# Patient Record
Sex: FEMALE | Race: WHITE | NOT HISPANIC OR LATINO
[De-identification: names, ages, dates, MRNs, and addresses within clinical notes are randomized per-mention and may not be internally consistent; named-entity substitution may affect disease eponyms.]

---

## 2017-11-13 ENCOUNTER — LABORATORY RESULT (OUTPATIENT)
Age: 67
End: 2017-11-13

## 2017-11-13 ENCOUNTER — APPOINTMENT (OUTPATIENT)
Dept: NEPHROLOGY | Facility: CLINIC | Age: 67
End: 2017-11-13
Payer: MEDICARE

## 2017-11-13 VITALS — SYSTOLIC BLOOD PRESSURE: 140 MMHG | DIASTOLIC BLOOD PRESSURE: 88 MMHG

## 2017-11-13 VITALS — WEIGHT: 179 LBS | OXYGEN SATURATION: 98 % | BODY MASS INDEX: 32.94 KG/M2 | HEART RATE: 81 BPM

## 2017-11-13 VITALS — DIASTOLIC BLOOD PRESSURE: 84 MMHG | SYSTOLIC BLOOD PRESSURE: 146 MMHG

## 2017-11-13 VITALS — HEART RATE: 72 BPM | DIASTOLIC BLOOD PRESSURE: 80 MMHG | SYSTOLIC BLOOD PRESSURE: 140 MMHG

## 2017-11-13 DIAGNOSIS — Z23 ENCOUNTER FOR IMMUNIZATION: ICD-10-CM

## 2017-11-13 PROCEDURE — 90662 IIV NO PRSV INCREASED AG IM: CPT

## 2017-11-13 PROCEDURE — 90732 PPSV23 VACC 2 YRS+ SUBQ/IM: CPT

## 2017-11-13 PROCEDURE — G0008: CPT

## 2017-11-13 PROCEDURE — 93000 ELECTROCARDIOGRAM COMPLETE: CPT

## 2017-11-13 PROCEDURE — 99214 OFFICE O/P EST MOD 30 MIN: CPT | Mod: 25

## 2017-11-13 PROCEDURE — 36415 COLL VENOUS BLD VENIPUNCTURE: CPT

## 2017-11-13 PROCEDURE — G0009: CPT

## 2017-11-15 ENCOUNTER — RECORD ABSTRACTING (OUTPATIENT)
Age: 67
End: 2017-11-15

## 2017-11-16 LAB
24R-OH-CALCIDIOL SERPL-MCNC: 35.1 PG/ML
25(OH)D3 SERPL-MCNC: 28.2 NG/ML
ALBUMIN MFR SERPL ELPH: 63.1 %
ALBUMIN SERPL ELPH-MCNC: 4.5 G/DL
ALBUMIN SERPL-MCNC: 4.5 G/DL
ALBUMIN/GLOB SERPL: 1.7 RATIO
ALDOSTERONE SERUM: 10 NG/DL
ALP BLD-CCNC: 81 U/L
ALP BONE SERPL-MCNC: 12 MCG/L
ALPHA1 GLOB MFR SERPL ELPH: 3.9 %
ALPHA1 GLOB SERPL ELPH-MCNC: 0.3 G/DL
ALPHA2 GLOB MFR SERPL ELPH: 9.3 %
ALPHA2 GLOB SERPL ELPH-MCNC: 0.7 G/DL
ALT SERPL-CCNC: 23 U/L
ANA SER IF-ACNC: NEGATIVE
ANION GAP SERPL CALC-SCNC: 16 MMOL/L
APPEARANCE: CLEAR
AST SERPL-CCNC: 22 U/L
B-GLOBULIN MFR SERPL ELPH: 11.3 %
B-GLOBULIN SERPL ELPH-MCNC: 0.8 G/DL
BACTERIA: ABNORMAL
BASOPHILS # BLD AUTO: 0.02 K/UL
BASOPHILS NFR BLD AUTO: 0.2 %
BILIRUB SERPL-MCNC: 0.3 MG/DL
BILIRUBIN URINE: NEGATIVE
BLOOD URINE: NEGATIVE
BUN SERPL-MCNC: 19 MG/DL
C3 SERPL-MCNC: 127 MG/DL
C4 SERPL-MCNC: 40 MG/DL
CALCIUM SERPL-MCNC: 9.9 MG/DL
CALCIUM SERPL-MCNC: 9.9 MG/DL
CHLORIDE SERPL-SCNC: 103 MMOL/L
CHOLEST SERPL-MCNC: 266 MG/DL
CHOLEST/HDLC SERPL: 5.8 RATIO
CO2 SERPL-SCNC: 23 MMOL/L
COLLAGEN CTX SERPL-MCNC: 165 PG/ML
COLOR: YELLOW
CREAT SERPL-MCNC: 0.82 MG/DL
CREAT SPEC-SCNC: 27 MG/DL
CREAT SPEC-SCNC: 27 MG/DL
CREAT/PROT UR: NORMAL
CRP SERPL-MCNC: 0.5 MG/DL
DEPRECATED KAPPA LC FREE/LAMBDA SER: 1.34 RATIO
EOSINOPHIL # BLD AUTO: 0.07 K/UL
EOSINOPHIL NFR BLD AUTO: 0.8 %
ERYTHROCYTE [SEDIMENTATION RATE] IN BLOOD BY WESTERGREN METHOD: 19 MM/HR
FERRITIN SERPL-MCNC: 180 NG/ML
FOLATE SERPL-MCNC: 9 NG/ML
GAMMA GLOB FLD ELPH-MCNC: 0.9 G/DL
GAMMA GLOB MFR SERPL ELPH: 12.4 %
GLUCOSE QUALITATIVE U: NEGATIVE MG/DL
GLUCOSE SERPL-MCNC: 89 MG/DL
HBA1C MFR BLD HPLC: 5.4 %
HBV SURFACE AB SER QL: NONREACTIVE
HBV SURFACE AG SER QL: NONREACTIVE
HCT VFR BLD CALC: 42.4 %
HCV AB SER QL: NONREACTIVE
HCV S/CO RATIO: 0.09 S/CO
HCYS SERPL-MCNC: 8.5 UMOL/L
HDLC SERPL-MCNC: 46 MG/DL
HGB BLD-MCNC: 14.4 G/DL
HYALINE CASTS: 3 /LPF
IGA SER QL IEP: 257 MG/DL
IGG SER QL IEP: 899 MG/DL
IGM SER QL IEP: 159 MG/DL
IMM GRANULOCYTES NFR BLD AUTO: 0.2 %
INTERPRETATION SERPL IEP-IMP: NORMAL
IRON SATN MFR SERPL: 30 %
IRON SERPL-MCNC: 87 UG/DL
KAPPA LC CSF-MCNC: 1.9 MG/DL
KAPPA LC SERPL-MCNC: 2.54 MG/DL
KETONES URINE: NEGATIVE
LDLC SERPL CALC-MCNC: 155 MG/DL
LEUKOCYTE ESTERASE URINE: ABNORMAL
LYMPHOCYTES # BLD AUTO: 2.16 K/UL
LYMPHOCYTES NFR BLD AUTO: 25.4 %
M PROTEIN SPEC IFE-MCNC: NORMAL
MAGNESIUM SERPL-MCNC: 2.1 MG/DL
MAN DIFF?: NORMAL
MCHC RBC-ENTMCNC: 31.1 PG
MCHC RBC-ENTMCNC: 34 GM/DL
MCV RBC AUTO: 91.6 FL
MICROALBUMIN 24H UR DL<=1MG/L-MCNC: 0.4 MG/DL
MICROALBUMIN/CREAT 24H UR-RTO: 15 MG/G
MICROSCOPIC-UA: NORMAL
MONOCYTES # BLD AUTO: 0.54 K/UL
MONOCYTES NFR BLD AUTO: 6.3 %
MPO AB + PR3 PNL SER: NORMAL
NEUTROPHILS # BLD AUTO: 5.71 K/UL
NEUTROPHILS NFR BLD AUTO: 67.1 %
NITRITE URINE: NEGATIVE
PARATHYROID HORMONE INTACT: 45 PG/ML
PH URINE: 7.5
PHOSPHATE SERPL-MCNC: 3.3 MG/DL
PLATELET # BLD AUTO: 245 K/UL
POTASSIUM SERPL-SCNC: 4.6 MMOL/L
PROT SERPL-MCNC: 7.1 G/DL
PROT UR-MCNC: <4 MG/DL
PROTEIN URINE: NEGATIVE MG/DL
RBC # BLD: 4.63 M/UL
RBC # FLD: 13 %
RED BLOOD CELLS URINE: 1 /HPF
RENIN PLASMA: 3 PG/ML
RHEUMATOID FACT SER QL: <7 IU/ML
SODIUM SERPL-SCNC: 142 MMOL/L
SPECIFIC GRAVITY URINE: 1.01
SQUAMOUS EPITHELIAL CELLS: 10 /HPF
T3FREE SERPL-MCNC: 3.18 PG/ML
T3RU NFR SERPL: 1.06 INDEX
T4 FREE SERPL-MCNC: 1.4 NG/DL
T4 SERPL-MCNC: 9.5 UG/DL
THYROGLOB AB SERPL-ACNC: <20 IU/ML
THYROPEROXIDASE AB SERPL IA-ACNC: <10 IU/ML
TIBC SERPL-MCNC: 291 UG/DL
TRIGL SERPL-MCNC: 327 MG/DL
TSH SERPL-ACNC: 1.83 UIU/ML
UIBC SERPL-MCNC: 204 UG/DL
URATE SERPL-MCNC: 4.6 MG/DL
UROBILINOGEN URINE: NEGATIVE MG/DL
VIT B12 SERPL-MCNC: 396 PG/ML
WBC # FLD AUTO: 8.52 K/UL
WHITE BLOOD CELLS URINE: 35 /HPF

## 2017-11-22 LAB — METHYLMALONATE SERPL-SCNC: 177 NMOL/L

## 2018-01-03 ENCOUNTER — APPOINTMENT (OUTPATIENT)
Dept: NEPHROLOGY | Facility: CLINIC | Age: 68
End: 2018-01-03

## 2018-01-18 ENCOUNTER — LABORATORY RESULT (OUTPATIENT)
Age: 68
End: 2018-01-18

## 2018-01-18 ENCOUNTER — APPOINTMENT (OUTPATIENT)
Dept: NEPHROLOGY | Facility: CLINIC | Age: 68
End: 2018-01-18
Payer: MEDICARE

## 2018-01-18 VITALS — OXYGEN SATURATION: 96 % | BODY MASS INDEX: 32.57 KG/M2 | HEART RATE: 94 BPM | WEIGHT: 177 LBS

## 2018-01-18 VITALS — SYSTOLIC BLOOD PRESSURE: 120 MMHG | DIASTOLIC BLOOD PRESSURE: 82 MMHG

## 2018-01-18 VITALS — SYSTOLIC BLOOD PRESSURE: 130 MMHG | DIASTOLIC BLOOD PRESSURE: 86 MMHG | HEART RATE: 72 BPM

## 2018-01-18 PROCEDURE — 36415 COLL VENOUS BLD VENIPUNCTURE: CPT

## 2018-01-18 PROCEDURE — 99215 OFFICE O/P EST HI 40 MIN: CPT | Mod: 25

## 2018-01-22 LAB
24R-OH-CALCIDIOL SERPL-MCNC: 60.7 PG/ML
25(OH)D3 SERPL-MCNC: 29.7 NG/ML
ALBUMIN MFR SERPL ELPH: 62.9 %
ALBUMIN SERPL ELPH-MCNC: 4.5 G/DL
ALBUMIN SERPL-MCNC: 4.8 G/DL
ALBUMIN/GLOB SERPL: 1.7 RATIO
ALDOSTERONE SERUM: 7.7 NG/DL
ALP BLD-CCNC: 66 U/L
ALP BONE SERPL-MCNC: 14 MCG/L
ALPHA1 GLOB MFR SERPL ELPH: 3.6 %
ALPHA1 GLOB SERPL ELPH-MCNC: 0.3 G/DL
ALPHA2 GLOB MFR SERPL ELPH: 9.6 %
ALPHA2 GLOB SERPL ELPH-MCNC: 0.7 G/DL
ALT SERPL-CCNC: 20 U/L
ANA SER IF-ACNC: NEGATIVE
ANION GAP SERPL CALC-SCNC: 13 MMOL/L
AST SERPL-CCNC: 24 U/L
B-GLOBULIN MFR SERPL ELPH: 11.3 %
B-GLOBULIN SERPL ELPH-MCNC: 0.9 G/DL
BASOPHILS # BLD AUTO: 0.02 K/UL
BASOPHILS NFR BLD AUTO: 0.2 %
BILIRUB SERPL-MCNC: 0.4 MG/DL
BUN SERPL-MCNC: 20 MG/DL
C3 SERPL-MCNC: 138 MG/DL
C4 SERPL-MCNC: 47 MG/DL
CALCIUM SERPL-MCNC: 9.8 MG/DL
CALCIUM SERPL-MCNC: 9.8 MG/DL
CHLORIDE SERPL-SCNC: 104 MMOL/L
CHOLEST SERPL-MCNC: 164 MG/DL
CHOLEST/HDLC SERPL: 3.2 RATIO
CO2 SERPL-SCNC: 27 MMOL/L
COLLAGEN CTX SERPL-MCNC: 126 PG/ML
CREAT SERPL-MCNC: 0.73 MG/DL
CRP SERPL-MCNC: 0.5 MG/DL
DEPRECATED KAPPA LC FREE/LAMBDA SER: 1.32 RATIO
EOSINOPHIL # BLD AUTO: 0.08 K/UL
EOSINOPHIL NFR BLD AUTO: 1 %
ERYTHROCYTE [SEDIMENTATION RATE] IN BLOOD BY WESTERGREN METHOD: 13 MM/HR
FERRITIN SERPL-MCNC: 166 NG/ML
FOLATE SERPL-MCNC: >20 NG/ML
GAMMA GLOB FLD ELPH-MCNC: 1 G/DL
GAMMA GLOB MFR SERPL ELPH: 12.6 %
GLUCOSE SERPL-MCNC: 101 MG/DL
HBA1C MFR BLD HPLC: 5.4 %
HBV SURFACE AB SER QL: NONREACTIVE
HBV SURFACE AG SER QL: NONREACTIVE
HCT VFR BLD CALC: 43.6 %
HCV AB SER QL: NONREACTIVE
HCV S/CO RATIO: 0.08 S/CO
HCYS SERPL-MCNC: 8.9 UMOL/L
HDLC SERPL-MCNC: 51 MG/DL
HGB BLD-MCNC: 14.2 G/DL
IGA SER QL IEP: 216 MG/DL
IGG SER QL IEP: 946 MG/DL
IGM SER QL IEP: 144 MG/DL
IMM GRANULOCYTES NFR BLD AUTO: 0.1 %
INTERPRETATION SERPL IEP-IMP: NORMAL
IRON SATN MFR SERPL: 21 %
IRON SERPL-MCNC: 64 UG/DL
KAPPA LC CSF-MCNC: 1.87 MG/DL
KAPPA LC SERPL-MCNC: 2.47 MG/DL
LDLC SERPL CALC-MCNC: 93 MG/DL
LYMPHOCYTES # BLD AUTO: 2.43 K/UL
LYMPHOCYTES NFR BLD AUTO: 29.5 %
M PROTEIN SPEC IFE-MCNC: NORMAL
MAGNESIUM SERPL-MCNC: 2.2 MG/DL
MAN DIFF?: NORMAL
MCHC RBC-ENTMCNC: 30.5 PG
MCHC RBC-ENTMCNC: 32.6 GM/DL
MCV RBC AUTO: 93.8 FL
MONOCYTES # BLD AUTO: 0.67 K/UL
MONOCYTES NFR BLD AUTO: 8.1 %
MPO AB + PR3 PNL SER: NORMAL
NEUTROPHILS # BLD AUTO: 5.04 K/UL
NEUTROPHILS NFR BLD AUTO: 61.1 %
PARATHYROID HORMONE INTACT: 40 PG/ML
PHOSPHATE SERPL-MCNC: 2.8 MG/DL
PLATELET # BLD AUTO: 214 K/UL
POTASSIUM SERPL-SCNC: 4.6 MMOL/L
PROT SERPL-MCNC: 7.7 G/DL
RBC # BLD: 4.65 M/UL
RBC # FLD: 13.3 %
RENIN PLASMA: 4.3 PG/ML
RHEUMATOID FACT SER QL: <7 IU/ML
SODIUM SERPL-SCNC: 144 MMOL/L
T3FREE SERPL-MCNC: 2.99 PG/ML
T3RU NFR SERPL: 1.05 INDEX
T4 FREE SERPL-MCNC: 1.3 NG/DL
T4 SERPL-MCNC: 9 UG/DL
THYROGLOB AB SERPL-ACNC: <20 IU/ML
THYROPEROXIDASE AB SERPL IA-ACNC: <10 IU/ML
TIBC SERPL-MCNC: 298 UG/DL
TRIGL SERPL-MCNC: 102 MG/DL
TSH SERPL-ACNC: 1.13 UIU/ML
UIBC SERPL-MCNC: 234 UG/DL
URATE SERPL-MCNC: 4.4 MG/DL
VIT B12 SERPL-MCNC: 474 PG/ML
WBC # FLD AUTO: 8.25 K/UL

## 2018-01-23 LAB — METHYLMALONATE SERPL-SCNC: 145 NMOL/L

## 2018-04-03 ENCOUNTER — RX RENEWAL (OUTPATIENT)
Age: 68
End: 2018-04-03

## 2018-04-04 ENCOUNTER — APPOINTMENT (OUTPATIENT)
Dept: NEPHROLOGY | Facility: CLINIC | Age: 68
End: 2018-04-04
Payer: MEDICARE

## 2018-04-04 ENCOUNTER — LABORATORY RESULT (OUTPATIENT)
Age: 68
End: 2018-04-04

## 2018-04-04 VITALS — DIASTOLIC BLOOD PRESSURE: 84 MMHG | SYSTOLIC BLOOD PRESSURE: 124 MMHG | HEART RATE: 84 BPM

## 2018-04-04 VITALS — WEIGHT: 179 LBS | HEART RATE: 86 BPM | OXYGEN SATURATION: 96 % | BODY MASS INDEX: 32.94 KG/M2

## 2018-04-04 VITALS — DIASTOLIC BLOOD PRESSURE: 80 MMHG | SYSTOLIC BLOOD PRESSURE: 130 MMHG

## 2018-04-04 PROCEDURE — 36415 COLL VENOUS BLD VENIPUNCTURE: CPT

## 2018-04-04 PROCEDURE — 99214 OFFICE O/P EST MOD 30 MIN: CPT | Mod: 25

## 2018-04-08 LAB
24R-OH-CALCIDIOL SERPL-MCNC: 56.3 PG/ML
25(OH)D3 SERPL-MCNC: 24.3 NG/ML
ALBUMIN MFR SERPL ELPH: 61.4 %
ALBUMIN SERPL ELPH-MCNC: 4.4 G/DL
ALBUMIN SERPL-MCNC: 4.7 G/DL
ALBUMIN/GLOB SERPL: 1.6 RATIO
ALDOSTERONE SERUM: 7.3 NG/DL
ALP BLD-CCNC: 73 U/L
ALP BONE SERPL-MCNC: 11 MCG/L
ALPHA1 GLOB MFR SERPL ELPH: 4 %
ALPHA1 GLOB SERPL ELPH-MCNC: 0.3 G/DL
ALPHA2 GLOB MFR SERPL ELPH: 9.2 %
ALPHA2 GLOB SERPL ELPH-MCNC: 0.7 G/DL
ALT SERPL-CCNC: 19 U/L
ANION GAP SERPL CALC-SCNC: 11 MMOL/L
APPEARANCE: CLEAR
AST SERPL-CCNC: 23 U/L
B-GLOBULIN MFR SERPL ELPH: 11.9 %
B-GLOBULIN SERPL ELPH-MCNC: 0.9 G/DL
BACTERIA: ABNORMAL
BASOPHILS # BLD AUTO: 0.02 K/UL
BASOPHILS NFR BLD AUTO: 0.3 %
BILIRUB SERPL-MCNC: 0.4 MG/DL
BILIRUBIN URINE: NEGATIVE
BLOOD URINE: NEGATIVE
BUN SERPL-MCNC: 20 MG/DL
CALCIUM SERPL-MCNC: 10.3 MG/DL
CALCIUM SERPL-MCNC: 10.3 MG/DL
CHLORIDE SERPL-SCNC: 104 MMOL/L
CHOLEST SERPL-MCNC: 180 MG/DL
CHOLEST/HDLC SERPL: 3.5 RATIO
CO2 SERPL-SCNC: 27 MMOL/L
COLLAGEN CTX SERPL-MCNC: 220 PG/ML
COLOR: YELLOW
CREAT SERPL-MCNC: 0.77 MG/DL
CRP SERPL-MCNC: 0.3 MG/DL
DEPRECATED KAPPA LC FREE/LAMBDA SER: 1 RATIO
EOSINOPHIL # BLD AUTO: 0.05 K/UL
EOSINOPHIL NFR BLD AUTO: 0.6 %
ERYTHROCYTE [SEDIMENTATION RATE] IN BLOOD BY WESTERGREN METHOD: 12 MM/HR
FERRITIN SERPL-MCNC: 143 NG/ML
FOLATE SERPL-MCNC: 13.5 NG/ML
GAMMA GLOB FLD ELPH-MCNC: 1 G/DL
GAMMA GLOB MFR SERPL ELPH: 13.5 %
GLUCOSE QUALITATIVE U: NEGATIVE MG/DL
GLUCOSE SERPL-MCNC: 91 MG/DL
HBA1C MFR BLD HPLC: 5.6 %
HCT VFR BLD CALC: 44.9 %
HCYS SERPL-MCNC: 9.6 UMOL/L
HDLC SERPL-MCNC: 52 MG/DL
HGB BLD-MCNC: 14.5 G/DL
HYALINE CASTS: 1 /LPF
IGA SER QL IEP: 230 MG/DL
IGG SER QL IEP: 899 MG/DL
IGM SER QL IEP: 136 MG/DL
IMM GRANULOCYTES NFR BLD AUTO: 0.1 %
INTERPRETATION SERPL IEP-IMP: NORMAL
IRON SATN MFR SERPL: 18 %
IRON SERPL-MCNC: 51 UG/DL
KAPPA LC CSF-MCNC: 2.23 MG/DL
KAPPA LC SERPL-MCNC: 2.23 MG/DL
KETONES URINE: NEGATIVE
LDLC SERPL CALC-MCNC: 102 MG/DL
LEUKOCYTE ESTERASE URINE: ABNORMAL
LYMPHOCYTES # BLD AUTO: 2.19 K/UL
LYMPHOCYTES NFR BLD AUTO: 27.6 %
M PROTEIN SPEC IFE-MCNC: NORMAL
MAGNESIUM SERPL-MCNC: 2.1 MG/DL
MAN DIFF?: NORMAL
MCHC RBC-ENTMCNC: 30.7 PG
MCHC RBC-ENTMCNC: 32.3 GM/DL
MCV RBC AUTO: 95.1 FL
MICROSCOPIC-UA: NORMAL
MONOCYTES # BLD AUTO: 0.54 K/UL
MONOCYTES NFR BLD AUTO: 6.8 %
NEUTROPHILS # BLD AUTO: 5.12 K/UL
NEUTROPHILS NFR BLD AUTO: 64.6 %
NITRITE URINE: NEGATIVE
PARATHYROID HORMONE INTACT: 43 PG/ML
PH URINE: 7
PHOSPHATE SERPL-MCNC: 3.9 MG/DL
PLATELET # BLD AUTO: 220 K/UL
POTASSIUM SERPL-SCNC: 4.5 MMOL/L
PROT SERPL-MCNC: 7.6 G/DL
PROTEIN URINE: NEGATIVE MG/DL
RBC # BLD: 4.72 M/UL
RBC # FLD: 13.3 %
RED BLOOD CELLS URINE: 3 /HPF
RENIN PLASMA: 3.7 PG/ML
SODIUM SERPL-SCNC: 142 MMOL/L
SPECIFIC GRAVITY URINE: 1.01
SQUAMOUS EPITHELIAL CELLS: 7 /HPF
T3FREE SERPL-MCNC: 3.46 PG/ML
T3RU NFR SERPL: 1.1 INDEX
T4 FREE SERPL-MCNC: 1.4 NG/DL
T4 SERPL-MCNC: 9 UG/DL
THYROGLOB AB SERPL-ACNC: <20 IU/ML
THYROPEROXIDASE AB SERPL IA-ACNC: <10 IU/ML
TIBC SERPL-MCNC: 291 UG/DL
TRIGL SERPL-MCNC: 130 MG/DL
TSH SERPL-ACNC: 1.58 UIU/ML
UIBC SERPL-MCNC: 240 UG/DL
URATE SERPL-MCNC: 4.3 MG/DL
UROBILINOGEN URINE: NEGATIVE MG/DL
VIT B12 SERPL-MCNC: 485 PG/ML
WBC # FLD AUTO: 7.93 K/UL
WHITE BLOOD CELLS URINE: 34 /HPF

## 2018-04-12 LAB — METHYLMALONATE SERPL-SCNC: 169 NMOL/L

## 2018-07-18 ENCOUNTER — APPOINTMENT (OUTPATIENT)
Dept: NEPHROLOGY | Facility: CLINIC | Age: 68
End: 2018-07-18
Payer: MEDICARE

## 2018-07-18 ENCOUNTER — LABORATORY RESULT (OUTPATIENT)
Age: 68
End: 2018-07-18

## 2018-07-18 VITALS — WEIGHT: 179 LBS | BODY MASS INDEX: 32.94 KG/M2

## 2018-07-18 VITALS — DIASTOLIC BLOOD PRESSURE: 70 MMHG | SYSTOLIC BLOOD PRESSURE: 110 MMHG

## 2018-07-18 VITALS — HEART RATE: 72 BPM | DIASTOLIC BLOOD PRESSURE: 70 MMHG | SYSTOLIC BLOOD PRESSURE: 110 MMHG

## 2018-07-18 PROCEDURE — 36415 COLL VENOUS BLD VENIPUNCTURE: CPT

## 2018-07-18 PROCEDURE — 99214 OFFICE O/P EST MOD 30 MIN: CPT | Mod: 25

## 2018-07-19 LAB
APPEARANCE: ABNORMAL
BACTERIA: ABNORMAL
BILIRUBIN URINE: NEGATIVE
BLOOD URINE: ABNORMAL
COLOR: YELLOW
GLUCOSE QUALITATIVE U: NEGATIVE MG/DL
HYALINE CASTS: 8 /LPF
KETONES URINE: NEGATIVE
LEUKOCYTE ESTERASE URINE: ABNORMAL
MICROSCOPIC-UA: NORMAL
NITRITE URINE: NEGATIVE
PH URINE: 8.5
PROTEIN URINE: ABNORMAL MG/DL
RED BLOOD CELLS URINE: 24 /HPF
SPECIFIC GRAVITY URINE: 1.02
SQUAMOUS EPITHELIAL CELLS: 8 /HPF
UROBILINOGEN URINE: NEGATIVE MG/DL
WHITE BLOOD CELLS URINE: 149 /HPF

## 2018-07-20 LAB
24R-OH-CALCIDIOL SERPL-MCNC: 41.7 PG/ML
25(OH)D3 SERPL-MCNC: 28.2 NG/ML
ALBUMIN MFR SERPL ELPH: 61 %
ALBUMIN SERPL ELPH-MCNC: 4.6 G/DL
ALBUMIN SERPL-MCNC: 4.3 G/DL
ALBUMIN/GLOB SERPL: 1.5 RATIO
ALDOSTERONE SERUM: 7.2 NG/DL
ALP BLD-CCNC: 65 U/L
ALP BONE SERPL-MCNC: 11 MCG/L
ALPHA1 GLOB MFR SERPL ELPH: 4.1 %
ALPHA1 GLOB SERPL ELPH-MCNC: 0.3 G/DL
ALPHA2 GLOB MFR SERPL ELPH: 9.9 %
ALPHA2 GLOB SERPL ELPH-MCNC: 0.7 G/DL
ALT SERPL-CCNC: 19 U/L
ANION GAP SERPL CALC-SCNC: 14 MMOL/L
AST SERPL-CCNC: 21 U/L
B-GLOBULIN MFR SERPL ELPH: 12.1 %
B-GLOBULIN SERPL ELPH-MCNC: 0.9 G/DL
BASOPHILS # BLD AUTO: 0.01 K/UL
BASOPHILS NFR BLD AUTO: 0.1 %
BILIRUB SERPL-MCNC: 0.5 MG/DL
BUN SERPL-MCNC: 16 MG/DL
C3 SERPL-MCNC: 141 MG/DL
C4 SERPL-MCNC: 47 MG/DL
CALCIUM SERPL-MCNC: 9.8 MG/DL
CALCIUM SERPL-MCNC: 9.8 MG/DL
CHLORIDE SERPL-SCNC: 102 MMOL/L
CHOLEST SERPL-MCNC: 130 MG/DL
CHOLEST/HDLC SERPL: 3.1 RATIO
CO2 SERPL-SCNC: 26 MMOL/L
COLLAGEN CTX SERPL-MCNC: 132 PG/ML
CREAT SERPL-MCNC: 0.69 MG/DL
CREAT SPEC-SCNC: 50 MG/DL
CREAT/PROT UR: 0.3 RATIO
CRP SERPL-MCNC: 0.57 MG/DL
DEPRECATED KAPPA LC FREE/LAMBDA SER: 0.98 RATIO
EOSINOPHIL # BLD AUTO: 0.08 K/UL
EOSINOPHIL NFR BLD AUTO: 1 %
ERYTHROCYTE [SEDIMENTATION RATE] IN BLOOD BY WESTERGREN METHOD: 18 MM/HR
FERRITIN SERPL-MCNC: 170 NG/ML
FOLATE SERPL-MCNC: 18.8 NG/ML
GAMMA GLOB FLD ELPH-MCNC: 0.9 G/DL
GAMMA GLOB MFR SERPL ELPH: 12.9 %
GLUCOSE SERPL-MCNC: 116 MG/DL
HBA1C MFR BLD HPLC: 5.6 %
HBV SURFACE AB SER QL: NONREACTIVE
HBV SURFACE AG SER QL: NONREACTIVE
HCT VFR BLD CALC: 41.7 %
HCV AB SER QL: NONREACTIVE
HCV S/CO RATIO: 0.08 S/CO
HCYS SERPL-MCNC: 7.8 UMOL/L
HDLC SERPL-MCNC: 42 MG/DL
HGB BLD-MCNC: 13.4 G/DL
IGA SER QL IEP: 191 MG/DL
IGG SER QL IEP: 763 MG/DL
IGM SER QL IEP: 117 MG/DL
IMM GRANULOCYTES NFR BLD AUTO: 0.1 %
INTERPRETATION SERPL IEP-IMP: NORMAL
IRON SATN MFR SERPL: 21 %
IRON SERPL-MCNC: 66 UG/DL
KAPPA LC CSF-MCNC: 2.11 MG/DL
KAPPA LC SERPL-MCNC: 2.07 MG/DL
LDLC SERPL CALC-MCNC: 66 MG/DL
LYMPHOCYTES # BLD AUTO: 2.5 K/UL
LYMPHOCYTES NFR BLD AUTO: 31.4 %
M PROTEIN SPEC IFE-MCNC: NORMAL
MAGNESIUM SERPL-MCNC: 2 MG/DL
MAN DIFF?: NORMAL
MCHC RBC-ENTMCNC: 29.5 PG
MCHC RBC-ENTMCNC: 32.1 GM/DL
MCV RBC AUTO: 91.9 FL
MONOCYTES # BLD AUTO: 0.54 K/UL
MONOCYTES NFR BLD AUTO: 6.8 %
MPO AB + PR3 PNL SER: NORMAL
NEUTROPHILS # BLD AUTO: 4.83 K/UL
NEUTROPHILS NFR BLD AUTO: 60.6 %
PARATHYROID HORMONE INTACT: 36 PG/ML
PHOSPHATE SERPL-MCNC: 3.8 MG/DL
PLATELET # BLD AUTO: 232 K/UL
POTASSIUM SERPL-SCNC: 4.5 MMOL/L
PROT SERPL-MCNC: 7.1 G/DL
PROT UR-MCNC: 13 MG/DL
RBC # BLD: 4.54 M/UL
RBC # FLD: 13.4 %
RENIN PLASMA: 2.7 PG/ML
RHEUMATOID FACT SER QL: <10 IU/ML
SODIUM SERPL-SCNC: 142 MMOL/L
T3FREE SERPL-MCNC: 3.06 PG/ML
T3RU NFR SERPL: 1.04 INDEX
T4 FREE SERPL-MCNC: 1.3 NG/DL
T4 SERPL-MCNC: 8.1 UG/DL
THYROGLOB AB SERPL-ACNC: <20 IU/ML
THYROPEROXIDASE AB SERPL IA-ACNC: <10 IU/ML
TIBC SERPL-MCNC: 311 UG/DL
TRIGL SERPL-MCNC: 108 MG/DL
TSH SERPL-ACNC: 1.64 UIU/ML
UIBC SERPL-MCNC: 245 UG/DL
URATE SERPL-MCNC: 4.4 MG/DL
VIT B12 SERPL-MCNC: 598 PG/ML
WBC # FLD AUTO: 7.97 K/UL

## 2018-07-24 LAB
ANA SER IF-ACNC: NEGATIVE
CREAT 24H UR-MCNC: NORMAL
CREAT ?TM UR-MCNC: 54 MG/DL
PROT 24H UR-MRATE: 12 MG/DL
PROT ?TM UR-MCNC: NORMAL
PROT UR-MCNC: NORMAL
SPECIMEN VOL 24H UR: NORMAL

## 2018-07-27 LAB — METHYLMALONATE SERPL-SCNC: 170 NMOL/L

## 2018-10-03 ENCOUNTER — APPOINTMENT (OUTPATIENT)
Dept: NEPHROLOGY | Facility: CLINIC | Age: 68
End: 2018-10-03
Payer: MEDICARE

## 2018-10-03 ENCOUNTER — LABORATORY RESULT (OUTPATIENT)
Age: 68
End: 2018-10-03

## 2018-10-03 VITALS — SYSTOLIC BLOOD PRESSURE: 124 MMHG | DIASTOLIC BLOOD PRESSURE: 80 MMHG

## 2018-10-03 VITALS — HEART RATE: 76 BPM | BODY MASS INDEX: 33.31 KG/M2 | OXYGEN SATURATION: 98 % | WEIGHT: 181 LBS

## 2018-10-03 VITALS — DIASTOLIC BLOOD PRESSURE: 82 MMHG | SYSTOLIC BLOOD PRESSURE: 134 MMHG

## 2018-10-03 VITALS — SYSTOLIC BLOOD PRESSURE: 136 MMHG | HEART RATE: 60 BPM | DIASTOLIC BLOOD PRESSURE: 82 MMHG

## 2018-10-03 PROCEDURE — 36415 COLL VENOUS BLD VENIPUNCTURE: CPT

## 2018-10-03 PROCEDURE — 99214 OFFICE O/P EST MOD 30 MIN: CPT | Mod: 25

## 2018-10-03 PROCEDURE — 90662 IIV NO PRSV INCREASED AG IM: CPT

## 2018-10-03 PROCEDURE — G0008: CPT

## 2018-10-05 LAB
24R-OH-CALCIDIOL SERPL-MCNC: 63.5 PG/ML
25(OH)D3 SERPL-MCNC: 26.5 NG/ML
ALBUMIN MFR SERPL ELPH: 62.7 %
ALBUMIN SERPL ELPH-MCNC: 4.7 G/DL
ALBUMIN SERPL-MCNC: 4.6 G/DL
ALBUMIN/GLOB SERPL: 1.7 RATIO
ALDOSTERONE SERUM: 6.6 NG/DL
ALP BLD-CCNC: 72 U/L
ALP BONE SERPL-MCNC: 12 MCG/L
ALPHA1 GLOB MFR SERPL ELPH: 3.8 %
ALPHA1 GLOB SERPL ELPH-MCNC: 0.3 G/DL
ALPHA2 GLOB MFR SERPL ELPH: 9.1 %
ALPHA2 GLOB SERPL ELPH-MCNC: 0.7 G/DL
ALT SERPL-CCNC: 18 U/L
ANION GAP SERPL CALC-SCNC: 13 MMOL/L
APPEARANCE: CLEAR
AST SERPL-CCNC: 24 U/L
B-GLOBULIN MFR SERPL ELPH: 11.6 %
B-GLOBULIN SERPL ELPH-MCNC: 0.8 G/DL
BACTERIA: NEGATIVE
BASOPHILS # BLD AUTO: 0.02 K/UL
BASOPHILS NFR BLD AUTO: 0.3 %
BILIRUB SERPL-MCNC: 0.4 MG/DL
BILIRUBIN URINE: NEGATIVE
BLOOD URINE: NEGATIVE
BUN SERPL-MCNC: 14 MG/DL
C PEPTIDE SERPL-MCNC: 3.3 NG/ML
C3 SERPL-MCNC: 128 MG/DL
C4 SERPL-MCNC: 44 MG/DL
CALCIUM SERPL-MCNC: 10 MG/DL
CALCIUM SERPL-MCNC: 10 MG/DL
CHLORIDE SERPL-SCNC: 104 MMOL/L
CHOLEST SERPL-MCNC: 183 MG/DL
CHOLEST/HDLC SERPL: 4.2 RATIO
CO2 SERPL-SCNC: 25 MMOL/L
COLOR: YELLOW
CORTIS SERPL-MCNC: 7.5 UG/DL
CREAT SERPL-MCNC: 0.6 MG/DL
CREAT SPEC-SCNC: 26 MG/DL
CREAT SPEC-SCNC: 26 MG/DL
CREAT/PROT UR: 0.2 RATIO
CRP SERPL-MCNC: 0.35 MG/DL
DEPRECATED KAPPA LC FREE/LAMBDA SER: 1.04 RATIO
EOSINOPHIL # BLD AUTO: 0.08 K/UL
EOSINOPHIL NFR BLD AUTO: 1 %
ERYTHROCYTE [SEDIMENTATION RATE] IN BLOOD BY WESTERGREN METHOD: 13 MM/HR
FERRITIN SERPL-MCNC: 144 NG/ML
FOLATE SERPL-MCNC: 14.7 NG/ML
GAMMA GLOB FLD ELPH-MCNC: 0.9 G/DL
GAMMA GLOB MFR SERPL ELPH: 12.8 %
GLUCOSE QUALITATIVE U: NEGATIVE MG/DL
GLUCOSE SERPL-MCNC: 87 MG/DL
HBA1C MFR BLD HPLC: 5.8 %
HBV SURFACE AB SER QL: NONREACTIVE
HBV SURFACE AG SER QL: NONREACTIVE
HCT VFR BLD CALC: 43.4 %
HCV AB SER QL: NONREACTIVE
HCV S/CO RATIO: 0.08 S/CO
HCYS SERPL-MCNC: 7.4 UMOL/L
HDLC SERPL-MCNC: 44 MG/DL
HGB BLD-MCNC: 13.9 G/DL
HYALINE CASTS: 0 /LPF
IGA SER QL IEP: 194 MG/DL
IGG SER QL IEP: 827 MG/DL
IGM SER QL IEP: 120 MG/DL
IMM GRANULOCYTES NFR BLD AUTO: 0.3 %
INTERPRETATION SERPL IEP-IMP: NORMAL
IRON SATN MFR SERPL: 26 %
IRON SERPL-MCNC: 81 UG/DL
KAPPA LC CSF-MCNC: 1.88 MG/DL
KAPPA LC SERPL-MCNC: 1.96 MG/DL
KETONES URINE: NEGATIVE
LDLC SERPL CALC-MCNC: 89 MG/DL
LEUKOCYTE ESTERASE URINE: ABNORMAL
LYMPHOCYTES # BLD AUTO: 2.39 K/UL
LYMPHOCYTES NFR BLD AUTO: 30.5 %
M PROTEIN SPEC IFE-MCNC: NORMAL
MAGNESIUM SERPL-MCNC: 2 MG/DL
MAN DIFF?: NORMAL
MCHC RBC-ENTMCNC: 30.5 PG
MCHC RBC-ENTMCNC: 32 GM/DL
MCV RBC AUTO: 95.2 FL
MICROALBUMIN 24H UR DL<=1MG/L-MCNC: <1.2 MG/DL
MICROALBUMIN/CREAT 24H UR-RTO: NORMAL
MICROSCOPIC-UA: NORMAL
MONOCYTES # BLD AUTO: 0.43 K/UL
MONOCYTES NFR BLD AUTO: 5.5 %
MPO AB + PR3 PNL SER: NORMAL
NEUTROPHILS # BLD AUTO: 4.9 K/UL
NEUTROPHILS NFR BLD AUTO: 62.4 %
NITRITE URINE: NEGATIVE
PARATHYROID HORMONE INTACT: 40 PG/ML
PH URINE: 7.5
PHOSPHATE SERPL-MCNC: 3.6 MG/DL
PLATELET # BLD AUTO: 265 K/UL
POTASSIUM SERPL-SCNC: 4.4 MMOL/L
PROT SERPL-MCNC: 7.3 G/DL
PROT UR-MCNC: 4 MG/DL
PROTEIN URINE: NEGATIVE MG/DL
RBC # BLD: 4.56 M/UL
RBC # FLD: 13.7 %
RED BLOOD CELLS URINE: 0 /HPF
RENIN PLASMA: 2.7 PG/ML
RHEUMATOID FACT SER QL: <10 IU/ML
SODIUM SERPL-SCNC: 142 MMOL/L
SPECIFIC GRAVITY URINE: 1.01
SQUAMOUS EPITHELIAL CELLS: 4 /HPF
T3FREE SERPL-MCNC: 3.11 PG/ML
T3RU NFR SERPL: 1.08 INDEX
T4 FREE SERPL-MCNC: 1.3 NG/DL
T4 SERPL-MCNC: 8.6 UG/DL
TIBC SERPL-MCNC: 310 UG/DL
TRIGL SERPL-MCNC: 250 MG/DL
TSH SERPL-ACNC: 1.73 UIU/ML
UIBC SERPL-MCNC: 229 UG/DL
URATE SERPL-MCNC: 3.6 MG/DL
UROBILINOGEN URINE: NEGATIVE MG/DL
VIT B12 SERPL-MCNC: 509 PG/ML
WBC # FLD AUTO: 7.84 K/UL
WHITE BLOOD CELLS URINE: 2 /HPF

## 2018-10-06 LAB
COLLAGEN CTX SERPL-MCNC: 117 PG/ML
THYROGLOB AB SERPL-ACNC: <20 IU/ML
THYROPEROXIDASE AB SERPL IA-ACNC: <10 IU/ML

## 2018-10-13 LAB
ANA SER IF-ACNC: NEGATIVE
METHYLMALONATE SERPL-SCNC: 128 NMOL/L

## 2018-11-14 ENCOUNTER — APPOINTMENT (OUTPATIENT)
Dept: NEPHROLOGY | Facility: CLINIC | Age: 68
End: 2018-11-14
Payer: MEDICARE

## 2018-11-14 VITALS — WEIGHT: 180 LBS | BODY MASS INDEX: 33.12 KG/M2

## 2018-11-14 VITALS — SYSTOLIC BLOOD PRESSURE: 140 MMHG | DIASTOLIC BLOOD PRESSURE: 80 MMHG

## 2018-11-14 VITALS — SYSTOLIC BLOOD PRESSURE: 128 MMHG | DIASTOLIC BLOOD PRESSURE: 84 MMHG | HEART RATE: 72 BPM

## 2018-11-14 PROCEDURE — 99215 OFFICE O/P EST HI 40 MIN: CPT

## 2018-12-09 ENCOUNTER — FORM ENCOUNTER (OUTPATIENT)
Age: 68
End: 2018-12-09

## 2018-12-10 ENCOUNTER — OUTPATIENT (OUTPATIENT)
Dept: OUTPATIENT SERVICES | Facility: HOSPITAL | Age: 68
LOS: 1 days | End: 2018-12-10
Payer: MEDICARE

## 2018-12-10 DIAGNOSIS — R00.1 BRADYCARDIA, UNSPECIFIED: ICD-10-CM

## 2018-12-10 DIAGNOSIS — R42 DIZZINESS AND GIDDINESS: ICD-10-CM

## 2018-12-10 DIAGNOSIS — R00.2 PALPITATIONS: ICD-10-CM

## 2018-12-10 PROCEDURE — 93227 XTRNL ECG REC<48 HR R&I: CPT

## 2018-12-10 PROCEDURE — 93306 TTE W/DOPPLER COMPLETE: CPT | Mod: 26

## 2018-12-11 PROCEDURE — 93306 TTE W/DOPPLER COMPLETE: CPT

## 2018-12-11 PROCEDURE — 93225 XTRNL ECG REC<48 HRS REC: CPT

## 2019-04-10 ENCOUNTER — APPOINTMENT (OUTPATIENT)
Dept: NEPHROLOGY | Facility: CLINIC | Age: 69
End: 2019-04-10
Payer: MEDICARE

## 2019-04-10 ENCOUNTER — TRANSCRIPTION ENCOUNTER (OUTPATIENT)
Age: 69
End: 2019-04-10

## 2019-04-10 ENCOUNTER — LABORATORY RESULT (OUTPATIENT)
Age: 69
End: 2019-04-10

## 2019-04-10 VITALS — DIASTOLIC BLOOD PRESSURE: 80 MMHG | SYSTOLIC BLOOD PRESSURE: 140 MMHG

## 2019-04-10 VITALS — DIASTOLIC BLOOD PRESSURE: 80 MMHG | HEART RATE: 72 BPM | SYSTOLIC BLOOD PRESSURE: 130 MMHG

## 2019-04-10 VITALS — WEIGHT: 174 LBS | BODY MASS INDEX: 32.02 KG/M2 | HEART RATE: 68 BPM | OXYGEN SATURATION: 97 %

## 2019-04-10 PROCEDURE — 36415 COLL VENOUS BLD VENIPUNCTURE: CPT

## 2019-04-10 PROCEDURE — 99214 OFFICE O/P EST MOD 30 MIN: CPT | Mod: 25

## 2019-04-10 NOTE — END OF VISIT
[FreeTextEntry3] : All medical record entries made by the Scribe were at my, Dr. Jose Ramon Ramires, direction and personally dictated by me on 04/10/2019. I have reviewed the chart and agree that the record accurately reflects my personal performance of the history, physical exam, assessment and plan. I have also personally directed, reviewed, and agreed with the chart.

## 2019-04-10 NOTE — PHYSICAL EXAM
[General Appearance - In No Acute Distress] : in no acute distress [General Appearance - Alert] : alert [Extraocular Movements] : extraocular movements were intact [PERRL With Normal Accommodation] : pupils were equal in size, round, and reactive to light [Sclera] : the sclera and conjunctiva were normal [Outer Ear] : the ears and nose were normal in appearance [Oropharynx] : the oropharynx was normal [Neck Cervical Mass (___cm)] : no neck mass was observed [Neck Appearance] : the appearance of the neck was normal [Thyroid Nodule] : there were no palpable thyroid nodules [Jugular Venous Distention Increased] : there was no jugular-venous distention [Thyroid Diffuse Enlargement] : the thyroid was not enlarged [Auscultation Breath Sounds / Voice Sounds] : lungs were clear to auscultation bilaterally [Heart Rate And Rhythm] : heart rate was normal and rhythm regular [Heart Sounds] : normal S1 and S2 [Heart Sounds Pericardial Friction Rub] : no pericardial rub [Murmurs] : no murmurs [Heart Sounds Gallop] : no gallops [Edema] : there was no peripheral edema [Bowel Sounds] : normal bowel sounds [Abdomen Tenderness] : non-tender [Abdomen Soft] : soft [Abdomen Mass (___ Cm)] : no abdominal mass palpated [No Spinal Tenderness] : no spinal tenderness [No CVA Tenderness] : no ~M costovertebral angle tenderness [Abnormal Walk] : normal gait [Nail Clubbing] : no clubbing  or cyanosis of the fingernails [Motor Tone] : muscle strength and tone were normal [Musculoskeletal - Swelling] : no joint swelling seen [Skin Turgor] : normal skin turgor [Skin Color & Pigmentation] : normal skin color and pigmentation [Oriented To Time, Place, And Person] : oriented to person, place, and time [] : no rash [No Focal Deficits] : no focal deficits [Impaired Insight] : insight and judgment were intact [Affect] : the affect was normal

## 2019-04-10 NOTE — ASSESSMENT
[FreeTextEntry1] : Plan:\par 1) HTN: BP acceptable today, no changes to antihypertensive regimen, will reassess pressure and medications at next visit due to risk for progression. \par 2) Hyperglycemia: HbA1c 5.8 in 10/2018 prior to initiation of metformin, will reassess glycemic control on labs today. \par 3) HLD: lipids acceptable on past labs, will continue management with Crestor 10mg and plan to recheck lipid profile on labs drawn today. \par 4) Previous vitamin D deficiency: will recheck levels on labs today and consider adjustments to Vit D supplementation therapy.\par 5) Persistent sinusitis: refer patient for otorhinolaryngology evaluation Dr. Deangelo Gomez, no changes to antibiotics for now, but in any case, given severity of diffuse and extent sinus opacification, patient will be seen as soon as possible by Dr. Gomez for consideration operative intervention.\par \par Changes to Medications: none.\par \par Labs were drawn and patient will return in next 2-4 weeks for a follow-up appointment.

## 2019-04-10 NOTE — ADDENDUM
[FreeTextEntry1] : I, Rod Gray, acted solely as a scribe for Dr. Jose Ramon Ramires on this date 04/10/2019.

## 2019-04-10 NOTE — HISTORY OF PRESENT ILLNESS
[FreeTextEntry1] : The patient is a 68 year old female presenting for follow-up evaluation and active reassessment of labile HTN, hyperglycemia, obesity, thyromegaly, hypercholesterolemia, and vitamin D deficiency. \par \par Interval Data:\par - Holter monitor on 12/12/18 with Dr. Jadiel De Jesus: sinus rhythm with rare atrial premature beats, heart rate 51-115bpm, rare atrial ectopy, atrial triplet, and longest R-R interval of 1.3 seconds.\par - Chest X-Ray on 1/14/19: right upper lobe pneumonia.\par - Chest X-ray on 2/8/19: no acute pathology.\par - Sinus CT on 4/4/19: severe chronic sinusitis.\par - Labs on 10/3/18: Vit D 25-hydroxy 26.5, Vit D 8-66-ouxrmztuo 63.5, HbA1c 5.8, triglyceride 250, total cholesterol 183, and LDL 89. \par \par Current Complaints:\par - treated for pneumonia with 10 day course of Levaquin 500mg QD by Dr. Hester, reportedly led to complete resolution.\par - then treated for sinusitis with two consecutive 10 day courses Cefdinir 300mg BID by Dr. Hester which failed to relieve symptoms of congestion. Then saw local otorhinolaryngologist, Dr. Melvin Wiederkehr, who initiated 6 day Medrol Dosepak which she just completed but congestion and associated headaches have persisted, Dr. Wiederkehr also prescribed doxycycline 100mg for a month, patient states symptoms were improved during steroid course.\par \par Current Medications: Crestor 10mg QD, Vitamin D 1000 units QD, metformin 500mg BID (added at last visit), doxycycline 100mg QD (added since last visit, per Dr. Hester), Flonase sprays QD.

## 2019-04-10 NOTE — REASON FOR VISIT
[Follow-Up] : a follow-up visit [Spouse] : spouse [FreeTextEntry1] : for reassessment of blood pressure with complaints of persistent sinusitis.

## 2019-04-11 ENCOUNTER — APPOINTMENT (OUTPATIENT)
Dept: OTOLARYNGOLOGY | Facility: CLINIC | Age: 69
End: 2019-04-11
Payer: MEDICARE

## 2019-04-11 VITALS
SYSTOLIC BLOOD PRESSURE: 150 MMHG | WEIGHT: 174 LBS | HEART RATE: 74 BPM | OXYGEN SATURATION: 99 % | BODY MASS INDEX: 32.02 KG/M2 | HEIGHT: 62 IN | DIASTOLIC BLOOD PRESSURE: 89 MMHG

## 2019-04-11 PROCEDURE — 99204 OFFICE O/P NEW MOD 45 MIN: CPT | Mod: 25

## 2019-04-11 PROCEDURE — 31231 NASAL ENDOSCOPY DX: CPT

## 2019-04-11 NOTE — HISTORY OF PRESENT ILLNESS
[de-identified] : 68 year old female Referred for nasal airway obstruction posterior clear to white rhinorrhea for the past several months. Patient was diagnosed in January is having pneumonia as we understand more recently her primary care doctor felt she has sinusitis. He referred her to a specialist who obtained CT scan of the sinuses on April 4, 2019 which showed opacification of the maxillary, ethmoid and frontal sinuses with partial opacification of the left sphenoid. However this study was performed prior to antibiotic treatment. Imaging reviewed today with patient and her . Patient now has been on doxycycline for approximately 3 days and unclear as to any improvement.

## 2019-04-11 NOTE — PROCEDURE
[Rigid Endoscope] : examined with a rigid endoscope [FreeTextEntry6] : Nasal endoscopy performed under local anesthesia Xylocaine and vasoconstricted agent using 4 mm 0° endoscope. This shows septal deviation, particularly to the left without obvious mucopurulent rhinorrhea polyps or other abnormalities. In contrast CT imaging shows extensive sinus disease with the exception of the right sphenoid sinus.

## 2019-04-11 NOTE — ASSESSMENT
[FreeTextEntry1] : T8-year-old female referred by her physician for a acute sinusitis. Apparently patient has had she describes as primarily discomfort over the frontal ethmoid region for at least one month and she's also recovering from pneumonia. She has been started recently on doxycycline twice daily and oral steroids. Review of CT imaging shows opacification as noted above the sinuses.\par \par On physical examination today we do no septal deviation but no obvious mucopurulent rhinorrhea. Patient may have slightly improved since beginning doxycycline in the last few days.\par \par As discussed with the patient we would not normally slept doxycycline for a primary treatment since she has perhaps some improvement we would have her continue this for a for the next several weeks. We're also repeating her course of oral steroid. We have asked her to contact us by e-mail early next week to inform us as to how she feels and if it is necessary to change antibiotics. Anticipate obtaining a new CT scan and 4-5 weeks 2 reevaluate her sinuses and response to treatment.

## 2019-04-19 LAB
24R-OH-CALCIDIOL SERPL-MCNC: 30.9 PG/ML
25(OH)D3 SERPL-MCNC: 34.7 NG/ML
ALBUMIN MFR SERPL ELPH: 62.1 %
ALBUMIN SERPL ELPH-MCNC: 4.9 G/DL
ALBUMIN SERPL-MCNC: 4.4 G/DL
ALBUMIN/GLOB SERPL: 1.6 RATIO
ALDOSTERONE SERUM: 8.7 NG/DL
ALP BLD-CCNC: 71 U/L
ALP BONE SERPL-MCNC: 14 MCG/L
ALPHA1 GLOB MFR SERPL ELPH: 3.8 %
ALPHA1 GLOB SERPL ELPH-MCNC: 0.3 G/DL
ALPHA2 GLOB MFR SERPL ELPH: 9.2 %
ALPHA2 GLOB SERPL ELPH-MCNC: 0.7 G/DL
ALT SERPL-CCNC: 16 U/L
ANA SER IF-ACNC: NEGATIVE
ANION GAP SERPL CALC-SCNC: 13 MMOL/L
APPEARANCE: CLEAR
AST SERPL-CCNC: 16 U/L
B-GLOBULIN MFR SERPL ELPH: 11.6 %
B-GLOBULIN SERPL ELPH-MCNC: 0.8 G/DL
BACTERIA: ABNORMAL
BASOPHILS # BLD AUTO: 0.03 K/UL
BASOPHILS NFR BLD AUTO: 0.3 %
BILIRUB SERPL-MCNC: 0.4 MG/DL
BILIRUBIN URINE: NEGATIVE
BLOOD URINE: NEGATIVE
BUN SERPL-MCNC: 22 MG/DL
C3 SERPL-MCNC: 119 MG/DL
C4 SERPL-MCNC: 36 MG/DL
CALCIUM SERPL-MCNC: 10.3 MG/DL
CALCIUM SERPL-MCNC: 10.3 MG/DL
CHLORIDE SERPL-SCNC: 101 MMOL/L
CHOLEST SERPL-MCNC: 161 MG/DL
CHOLEST/HDLC SERPL: 3.1 RATIO
CO2 SERPL-SCNC: 27 MMOL/L
COLLAGEN CTX SERPL-MCNC: 152 PG/ML
COLOR: NORMAL
CREAT SERPL-MCNC: 0.62 MG/DL
CRP SERPL-MCNC: 0.19 MG/DL
DEPRECATED KAPPA LC FREE/LAMBDA SER: 1.15 RATIO
EOSINOPHIL # BLD AUTO: 0.14 K/UL
EOSINOPHIL NFR BLD AUTO: 1.3 %
ERYTHROCYTE [SEDIMENTATION RATE] IN BLOOD BY WESTERGREN METHOD: 15 MM/HR
ESTIMATED AVERAGE GLUCOSE: 114 MG/DL
FERRITIN SERPL-MCNC: 165 NG/ML
FOLATE SERPL-MCNC: 17.4 NG/ML
GAMMA GLOB FLD ELPH-MCNC: 0.9 G/DL
GAMMA GLOB MFR SERPL ELPH: 13.3 %
GLUCOSE QUALITATIVE U: NEGATIVE
GLUCOSE SERPL-MCNC: 94 MG/DL
HBA1C MFR BLD HPLC: 5.6 %
HBV SURFACE AB SER QL: NONREACTIVE
HBV SURFACE AG SER QL: NONREACTIVE
HCT VFR BLD CALC: 46.5 %
HCV AB SER QL: NONREACTIVE
HCV S/CO RATIO: 0.07 S/CO
HCYS SERPL-MCNC: 10.2 UMOL/L
HDLC SERPL-MCNC: 52 MG/DL
HGB BLD-MCNC: 14.9 G/DL
HYALINE CASTS: 0 /LPF
IGA SER QL IEP: 190 MG/DL
IGG SER QL IEP: 822 MG/DL
IGM SER QL IEP: 135 MG/DL
IMM GRANULOCYTES NFR BLD AUTO: 0.4 %
INTERPRETATION SERPL IEP-IMP: NORMAL
IRON SATN MFR SERPL: 13 %
IRON SERPL-MCNC: 41 UG/DL
KAPPA LC CSF-MCNC: 1.76 MG/DL
KAPPA LC SERPL-MCNC: 2.02 MG/DL
KETONES URINE: NEGATIVE
LDLC SERPL CALC-MCNC: 68 MG/DL
LEUKOCYTE ESTERASE URINE: ABNORMAL
LYMPHOCYTES # BLD AUTO: 2.96 K/UL
LYMPHOCYTES NFR BLD AUTO: 27.1 %
M PROTEIN SPEC IFE-MCNC: NORMAL
MAGNESIUM SERPL-MCNC: 2 MG/DL
MAN DIFF?: NORMAL
MCHC RBC-ENTMCNC: 30.1 PG
MCHC RBC-ENTMCNC: 32 GM/DL
MCV RBC AUTO: 93.9 FL
METHYLMALONATE SERPL-SCNC: 171 NMOL/L
MICROSCOPIC-UA: NORMAL
MONOCYTES # BLD AUTO: 0.69 K/UL
MONOCYTES NFR BLD AUTO: 6.3 %
MPO AB + PR3 PNL SER: NORMAL
NEUTROPHILS # BLD AUTO: 7.05 K/UL
NEUTROPHILS NFR BLD AUTO: 64.6 %
NITRITE URINE: NEGATIVE
PARATHYROID HORMONE INTACT: 33 PG/ML
PH URINE: 7
PHOSPHATE SERPL-MCNC: 4.6 MG/DL
PLATELET # BLD AUTO: 247 K/UL
POTASSIUM SERPL-SCNC: 4.5 MMOL/L
PROT SERPL-MCNC: 7.1 G/DL
PROTEIN URINE: NEGATIVE
RBC # BLD: 4.95 M/UL
RBC # FLD: 13.2 %
RED BLOOD CELLS URINE: 0 /HPF
RENIN PLASMA: 5 PG/ML
RHEUMATOID FACT SER QL: <10 IU/ML
SODIUM SERPL-SCNC: 141 MMOL/L
SPECIFIC GRAVITY URINE: 1.01
SQUAMOUS EPITHELIAL CELLS: 8 /HPF
TIBC SERPL-MCNC: 320 UG/DL
TRIGL SERPL-MCNC: 205 MG/DL
UIBC SERPL-MCNC: 279 UG/DL
URATE SERPL-MCNC: 4.5 MG/DL
UROBILINOGEN URINE: NORMAL
VIT B12 SERPL-MCNC: 687 PG/ML
WBC # FLD AUTO: 10.91 K/UL
WHITE BLOOD CELLS URINE: 4 /HPF

## 2019-05-08 ENCOUNTER — FORM ENCOUNTER (OUTPATIENT)
Age: 69
End: 2019-05-08

## 2019-05-09 ENCOUNTER — APPOINTMENT (OUTPATIENT)
Dept: CT IMAGING | Facility: HOSPITAL | Age: 69
End: 2019-05-09
Payer: MEDICARE

## 2019-05-09 ENCOUNTER — OUTPATIENT (OUTPATIENT)
Dept: OUTPATIENT SERVICES | Facility: HOSPITAL | Age: 69
LOS: 1 days | End: 2019-05-09
Payer: MEDICARE

## 2019-05-09 ENCOUNTER — APPOINTMENT (OUTPATIENT)
Dept: OTOLARYNGOLOGY | Facility: CLINIC | Age: 69
End: 2019-05-09
Payer: MEDICARE

## 2019-05-09 VITALS
BODY MASS INDEX: 32.02 KG/M2 | DIASTOLIC BLOOD PRESSURE: 85 MMHG | HEIGHT: 62 IN | OXYGEN SATURATION: 98 % | SYSTOLIC BLOOD PRESSURE: 130 MMHG | HEART RATE: 75 BPM | WEIGHT: 174 LBS

## 2019-05-09 DIAGNOSIS — Z87.09 PERSONAL HISTORY OF OTHER DISEASES OF THE RESPIRATORY SYSTEM: ICD-10-CM

## 2019-05-09 PROCEDURE — 99214 OFFICE O/P EST MOD 30 MIN: CPT | Mod: 25

## 2019-05-09 PROCEDURE — 70486 CT MAXILLOFACIAL W/O DYE: CPT

## 2019-05-09 PROCEDURE — 70486 CT MAXILLOFACIAL W/O DYE: CPT | Mod: 26

## 2019-05-09 PROCEDURE — 31231 NASAL ENDOSCOPY DX: CPT

## 2019-05-09 NOTE — PROCEDURE
[Osteomeatal Pathology] : osteomeatal pathology [Anterior rhinoscopy insufficient to account for symptoms] : anterior rhinoscopy insufficient to account for symptoms [Topical Lidocaine] : topical lidocaine [Oxymetazoline HCl] : oxymetazoline HCl [Rigid Endoscope] : examined with a rigid endoscope [Deviated to the Lt] : deviated to the left [Normal] : the nasopharynx was normal [Paranasal Sinuses Maxillary Sinus] : no maxillary polyps

## 2019-05-09 NOTE — HISTORY OF PRESENT ILLNESS
[de-identified] : 68 year old female Referred for nasal airway obstruction posterior clear to white rhinorrhea for the past several months. Patient was diagnosed in January is having pneumonia as we understand more recently her primary care doctor felt she has sinusitis. He referred her to a specialist who obtained CT scan of the sinuses on April 4, 2019 which showed opacification of the maxillary, ethmoid and frontal sinuses with partial opacification of the left sphenoid. However this study was performed prior to antibiotic treatment. Imaging reviewed today with patient and her . Patient now has been on doxycycline for approximately 3 days and unclear as to any improvement. [FreeTextEntry1] : 5-9-2019- Patient returns after 21 days of antibiotics for sinusitis. Now feeling "terrific".CT performed today.

## 2019-05-09 NOTE — ASSESSMENT
[FreeTextEntry1] : Patient returns feeling well. CT now shows small amount floor of right maxillary sinus.  As patient feeling well and nd examination WNL. As patient is well we have recommended saline nasal lavage and Flonase. Recheck  as needed.

## 2019-07-17 ENCOUNTER — APPOINTMENT (OUTPATIENT)
Dept: NEPHROLOGY | Facility: CLINIC | Age: 69
End: 2019-07-17
Payer: MEDICARE

## 2019-07-17 ENCOUNTER — LABORATORY RESULT (OUTPATIENT)
Age: 69
End: 2019-07-17

## 2019-07-17 VITALS — HEART RATE: 60 BPM | DIASTOLIC BLOOD PRESSURE: 78 MMHG | SYSTOLIC BLOOD PRESSURE: 120 MMHG

## 2019-07-17 VITALS — TEMPERATURE: 97 F

## 2019-07-17 VITALS — WEIGHT: 175.25 LBS | HEART RATE: 64 BPM | BODY MASS INDEX: 32.25 KG/M2 | HEIGHT: 62 IN | OXYGEN SATURATION: 98 %

## 2019-07-17 DIAGNOSIS — E55.9 VITAMIN D DEFICIENCY, UNSPECIFIED: ICD-10-CM

## 2019-07-17 PROCEDURE — 99214 OFFICE O/P EST MOD 30 MIN: CPT | Mod: 25

## 2019-07-17 PROCEDURE — 36415 COLL VENOUS BLD VENIPUNCTURE: CPT

## 2019-07-17 NOTE — PHYSICAL EXAM
[General Appearance - Alert] : alert [General Appearance - In No Acute Distress] : in no acute distress [Sclera] : the sclera and conjunctiva were normal [PERRL With Normal Accommodation] : pupils were equal in size, round, and reactive to light [Extraocular Movements] : extraocular movements were intact [Outer Ear] : the ears and nose were normal in appearance [Oropharynx] : the oropharynx was normal [Neck Appearance] : the appearance of the neck was normal [Neck Cervical Mass (___cm)] : no neck mass was observed [Jugular Venous Distention Increased] : there was no jugular-venous distention [Thyroid Diffuse Enlargement] : the thyroid was not enlarged [Thyroid Nodule] : there were no palpable thyroid nodules [Auscultation Breath Sounds / Voice Sounds] : lungs were clear to auscultation bilaterally [Heart Rate And Rhythm] : heart rate was normal and rhythm regular [Heart Sounds] : normal S1 and S2 [Heart Sounds Gallop] : no gallops [Murmurs] : no murmurs [Heart Sounds Pericardial Friction Rub] : no pericardial rub [Edema] : there was no peripheral edema [Bowel Sounds] : normal bowel sounds [Abdomen Soft] : soft [Abdomen Tenderness] : non-tender [Abdomen Mass (___ Cm)] : no abdominal mass palpated [No CVA Tenderness] : no ~M costovertebral angle tenderness [No Spinal Tenderness] : no spinal tenderness [Abnormal Walk] : normal gait [Nail Clubbing] : no clubbing  or cyanosis of the fingernails [Musculoskeletal - Swelling] : no joint swelling seen [Motor Tone] : muscle strength and tone were normal [Skin Color & Pigmentation] : normal skin color and pigmentation [Skin Turgor] : normal skin turgor [] : no rash [Cranial Nerves] : cranial nerves 2-12 were intact [Motor Exam] : the motor exam was normal [No Focal Deficits] : no focal deficits [Oriented To Time, Place, And Person] : oriented to person, place, and time [Impaired Insight] : insight and judgment were intact [Affect] : the affect was normal

## 2019-07-17 NOTE — ADDENDUM
[FreeTextEntry1] :  Documented by Julián Angel acting as a scribe for Dr. Jose Ramon Ramires on 07/17/2019.

## 2019-07-17 NOTE — REASON FOR VISIT
[Follow-Up] : a follow-up visit [Spouse] : spouse [FreeTextEntry1] : for frequent urination, and active evaluation of blood pressure management. HLD, and hyperglycemia.

## 2019-07-17 NOTE — ASSESSMENT
[FreeTextEntry1] : Plan:\par 1) HTN: BP acceptable today on current regimen and therefore will not adjust patient's antihypertensive medications, will reassess pressure and regimen at next evaluation.\par 2) Hyperglycemia: Most recent HbA1C of 5.6 is acceptable. Will continue patient on current regimen of metformin 500mg BID and will reassess with blood draw today.\par 3) HLD: Labs were reviewed with patient in depth and told her that her lipids are found to be well-controlled and within normal limits on current therapy of Crestor 10mg QD. Due to patient's tolerance of current treatment and acceptable lab results she will continue to take current regimen. We will continue to monitor with lipid profile on blood work today.  \par 4) Prior Vitamin D deficiency: Vitamin d levels are acceptable on recent labs. Will continue current regimen and reassess with blood draw today.\par 5) Frequent urination: Collected urinal culture today, and depending on the results will consider intravaginal estrogen. Patient has a gynecological evaluation scheduled next week with Dr. Davis (tel: [phone]). Will refer patient for sonogram of kidneys and bladder, and depending on results will refer patient for urology evaluation.\par 6) Sinus congestion: Will continue to follow Dr. Pena. \par \par Changes to Medications: none\par \par Labs were drawn and patient will return in 2 months for a follow-up appointment.

## 2019-07-17 NOTE — END OF VISIT
[FreeTextEntry3] : All medical record entries made by the Scribe were at my, Dr. Jose Ramon Ramires, direction and personally dictated by me on 07/17/2019. I have reviewed the chart and agree that the record accurately reflects my personal performance of the history, physical exam, assessment and plan. I have also personally directed, reviewed, and agreed with the chart.

## 2019-07-17 NOTE — HISTORY OF PRESENT ILLNESS
[FreeTextEntry1] : The patient is a 68 year old female presenting for follow-up evaluation and active reassessment of labile HTN, hyperglycemia, obesity, thyromegaly, hypercholesterolemia, and vitamin D deficiency. \par \par Interval Data:\par - On 4/11/19 patient had an ENT evaluation with Dr. Deangelo Gomez for consultation for sinusitis. Patient received a nasal endoscopy performed under local anesthesia Xylocaine and vasoconstricted agent using 4 mm 0° endoscope. This shows septal deviation, particularly to the left without obvious mucopurulent rhinorrhea polyps or other abnormalities. In contrast CT imaging shows extensive sinus disease with the exception of the right sphenoid sinus. Patient was changed from doxycycline to Augmentin. On 5/9/19 patient was again seen by Dr. Gomez and was examined with a rigid endoscope. Nasal mucosa findings: the nasal mucosa was normal. Septum findings: deviated to the left. Nasopharynx findings: the nasopharynx was normal. Middle Meatus findings: the middle meatus had no abnormalities. Sinuses findings: no maxillary purulence and no maxillary polyps. CT then showed small amount floor of right maxillary sinus. Note is on record.\par - Labs from 4/10/19 reveal: WBC 10.91, HGB 14.9, HCT 46.5, ferritin 165, Phosphorous 4.6, triglyceride 205, total cholesterol 161, LDL 68, iron saturation 13%, HbA1C 5.6, Vit D 25 Hydroxy 34.7, Vit D 1, 25 Dihydroxy 30.9. \par - On 6/20/19 patient received a bilateral mammography screening that showed no evidence of malignancy, note is on record.\par \par Current Complaints:\par - Patient had a dental appointment today with Dr. Robertson where she had a tooth pulled due to a cracked tooth with oral infection\par - She reports frequent urgent urination. Denies pain and burning. \par - Patient reports nasal congestion and generally not feeling well. She denies having a fever and says she began nasal irrigation this morning.\par - Patient inquires if she needs to continue taking Crestor. Reviewed labs with patient in depth. She reports fungal infection on her foot that she was reportedly told she cannot treat while also being on statins.\par - She reports a scheduled appointment next week with Dr. Zuleima Davis (tel: [phone]) for gynecologic evaluation \par \par Current Medications: Crestor 10mg QD, Vitamin D 1000 units QD, metformin 500mg BID, Augmentin (per Dr. Pena), Flonase sprays QD. \par \par Previous medications: doxycycline 100mg QD (discontinued by Dr. Gomez since last visit).

## 2019-07-19 LAB
24R-OH-CALCIDIOL SERPL-MCNC: 41.1 PG/ML
25(OH)D3 SERPL-MCNC: 33.7 NG/ML
ALBUMIN MFR SERPL ELPH: 63.1 %
ALBUMIN SERPL ELPH-MCNC: 4.5 G/DL
ALBUMIN SERPL-MCNC: 4.3 G/DL
ALBUMIN/GLOB SERPL: 1.7 RATIO
ALDOSTERONE SERUM: 7.6 NG/DL
ALP BLD-CCNC: 65 U/L
ALP BONE SERPL-MCNC: 12 MCG/L
ALPHA1 GLOB MFR SERPL ELPH: 4.3 %
ALPHA1 GLOB SERPL ELPH-MCNC: 0.3 G/DL
ALPHA2 GLOB MFR SERPL ELPH: 8.9 %
ALPHA2 GLOB SERPL ELPH-MCNC: 0.6 G/DL
ALT SERPL-CCNC: 19 U/L
ANA SER IF-ACNC: NEGATIVE
ANION GAP SERPL CALC-SCNC: 14 MMOL/L
APPEARANCE: ABNORMAL
AST SERPL-CCNC: 20 U/L
B-GLOBULIN MFR SERPL ELPH: 11.8 %
B-GLOBULIN SERPL ELPH-MCNC: 0.8 G/DL
BACTERIA UR CULT: NORMAL
BACTERIA: ABNORMAL
BASOPHILS # BLD AUTO: 0.03 K/UL
BASOPHILS NFR BLD AUTO: 0.4 %
BILIRUB SERPL-MCNC: 0.5 MG/DL
BILIRUBIN URINE: NEGATIVE
BLOOD URINE: NEGATIVE
BUN SERPL-MCNC: 18 MG/DL
C3 SERPL-MCNC: 136 MG/DL
C4 SERPL-MCNC: 44 MG/DL
CALCIUM SERPL-MCNC: 9.9 MG/DL
CALCIUM SERPL-MCNC: 9.9 MG/DL
CHLORIDE SERPL-SCNC: 103 MMOL/L
CHOLEST SERPL-MCNC: 130 MG/DL
CHOLEST/HDLC SERPL: 2.8 RATIO
CO2 SERPL-SCNC: 25 MMOL/L
COLOR: NORMAL
CREAT SERPL-MCNC: 0.7 MG/DL
CRP SERPL-MCNC: 0.98 MG/DL
DEPRECATED KAPPA LC FREE/LAMBDA SER: 1.54 RATIO
EOSINOPHIL # BLD AUTO: 0.08 K/UL
EOSINOPHIL NFR BLD AUTO: 1 %
ERYTHROCYTE [SEDIMENTATION RATE] IN BLOOD BY WESTERGREN METHOD: 24 MM/HR
ESTIMATED AVERAGE GLUCOSE: 108 MG/DL
FERRITIN SERPL-MCNC: 135 NG/ML
FOLATE SERPL-MCNC: 16.9 NG/ML
GAMMA GLOB FLD ELPH-MCNC: 0.8 G/DL
GAMMA GLOB MFR SERPL ELPH: 11.9 %
GLUCOSE QUALITATIVE U: NEGATIVE
GLUCOSE SERPL-MCNC: 89 MG/DL
HBA1C MFR BLD HPLC: 5.4 %
HBV SURFACE AB SER QL: NONREACTIVE
HBV SURFACE AG SER QL: NONREACTIVE
HCT VFR BLD CALC: 42.4 %
HCV AB SER QL: NONREACTIVE
HCV S/CO RATIO: 0.09 S/CO
HCYS SERPL-MCNC: 10.4 UMOL/L
HDLC SERPL-MCNC: 47 MG/DL
HGB BLD-MCNC: 13.9 G/DL
HYALINE CASTS: 1 /LPF
IGA SER QL IEP: 172 MG/DL
IGG SER QL IEP: 747 MG/DL
IGM SER QL IEP: 116 MG/DL
IMM GRANULOCYTES NFR BLD AUTO: 0.4 %
INTERPRETATION SERPL IEP-IMP: NORMAL
IRON SATN MFR SERPL: 16 %
IRON SERPL-MCNC: 49 UG/DL
KAPPA LC CSF-MCNC: 1.73 MG/DL
KAPPA LC SERPL-MCNC: 2.67 MG/DL
KETONES URINE: NEGATIVE
LDLC SERPL CALC-MCNC: 65 MG/DL
LEUKOCYTE ESTERASE URINE: ABNORMAL
LYMPHOCYTES # BLD AUTO: 1.93 K/UL
LYMPHOCYTES NFR BLD AUTO: 24.6 %
M PROTEIN SPEC IFE-MCNC: NORMAL
MAGNESIUM SERPL-MCNC: 2 MG/DL
MAN DIFF?: NORMAL
MCHC RBC-ENTMCNC: 30.4 PG
MCHC RBC-ENTMCNC: 32.8 GM/DL
MCV RBC AUTO: 92.8 FL
MICROSCOPIC-UA: NORMAL
MONOCYTES # BLD AUTO: 0.78 K/UL
MONOCYTES NFR BLD AUTO: 10 %
MPO AB + PR3 PNL SER: NORMAL
NEUTROPHILS # BLD AUTO: 4.98 K/UL
NEUTROPHILS NFR BLD AUTO: 63.6 %
NITRITE URINE: NEGATIVE
PARATHYROID HORMONE INTACT: 40 PG/ML
PH URINE: 6.5
PHOSPHATE SERPL-MCNC: 4.3 MG/DL
PLATELET # BLD AUTO: 192 K/UL
POTASSIUM SERPL-SCNC: 4.3 MMOL/L
PROT SERPL-MCNC: 6.8 G/DL
PROTEIN URINE: NEGATIVE
RBC # BLD: 4.57 M/UL
RBC # FLD: 12.5 %
RED BLOOD CELLS URINE: 1 /HPF
RENIN PLASMA: 4.2 PG/ML
RHEUMATOID FACT SER QL: <10 IU/ML
SODIUM SERPL-SCNC: 142 MMOL/L
SPECIFIC GRAVITY URINE: 1.01
SQUAMOUS EPITHELIAL CELLS: 16 /HPF
T3FREE SERPL-MCNC: 3.05 PG/ML
T3RU NFR SERPL: 1.1 TBI
T4 FREE SERPL-MCNC: 1.4 NG/DL
T4 SERPL-MCNC: 8.9 UG/DL
THYROGLOB AB SERPL-ACNC: <20 IU/ML
THYROPEROXIDASE AB SERPL IA-ACNC: <10 IU/ML
TIBC SERPL-MCNC: 310 UG/DL
TRIGL SERPL-MCNC: 88 MG/DL
TSH SERPL-ACNC: 1.83 UIU/ML
UIBC SERPL-MCNC: 261 UG/DL
URATE SERPL-MCNC: 4.8 MG/DL
URINE COMMENTS: NORMAL
UROBILINOGEN URINE: NORMAL
VIT B12 SERPL-MCNC: 456 PG/ML
WBC # FLD AUTO: 7.83 K/UL
WHITE BLOOD CELLS URINE: 16 /HPF

## 2019-07-21 LAB
COLLAGEN CTX SERPL-MCNC: 205 PG/ML
METHYLMALONATE SERPL-SCNC: 171 NMOL/L

## 2019-07-23 ENCOUNTER — APPOINTMENT (OUTPATIENT)
Dept: OTOLARYNGOLOGY | Facility: CLINIC | Age: 69
End: 2019-07-23

## 2019-12-23 ENCOUNTER — APPOINTMENT (OUTPATIENT)
Dept: NEPHROLOGY | Facility: CLINIC | Age: 69
End: 2019-12-23
Payer: MEDICARE

## 2019-12-23 ENCOUNTER — LABORATORY RESULT (OUTPATIENT)
Age: 69
End: 2019-12-23

## 2019-12-23 VITALS — HEART RATE: 60 BPM | SYSTOLIC BLOOD PRESSURE: 146 MMHG | DIASTOLIC BLOOD PRESSURE: 80 MMHG

## 2019-12-23 VITALS — SYSTOLIC BLOOD PRESSURE: 138 MMHG | DIASTOLIC BLOOD PRESSURE: 80 MMHG | HEART RATE: 84 BPM

## 2019-12-23 VITALS — SYSTOLIC BLOOD PRESSURE: 138 MMHG | DIASTOLIC BLOOD PRESSURE: 80 MMHG

## 2019-12-23 VITALS — HEIGHT: 62 IN | BODY MASS INDEX: 32.25 KG/M2 | HEART RATE: 68 BPM | OXYGEN SATURATION: 97 % | WEIGHT: 175.25 LBS

## 2019-12-23 PROCEDURE — 90662 IIV NO PRSV INCREASED AG IM: CPT

## 2019-12-23 PROCEDURE — 36415 COLL VENOUS BLD VENIPUNCTURE: CPT

## 2019-12-23 PROCEDURE — G0008: CPT

## 2019-12-23 PROCEDURE — 99214 OFFICE O/P EST MOD 30 MIN: CPT | Mod: 25

## 2019-12-23 NOTE — ADDENDUM
[FreeTextEntry1] : Documented by Jose A Mercer acting as a scribe for Dr. Jose Ramon Ramires on 12/23/2019.

## 2019-12-23 NOTE — PHYSICAL EXAM
[General Appearance - In No Acute Distress] : in no acute distress [General Appearance - Alert] : alert [Extraocular Movements] : extraocular movements were intact [PERRL With Normal Accommodation] : pupils were equal in size, round, and reactive to light [Sclera] : the sclera and conjunctiva were normal [Outer Ear] : the ears and nose were normal in appearance [Oropharynx] : the oropharynx was normal [Neck Appearance] : the appearance of the neck was normal [Neck Cervical Mass (___cm)] : no neck mass was observed [Jugular Venous Distention Increased] : there was no jugular-venous distention [Thyroid Diffuse Enlargement] : the thyroid was not enlarged [Thyroid Nodule] : there were no palpable thyroid nodules [Auscultation Breath Sounds / Voice Sounds] : lungs were clear to auscultation bilaterally [Heart Sounds] : normal S1 and S2 [Murmurs] : no murmurs [Heart Sounds Gallop] : no gallops [Heart Rate And Rhythm] : heart rate was normal and rhythm regular [Heart Sounds Pericardial Friction Rub] : no pericardial rub [Edema] : there was no peripheral edema [Bowel Sounds] : normal bowel sounds [Abdomen Soft] : soft [Abdomen Tenderness] : non-tender [Abdomen Mass (___ Cm)] : no abdominal mass palpated [No CVA Tenderness] : no ~M costovertebral angle tenderness [No Spinal Tenderness] : no spinal tenderness [Nail Clubbing] : no clubbing  or cyanosis of the fingernails [Musculoskeletal - Swelling] : no joint swelling seen [Abnormal Walk] : normal gait [] : no rash [Skin Color & Pigmentation] : normal skin color and pigmentation [Skin Turgor] : normal skin turgor [Motor Tone] : muscle strength and tone were normal [Impaired Insight] : insight and judgment were intact [Oriented To Time, Place, And Person] : oriented to person, place, and time [No Focal Deficits] : no focal deficits [Affect] : the affect was normal

## 2019-12-23 NOTE — END OF VISIT
[FreeTextEntry3] : All medical record entries made by the Scribe were at my, Dr. Jose Ramon Ramires, direction and personally dictated by me on 12/23/2019. I have reviewed the chart and agree that the record accurately reflects my personal performance of the history, physical exam, assessment and plan. I have also personally directed, reviewed, and agreed with the chart.

## 2019-12-23 NOTE — ASSESSMENT
[FreeTextEntry1] : Plan:\par 1) HTN: Patient's BP is hypertensive in the office today. Advised patient to decrease sodium and calorie intake and exercise regularly. Will reassess pressure and regimen at next evaluation. She understands that if her BP is not improved in the next visit, she will be started on antihypertensive medication. \par 2) HLD: Patient had excellent control while on medication but has discontinued Crestor for unclear reason. If her lipids level are elevated as expected, will advise patient to resume Crestor. \par 3) Hyperglycemia: Most recent HbA1C of 5.4% is acceptable at this time. Advised patient to continue lifestyle management with healthy dieting and routine exercise and to continue on current antihyperglycemic regimen and reassess with labs today. \par 4) Have administered high dose flu vaccine into R deltoid without incident. \par \par Changes to Medications: none\par Labs were drawn and patient will return in 4-6 weeks for a follow-up appointment.

## 2019-12-23 NOTE — HISTORY OF PRESENT ILLNESS
[FreeTextEntry1] : The patient is a 69year old female presenting for follow-up evaluation and annual influenza vaccine and active reassessment of labile HTN, hyperglycemia, obesity, thyromegaly, hypercholesterolemia, and vitamin D deficiency. \par \par Interval Data:\par - Labs from 7/17/19 reveal: C-Reactive Protein 0.98, sed rate 24, C4 44\par - Labs from 4/10/19 reveal: WBC 10.91, HGB 14.9, HCT 46.5, ferritin 165, Phosphorous 4.6, triglyceride 205, total cholesterol 161, LDL 68, iron saturation 13%, HbA1C 5.6, Vit D 25 Hydroxy 34.7, Vit D 1, 25 Dihydroxy 30.9. \par \par Current Complaints:\par - Patient feels generally well. Patient presents for the annual influenza vaccine. \par - Patient has not been taking Crestor for past two weeks. \par \par Current Medications: Crestor 10mg QD, Vitamin D 1000 units QD, metformin 500mg BID, Augmentin (per Dr. Pena), Flonase sprays QD.

## 2019-12-25 LAB
24R-OH-CALCIDIOL SERPL-MCNC: 53.2 PG/ML
25(OH)D3 SERPL-MCNC: 40.1 NG/ML
ALBUMIN MFR SERPL ELPH: 64.2 %
ALBUMIN SERPL ELPH-MCNC: 4.4 G/DL
ALBUMIN SERPL-MCNC: 4.5 G/DL
ALBUMIN/GLOB SERPL: 1.8 RATIO
ALP BLD-CCNC: 77 U/L
ALPHA1 GLOB MFR SERPL ELPH: 3.7 %
ALPHA1 GLOB SERPL ELPH-MCNC: 0.3 G/DL
ALPHA2 GLOB MFR SERPL ELPH: 8.8 %
ALPHA2 GLOB SERPL ELPH-MCNC: 0.6 G/DL
ALT SERPL-CCNC: 23 U/L
ANION GAP SERPL CALC-SCNC: 12 MMOL/L
APPEARANCE: ABNORMAL
AST SERPL-CCNC: 23 U/L
B-GLOBULIN MFR SERPL ELPH: 11.5 %
B-GLOBULIN SERPL ELPH-MCNC: 0.8 G/DL
BACTERIA: ABNORMAL
BASOPHILS # BLD AUTO: 0.04 K/UL
BASOPHILS NFR BLD AUTO: 0.5 %
BILIRUB SERPL-MCNC: 0.4 MG/DL
BILIRUBIN URINE: NEGATIVE
BLOOD URINE: NEGATIVE
BUN SERPL-MCNC: 21 MG/DL
CALCIUM SERPL-MCNC: 9.6 MG/DL
CALCIUM SERPL-MCNC: 9.6 MG/DL
CHLORIDE SERPL-SCNC: 104 MMOL/L
CHOLEST SERPL-MCNC: 189 MG/DL
CHOLEST/HDLC SERPL: 4.2 RATIO
CO2 SERPL-SCNC: 27 MMOL/L
COLOR: NORMAL
CREAT SERPL-MCNC: 0.66 MG/DL
CRP SERPL-MCNC: 0.38 MG/DL
DEPRECATED KAPPA LC FREE/LAMBDA SER: 1.31 RATIO
EOSINOPHIL # BLD AUTO: 0.09 K/UL
EOSINOPHIL NFR BLD AUTO: 1.1 %
ERYTHROCYTE [SEDIMENTATION RATE] IN BLOOD BY WESTERGREN METHOD: 23 MM/HR
ESTIMATED AVERAGE GLUCOSE: 108 MG/DL
FERRITIN SERPL-MCNC: 175 NG/ML
FOLATE SERPL-MCNC: 15.3 NG/ML
GAMMA GLOB FLD ELPH-MCNC: 0.8 G/DL
GAMMA GLOB MFR SERPL ELPH: 11.8 %
GLUCOSE QUALITATIVE U: NEGATIVE
GLUCOSE SERPL-MCNC: 95 MG/DL
HBA1C MFR BLD HPLC: 5.4 %
HCT VFR BLD CALC: 42.8 %
HCYS SERPL-MCNC: 9.8 UMOL/L
HDLC SERPL-MCNC: 45 MG/DL
HGB BLD-MCNC: 14.1 G/DL
HYALINE CASTS: 0 /LPF
IGA SER QL IEP: 189 MG/DL
IGG SER QL IEP: 847 MG/DL
IGM SER QL IEP: 124 MG/DL
IMM GRANULOCYTES NFR BLD AUTO: 0.3 %
INTERPRETATION SERPL IEP-IMP: NORMAL
IRON SATN MFR SERPL: 28 %
IRON SERPL-MCNC: 84 UG/DL
KAPPA LC CSF-MCNC: 1.66 MG/DL
KAPPA LC SERPL-MCNC: 2.17 MG/DL
KETONES URINE: NEGATIVE
LDLC SERPL CALC-MCNC: 90 MG/DL
LEUKOCYTE ESTERASE URINE: ABNORMAL
LYMPHOCYTES # BLD AUTO: 2.14 K/UL
LYMPHOCYTES NFR BLD AUTO: 26.8 %
M PROTEIN SPEC IFE-MCNC: NORMAL
MAGNESIUM SERPL-MCNC: 2 MG/DL
MAN DIFF?: NORMAL
MCHC RBC-ENTMCNC: 31 PG
MCHC RBC-ENTMCNC: 32.9 GM/DL
MCV RBC AUTO: 94.1 FL
MICROSCOPIC-UA: NORMAL
MONOCYTES # BLD AUTO: 0.63 K/UL
MONOCYTES NFR BLD AUTO: 7.9 %
NEUTROPHILS # BLD AUTO: 5.06 K/UL
NEUTROPHILS NFR BLD AUTO: 63.4 %
NITRITE URINE: NEGATIVE
PARATHYROID HORMONE INTACT: 54 PG/ML
PH URINE: 8
PHOSPHATE SERPL-MCNC: 3.5 MG/DL
PLATELET # BLD AUTO: 198 K/UL
POTASSIUM SERPL-SCNC: 4.1 MMOL/L
PROT SERPL-MCNC: 7 G/DL
PROTEIN URINE: NEGATIVE
RBC # BLD: 4.55 M/UL
RBC # FLD: 12.8 %
RED BLOOD CELLS URINE: 2 /HPF
SODIUM SERPL-SCNC: 143 MMOL/L
SPECIFIC GRAVITY URINE: 1.01
SQUAMOUS EPITHELIAL CELLS: 13 /HPF
T3FREE SERPL-MCNC: 2.92 PG/ML
T3RU NFR SERPL: 1.1 TBI
T4 FREE SERPL-MCNC: 1.3 NG/DL
T4 SERPL-MCNC: 8.4 UG/DL
THYROGLOB AB SERPL-ACNC: <20 IU/ML
THYROPEROXIDASE AB SERPL IA-ACNC: <10 IU/ML
TIBC SERPL-MCNC: 303 UG/DL
TRIGL SERPL-MCNC: 268 MG/DL
TSH SERPL-ACNC: 1.85 UIU/ML
UIBC SERPL-MCNC: 219 UG/DL
URATE SERPL-MCNC: 4.5 MG/DL
UROBILINOGEN URINE: NORMAL
VIT B12 SERPL-MCNC: 444 PG/ML
WBC # FLD AUTO: 7.98 K/UL
WHITE BLOOD CELLS URINE: 37 /HPF

## 2019-12-26 LAB
ALP BONE SERPL-MCNC: 13 MCG/L
COLLAGEN CTX SERPL-MCNC: 180 PG/ML

## 2019-12-29 LAB — METHYLMALONATE SERPL-SCNC: 173 NMOL/L

## 2020-01-27 ENCOUNTER — APPOINTMENT (OUTPATIENT)
Dept: NEPHROLOGY | Facility: CLINIC | Age: 70
End: 2020-01-27
Payer: MEDICARE

## 2020-01-27 VITALS — SYSTOLIC BLOOD PRESSURE: 120 MMHG | DIASTOLIC BLOOD PRESSURE: 86 MMHG | HEART RATE: 72 BPM

## 2020-01-27 VITALS
BODY MASS INDEX: 32.01 KG/M2 | SYSTOLIC BLOOD PRESSURE: 138 MMHG | WEIGHT: 175 LBS | DIASTOLIC BLOOD PRESSURE: 80 MMHG | HEART RATE: 72 BPM

## 2020-01-27 VITALS — OXYGEN SATURATION: 97 % | HEART RATE: 66 BPM | HEIGHT: 62 IN

## 2020-01-27 VITALS — SYSTOLIC BLOOD PRESSURE: 136 MMHG | DIASTOLIC BLOOD PRESSURE: 80 MMHG

## 2020-01-27 PROCEDURE — 93000 ELECTROCARDIOGRAM COMPLETE: CPT

## 2020-01-27 PROCEDURE — 99214 OFFICE O/P EST MOD 30 MIN: CPT | Mod: 25

## 2020-01-27 NOTE — ADDENDUM
[FreeTextEntry1] :  Documented by Julián nAgel acting as a scribe for Dr. Jose Ramon Ramires on 01/27/2020.

## 2020-01-27 NOTE — HISTORY OF PRESENT ILLNESS
[FreeTextEntry1] : The patient is a 69 year old female presenting for follow-up evaluation and active reassessment of labile HTN, hyperglycemia, HLD, and vitamin D deficiency. \par \par Interval Data:\par - Labs from 12/23/19 reveal: ferritin 175, sed rate 23, triglyceride 268, HDL 45, HbA1C 5.4.\par \par Current Complaints:\par - Patient feels generally well with no acute complaints. She reports that she is sleeping better and exercising by swimming three times per week. Pt denies any urinary symptoms.\par \par Current Medications: Crestor 10mg QD, Vitamin D 1000 units QD, metformin 500mg BID, Augmentin (per Dr. Pena), Flonase sprays QD.

## 2020-01-27 NOTE — END OF VISIT
[FreeTextEntry3] : All medical record entries made by the Scribe were at my, Dr. Jose Ramon Ramires, direction and personally dictated by me on 01/27/2020. I have reviewed the chart and agree that the record accurately reflects my personal performance of the history, physical exam, assessment and plan. I have also personally directed, reviewed, and agreed with the chart.

## 2020-01-27 NOTE — ASSESSMENT
[FreeTextEntry1] : Plan:\par 1) HTN: Pressure is slightly improved from last visit, though still higher than target pressure and borderline hypertensive. Start patient on lisinopril 5mg QD, and patient will continue a low Na diet with weight loss efforts. Will continue to assess BP at next visit in one month. \par 2) HLD: lipids found to be well-controlled and acceptable on current therapy of Crestor 10mg QD. Due to patient's tolerance of current treatment and acceptable lab results we will not adjust course at this time. Will continue to monitor with lipid profile on blood work today. \par 3) Hyperglycemia: Most recent HbA1C of 5.4 is acceptable at this time. Advised patient to continue lifestyle management with healthy dieting and routine exercise and to continue on current antihyperglycemic regimen. \par 4) Patient received annual influenza vaccine on 12/23/19.\par 5) EKG taken in office today shows NSR, left axis deviation, no acute changes.\par \par Changes to Medications: Start lisinopril 5mg QD.\par \par EKG taken, results above. Labs were not drawn as labs from one month ago were stable. Patient will return in one month for a follow-up appointment.

## 2020-02-27 ENCOUNTER — LABORATORY RESULT (OUTPATIENT)
Age: 70
End: 2020-02-27

## 2020-02-27 ENCOUNTER — APPOINTMENT (OUTPATIENT)
Dept: NEPHROLOGY | Facility: CLINIC | Age: 70
End: 2020-02-27
Payer: MEDICARE

## 2020-02-27 VITALS — SYSTOLIC BLOOD PRESSURE: 128 MMHG | DIASTOLIC BLOOD PRESSURE: 72 MMHG | HEART RATE: 64 BPM

## 2020-02-27 VITALS — WEIGHT: 174 LBS | HEART RATE: 66 BPM | OXYGEN SATURATION: 98 % | BODY MASS INDEX: 31.83 KG/M2

## 2020-02-27 VITALS — HEART RATE: 67 BPM | DIASTOLIC BLOOD PRESSURE: 74 MMHG | SYSTOLIC BLOOD PRESSURE: 120 MMHG

## 2020-02-27 PROCEDURE — 36415 COLL VENOUS BLD VENIPUNCTURE: CPT

## 2020-02-27 PROCEDURE — 99214 OFFICE O/P EST MOD 30 MIN: CPT | Mod: 25

## 2020-02-27 NOTE — ADDENDUM
[FreeTextEntry1] :  Documented by Julián Angel acting as a scribe for Dr. Jose Ramon Ramires on 02/27/2020.

## 2020-02-27 NOTE — PHYSICAL EXAM
[General Appearance - Alert] : alert [General Appearance - In No Acute Distress] : in no acute distress [Sclera] : the sclera and conjunctiva were normal [Extraocular Movements] : extraocular movements were intact [PERRL With Normal Accommodation] : pupils were equal in size, round, and reactive to light [Outer Ear] : the ears and nose were normal in appearance [Oropharynx] : the oropharynx was normal [Neck Appearance] : the appearance of the neck was normal [Thyroid Diffuse Enlargement] : the thyroid was not enlarged [Thyroid Nodule] : there were no palpable thyroid nodules [Jugular Venous Distention Increased] : there was no jugular-venous distention [Neck Cervical Mass (___cm)] : no neck mass was observed [Auscultation Breath Sounds / Voice Sounds] : lungs were clear to auscultation bilaterally [Heart Rate And Rhythm] : heart rate was normal and rhythm regular [Heart Sounds Gallop] : no gallops [Heart Sounds] : normal S1 and S2 [Heart Sounds Pericardial Friction Rub] : no pericardial rub [Murmurs] : no murmurs [Bowel Sounds] : normal bowel sounds [Edema] : there was no peripheral edema [Abdomen Soft] : soft [Abdomen Tenderness] : non-tender [Abdomen Mass (___ Cm)] : no abdominal mass palpated [] : no hepato-splenomegaly [No CVA Tenderness] : no ~M costovertebral angle tenderness [No Spinal Tenderness] : no spinal tenderness [Musculoskeletal - Swelling] : no joint swelling seen [Abnormal Walk] : normal gait [Nail Clubbing] : no clubbing  or cyanosis of the fingernails [Motor Tone] : muscle strength and tone were normal [Skin Turgor] : normal skin turgor [Skin Color & Pigmentation] : normal skin color and pigmentation [No Focal Deficits] : no focal deficits [Oriented To Time, Place, And Person] : oriented to person, place, and time [Impaired Insight] : insight and judgment were intact [Affect] : the affect was normal

## 2020-02-27 NOTE — HISTORY OF PRESENT ILLNESS
[FreeTextEntry1] : The patient is a 69 year old female presenting for follow-up evaluation and active reassessment of labile HTN, hyperglycemia, HLD, and vitamin D deficiency. \par \par Interval Data:\par - Labs from 12/23/19 reveal: ferritin 175, sed rate 23, triglyceride 268, HDL 45, HbA1C 5.4.\par \par Current Complaints:\par - Patient feels generally well today.\par - She went to dermatologist for rash on her left leg, reportedly received cream for "dry skin" and the rash worsened. She then went to Dr. Gillian Walton who reportedly told patient she had folliculitis and gave abx (unspecified) and skin is now improving.\par - Patient c/o listing to the left. She denies pain.\par \par Current Medications: Crestor 10mg QD, Vitamin D 1000 units QD, lisinopril 5mg QD (started at last visit).\par She is currently on a course of unspecified abx that will be finished in a few days.

## 2020-02-27 NOTE — END OF VISIT
[FreeTextEntry3] : All medical record entries made by the Scribe were at my, Dr. Jose Ramon Ramires, direction and personally dictated by me on 02/27/2020. I have reviewed the chart and agree that the record accurately reflects my personal performance of the history, physical exam, assessment and plan. I have also personally directed, reviewed, and agreed with the chart.

## 2020-02-27 NOTE — ASSESSMENT
[FreeTextEntry1] : Plan:\par 1) HTN: BP acceptable today on current regimen and therefore will not adjust patient's antihypertensive medications. Will reassess pressure and regimen at next evaluation. \par 2) HLD: lipids found to be well-controlled and acceptable on current therapy of Crestor 10mg QD. Due to patient's tolerance of current treatment and acceptable lab results we will not adjust course at this time. Will continue to monitor with lipid profile on blood work today.\par 3) Hyperglycemia: Most recent HbA1C of 5.4 is acceptable at this time. Advised patient to continue lifestyle management with healthy dieting and routine exercise. \par 4) Vitamin D acceptable on recent labs. Advised patient to continue vit D regimen, and will continue to monitor on labs today.\par 5) Imbalance: Due to patient's report of listing to the left. Discussed options of neurology workup with patient, but due to the mildness of the symptom patient declines at this time. We will continue to monitor at future visits.\par \par Changes to Medications: no change\par \par Labs were drawn and patient will return in one month for a follow-up appointment.

## 2020-03-02 LAB
24R-OH-CALCIDIOL SERPL-MCNC: 47.8 PG/ML
25(OH)D3 SERPL-MCNC: 40.3 NG/ML
ALBUMIN MFR SERPL ELPH: 64.4 %
ALBUMIN SERPL ELPH-MCNC: 4.8 G/DL
ALBUMIN SERPL-MCNC: 4.4 G/DL
ALBUMIN/GLOB SERPL: 1.8 RATIO
ALDOSTERONE SERUM: 7.4 NG/DL
ALP BLD-CCNC: 71 U/L
ALP BONE SERPL-MCNC: 13 MCG/L
ALPHA1 GLOB MFR SERPL ELPH: 3.9 %
ALPHA1 GLOB SERPL ELPH-MCNC: 0.3 G/DL
ALPHA2 GLOB MFR SERPL ELPH: 8.5 %
ALPHA2 GLOB SERPL ELPH-MCNC: 0.6 G/DL
ALT SERPL-CCNC: 20 U/L
ANION GAP SERPL CALC-SCNC: 13 MMOL/L
APPEARANCE: CLEAR
AST SERPL-CCNC: 22 U/L
B-GLOBULIN MFR SERPL ELPH: 11.1 %
B-GLOBULIN SERPL ELPH-MCNC: 0.8 G/DL
BACTERIA: NEGATIVE
BASOPHILS # BLD AUTO: 0.04 K/UL
BASOPHILS NFR BLD AUTO: 0.5 %
BILIRUB SERPL-MCNC: 0.6 MG/DL
BILIRUBIN URINE: NEGATIVE
BLOOD URINE: NEGATIVE
BUN SERPL-MCNC: 24 MG/DL
CALCIUM SERPL-MCNC: 10.3 MG/DL
CALCIUM SERPL-MCNC: 10.3 MG/DL
CHLORIDE SERPL-SCNC: 101 MMOL/L
CHOLEST SERPL-MCNC: 129 MG/DL
CHOLEST/HDLC SERPL: 2.5 RATIO
CO2 SERPL-SCNC: 27 MMOL/L
COLOR: NORMAL
CREAT SERPL-MCNC: 0.8 MG/DL
DEPRECATED KAPPA LC FREE/LAMBDA SER: 1.45 RATIO
EOSINOPHIL # BLD AUTO: 0.15 K/UL
EOSINOPHIL NFR BLD AUTO: 1.7 %
ERYTHROCYTE [SEDIMENTATION RATE] IN BLOOD BY WESTERGREN METHOD: 18 MM/HR
ESTIMATED AVERAGE GLUCOSE: 108 MG/DL
FERRITIN SERPL-MCNC: 184 NG/ML
FOLATE SERPL-MCNC: 13.7 NG/ML
GAMMA GLOB FLD ELPH-MCNC: 0.8 G/DL
GAMMA GLOB MFR SERPL ELPH: 12.1 %
GLUCOSE QUALITATIVE U: NEGATIVE
GLUCOSE SERPL-MCNC: 101 MG/DL
HBA1C MFR BLD HPLC: 5.4 %
HCT VFR BLD CALC: 42.9 %
HCYS SERPL-MCNC: 9.7 UMOL/L
HDLC SERPL-MCNC: 51 MG/DL
HGB BLD-MCNC: 14 G/DL
HYALINE CASTS: 0 /LPF
IGA SER QL IEP: 205 MG/DL
IGG SER QL IEP: 729 MG/DL
IGM SER QL IEP: 123 MG/DL
IMM GRANULOCYTES NFR BLD AUTO: 0.3 %
INTERPRETATION SERPL IEP-IMP: NORMAL
IRON SATN MFR SERPL: 29 %
IRON SERPL-MCNC: 87 UG/DL
KAPPA LC CSF-MCNC: 1.74 MG/DL
KAPPA LC SERPL-MCNC: 2.53 MG/DL
KETONES URINE: NEGATIVE
LDLC SERPL CALC-MCNC: 59 MG/DL
LEUKOCYTE ESTERASE URINE: ABNORMAL
LYMPHOCYTES # BLD AUTO: 2.45 K/UL
LYMPHOCYTES NFR BLD AUTO: 27.6 %
M PROTEIN SPEC IFE-MCNC: NORMAL
MAGNESIUM SERPL-MCNC: 2 MG/DL
MAN DIFF?: NORMAL
MCHC RBC-ENTMCNC: 30.5 PG
MCHC RBC-ENTMCNC: 32.6 GM/DL
MCV RBC AUTO: 93.5 FL
MICROSCOPIC-UA: NORMAL
MONOCYTES # BLD AUTO: 0.6 K/UL
MONOCYTES NFR BLD AUTO: 6.8 %
NEUTROPHILS # BLD AUTO: 5.6 K/UL
NEUTROPHILS NFR BLD AUTO: 63.1 %
NITRITE URINE: NEGATIVE
PARATHYROID HORMONE INTACT: 32 PG/ML
PH URINE: 6
PHOSPHATE SERPL-MCNC: 4.4 MG/DL
PLATELET # BLD AUTO: 229 K/UL
POTASSIUM SERPL-SCNC: 4.7 MMOL/L
PROT SERPL-MCNC: 6.9 G/DL
PROTEIN URINE: NEGATIVE
RBC # BLD: 4.59 M/UL
RBC # FLD: 12.6 %
RED BLOOD CELLS URINE: 1 /HPF
RENIN PLASMA: 6.8 PG/ML
SODIUM SERPL-SCNC: 141 MMOL/L
SPECIFIC GRAVITY URINE: 1.01
SQUAMOUS EPITHELIAL CELLS: 1 /HPF
T3FREE SERPL-MCNC: 2.95 PG/ML
T3RU NFR SERPL: 1 TBI
T4 FREE SERPL-MCNC: 1.5 NG/DL
T4 SERPL-MCNC: 8.9 UG/DL
THYROGLOB AB SERPL-ACNC: <20 IU/ML
THYROPEROXIDASE AB SERPL IA-ACNC: <10 IU/ML
TIBC SERPL-MCNC: 301 UG/DL
TRIGL SERPL-MCNC: 97 MG/DL
TSH SERPL-ACNC: 0.94 UIU/ML
UIBC SERPL-MCNC: 214 UG/DL
URATE SERPL-MCNC: 5.1 MG/DL
UROBILINOGEN URINE: NORMAL
VIT B12 SERPL-MCNC: 482 PG/ML
WBC # FLD AUTO: 8.87 K/UL
WHITE BLOOD CELLS URINE: 0 /HPF

## 2020-03-03 LAB — COLLAGEN CTX SERPL-MCNC: 125 PG/ML

## 2020-03-10 LAB — METHYLMALONATE SERPL-SCNC: 169 NMOL/L

## 2020-05-12 ENCOUNTER — APPOINTMENT (OUTPATIENT)
Dept: NEPHROLOGY | Facility: CLINIC | Age: 70
End: 2020-05-12
Payer: MEDICARE

## 2020-05-12 DIAGNOSIS — R09.89 OTHER SPECIFIED SYMPTOMS AND SIGNS INVOLVING THE CIRCULATORY AND RESPIRATORY SYSTEMS: ICD-10-CM

## 2020-05-12 PROCEDURE — 99442: CPT | Mod: CR

## 2020-05-12 NOTE — END OF VISIT
[>50% of the face to face encounter time was spent on counseling and/or coordination of care for ___] : Greater than 50% of the face to face encounter time was spent on counseling and/or coordination of care for [unfilled] [Time Spent: ___ minutes] : I have spent [unfilled] minutes of time on the encounter. [FreeTextEntry3] : All medical record entries made by the Scribe were at my, Dr. Jose Ramon Ramires, direction and personally dictated by me on 05/12/2020. I have reviewed the chart and agree that the record accurately reflects my personal performance of the history, physical exam, assessment and plan. I have also personally directed, reviewed, and agreed with the chart.

## 2020-05-12 NOTE — ADDENDUM
[FreeTextEntry1] :  Documented by Julián Angel acting as a scribe for Dr. Jose Ramon Ramires on 05/12/2020.

## 2020-05-12 NOTE — HISTORY OF PRESENT ILLNESS
[___ Month(s) Ago] : [unfilled] month(s) ago [Primary] : primary hypertension [Diabetes Mellitus] : diabetes mellitus [Hyperlipidemia] : hyperlipidemia [None] : ~He/She~ has no significant interval events [Other Location: e.g. Home (Enter Location, City,State)___] : at [unfilled] [Home] : at home, [unfilled] , at the time of the visit. [Patient] : the patient [Self] : self [Verbal consent obtained from patient] : the patient, [unfilled] [FreeTextEntry1] : Patient wanted to let me know that she had blood tests done and is positive for covid antibody which presumably came from 's illness one month ago. She saw local doctor, Dr. Hester ENT, for throat tickle and and he gave her a course of Augmentin which she is no longer taking. He also checked for covid antibodies which came back positive. Patient still has tickle in her throat and cough.\par \par Labs from 2/27/20 reveal: glucose 101, BUN 24, ferritin 184.\par \par Current Medications: Crestor 10mg QD, Vitamin D 1000 units QD, lisinopril 5mg QD

## 2020-05-12 NOTE — ASSESSMENT
[FreeTextEntry1] : Plan:\par 1) HTN: Due to throat tickle and cough will d/c lisinopril and will start losartan 25mg QD. Will reassess chemistries after this change has occurred.\par 2) HLD: most recent lipid profile stable, continue on current regimen.\par 3) Hyperglycemia: blood sugar stable on current regimen, will recheck with labs.\par 4) Obtain new blood tests at home for both her and her . Have explained to the patient that her reported positive covid antibodies are very likely the result of her 's infection one month ago.\par \par Plan to reconvene with patient in one week via telemedicine.

## 2020-05-13 ENCOUNTER — LABORATORY RESULT (OUTPATIENT)
Age: 70
End: 2020-05-13

## 2020-05-18 ENCOUNTER — APPOINTMENT (OUTPATIENT)
Dept: NEPHROLOGY | Facility: CLINIC | Age: 70
End: 2020-05-18
Payer: MEDICARE

## 2020-05-18 DIAGNOSIS — R05 COUGH: ICD-10-CM

## 2020-05-18 PROCEDURE — 99214 OFFICE O/P EST MOD 30 MIN: CPT | Mod: CS,95

## 2020-05-18 NOTE — PHYSICAL EXAM
[General Appearance - In No Acute Distress] : in no acute distress [General Appearance - Alert] : alert [Extraocular Movements] : extraocular movements were intact [Outer Ear] : the ears and nose were normal in appearance [Neck Appearance] : the appearance of the neck was normal [Respiration, Rhythm And Depth] : normal respiratory rhythm and effort [Exaggerated Use Of Accessory Muscles For Inspiration] : no accessory muscle use [] : no respiratory distress [Edema] : there was no peripheral edema [Involuntary Movements] : no involuntary movements were seen [Skin Color & Pigmentation] : normal skin color and pigmentation [No Focal Deficits] : no focal deficits [Oriented To Time, Place, And Person] : oriented to person, place, and time [Affect] : the affect was normal [Impaired Insight] : insight and judgment were intact [FreeTextEntry1] : no evidence of deformity of discomfort.

## 2020-05-18 NOTE — HISTORY OF PRESENT ILLNESS
[___ Day(s) Ago] : [unfilled] day(s) ago [Primary] : primary hypertension [Diabetes Mellitus] : diabetes mellitus [Hyperlipidemia] : hyperlipidemia [None] : ~He/She~ has no significant interval events [Home] : at home, [unfilled] , at the time of the visit. [Other Location: e.g. Home (Enter Location, City,State)___] : at [unfilled] [Spouse] : spouse [Patient] : the patient [Self] : self [de-identified] : discontinued lisinopril. [FreeTextEntry1] : Patient feels generally well. She used Xyzal and eruption on her face has improved. She has seen a local doctor, Dr. Hester ENT, for ear infection and and he gave her a course of Augmentin which she is no longer taking. He also checked for covid antibodies which reportedly came back positive.\par She reports her BP yesterday at 154/79, pulse 61, and today 158/97, 58. Her cough has resolved since discontinuing lisinopril and she has not started losartan.\par \par Labs from 5/11/20 reveal: WBC 7.2, HGB 13.7, HCT 41.2, platelet 220k, glucose 106, Na 140, K 4.1, Cl 98, CO2 of 27, ferritin 159.\par \par 5/11/20 cardiology evaluation with Dr. Olivo. EKG showed NSR paced at 62, ICRBBB, LAD. Echo taken is also on record. Pt advised to obtain venous Doppler b/o LE to r/o DVT due to patient's LE pain. His note is in the electronic record.\par \par Current Medications: Crestor 10mg QD, Vitamin D 1000 units QD.\par At last visit discontinued lisinopril 5mg QD due to cough.

## 2020-05-18 NOTE — ASSESSMENT
[FreeTextEntry1] : Plan:\par 1) HTN: Patient's cough resolved after discontinuing lisinopril. Will now start losartan 25mg QD and patient will continue to monitor her BP at home. If improvement is not adequate, we will likely increase the dose.\par 2) Patient reportedly has tested positive for covid antibodies. We will repeat this test to confirm antibody status. Also advised patient to obtain a nasal swab as she wishes to be sociable with some of her friends and this will give us more reassurance that she will be safe and keep others safe. Informed patient that if she does see her friends that she should still wear a mask and gloves and maintain social distancing.\par 3) Covid precautions: Informed patient that even if she is covid antibody positive and nasal swab negative, she should continue to keep her distance from her grandchildren, and to continue to avoid public areas. As per her friend who has reportedly tested antibody positive, advised patient to continue to social distance at least six feet away such as on either side of a long table or keeping distancing at an outside visit.\par 4) HLD: Last lipid profile acceptable. Continue current regimen.\par \par Plan to reconvene with patient in 3-4 weeks via telemedicine.

## 2020-05-18 NOTE — END OF VISIT
[Time Spent: ___ minutes] : I have spent [unfilled] minutes of time on the encounter. [>50% of the face to face encounter time was spent on counseling and/or coordination of care for ___] : Greater than 50% of the face to face encounter time was spent on counseling and/or coordination of care for [unfilled] [FreeTextEntry3] : All medical record entries made by the Scribe were at my, Dr. Jose Ramon Ramires, direction and personally dictated by me on 05/18/2020. I have reviewed the chart and agree that the record accurately reflects my personal performance of the history, physical exam, assessment and plan. I have also personally directed, reviewed, and agreed with the chart.

## 2020-05-18 NOTE — ADDENDUM
[FreeTextEntry1] :  Documented by Julián Angel acting as a scribe for Dr. Jose Ramon Ramires on 05/18/2020.

## 2020-05-20 LAB
25(OH)D3 SERPL-MCNC: 42.7 NG/ML
ALBUMIN SERPL ELPH-MCNC: 5 G/DL
ALP BLD-CCNC: 77 U/L
ALT SERPL-CCNC: 24 U/L
ANION GAP SERPL CALC-SCNC: 16 MMOL/L
AST SERPL-CCNC: 25 U/L
BASOPHILS # BLD AUTO: 0.02 K/UL
BASOPHILS NFR BLD AUTO: 0.3 %
BILIRUB SERPL-MCNC: 0.6 MG/DL
BUN SERPL-MCNC: 15 MG/DL
CALCIUM SERPL-MCNC: 10.4 MG/DL
CALCIUM SERPL-MCNC: 10.4 MG/DL
CHLORIDE SERPL-SCNC: 99 MMOL/L
CHOLEST SERPL-MCNC: 222 MG/DL
CHOLEST/HDLC SERPL: 4.2 RATIO
CO2 SERPL-SCNC: 26 MMOL/L
CREAT SERPL-MCNC: 0.6 MG/DL
CRP SERPL-MCNC: 0.75 MG/DL
CYSTATIN C SERPL-MCNC: 0.92 MG/L
EOSINOPHIL # BLD AUTO: 0.08 K/UL
EOSINOPHIL NFR BLD AUTO: 1.1 %
ERYTHROCYTE [SEDIMENTATION RATE] IN BLOOD BY WESTERGREN METHOD: 32 MM/HR
ESTIMATED AVERAGE GLUCOSE: 108 MG/DL
FERRITIN SERPL-MCNC: 170 NG/ML
FOLATE SERPL-MCNC: 15.3 NG/ML
GFR/BSA.PRED SERPLBLD CYS-BASED-ARV: 78 ML/MIN
GLUCOSE SERPL-MCNC: 118 MG/DL
HBA1C MFR BLD HPLC: 5.4 %
HCT VFR BLD CALC: 45.3 %
HCYS SERPL-MCNC: 10.1 UMOL/L
HDLC SERPL-MCNC: 52 MG/DL
HGB BLD-MCNC: 14.2 G/DL
IMM GRANULOCYTES NFR BLD AUTO: 0.1 %
IRON SATN MFR SERPL: 32 %
IRON SERPL-MCNC: 100 UG/DL
LDLC SERPL CALC-MCNC: 142 MG/DL
LYMPHOCYTES # BLD AUTO: 2.33 K/UL
LYMPHOCYTES NFR BLD AUTO: 31.2 %
MAGNESIUM SERPL-MCNC: 2 MG/DL
MAN DIFF?: NORMAL
MCHC RBC-ENTMCNC: 29.9 PG
MCHC RBC-ENTMCNC: 31.3 GM/DL
MCV RBC AUTO: 95.4 FL
MONOCYTES # BLD AUTO: 0.48 K/UL
MONOCYTES NFR BLD AUTO: 6.4 %
NEUTROPHILS # BLD AUTO: 4.54 K/UL
NEUTROPHILS NFR BLD AUTO: 60.9 %
NT-PROBNP SERPL-MCNC: 115 PG/ML
PARATHYROID HORMONE INTACT: 15 PG/ML
PHOSPHATE SERPL-MCNC: 3.9 MG/DL
PLATELET # BLD AUTO: 226 K/UL
POTASSIUM SERPL-SCNC: 4.5 MMOL/L
PROT SERPL-MCNC: 7.8 G/DL
RBC # BLD: 4.75 M/UL
RBC # FLD: 12.7 %
RHEUMATOID FACT SER QL: <10 IU/ML
SARS-COV-2 IGG SERPL IA-ACNC: 7.4 INDEX
SARS-COV-2 IGG SERPL QL IA: POSITIVE
SODIUM SERPL-SCNC: 141 MMOL/L
T3FREE SERPL-MCNC: 3.28 PG/ML
T3RU NFR SERPL: 0.9 TBI
T4 FREE SERPL-MCNC: 1.3 NG/DL
T4 SERPL-MCNC: 11.7 UG/DL
TIBC SERPL-MCNC: 314 UG/DL
TRIGL SERPL-MCNC: 138 MG/DL
TSH SERPL-ACNC: 1.32 UIU/ML
UIBC SERPL-MCNC: 214 UG/DL
URATE SERPL-MCNC: 4.2 MG/DL
VIT B12 SERPL-MCNC: 515 PG/ML
WBC # FLD AUTO: 7.46 K/UL

## 2020-05-22 LAB
24R-OH-CALCIDIOL SERPL-MCNC: 51.3 PG/ML
ALBUMIN MFR SERPL ELPH: 62.8 %
ALBUMIN SERPL-MCNC: 4.9 G/DL
ALBUMIN/GLOB SERPL: 1.7 RATIO
ALDOSTERONE SERUM: 11.2 NG/DL
ALP BONE SERPL-MCNC: 12 MCG/L
ALPHA1 GLOB MFR SERPL ELPH: 3.9 %
ALPHA1 GLOB SERPL ELPH-MCNC: 0.3 G/DL
ALPHA2 GLOB MFR SERPL ELPH: 9.7 %
ALPHA2 GLOB SERPL ELPH-MCNC: 0.8 G/DL
ANA SER IF-ACNC: NEGATIVE
B-GLOBULIN MFR SERPL ELPH: 11.5 %
B-GLOBULIN SERPL ELPH-MCNC: 0.9 G/DL
C3 SERPL-MCNC: 160 MG/DL
C4 SERPL-MCNC: 48 MG/DL
DEPRECATED KAPPA LC FREE/LAMBDA SER: 1.46 RATIO
GAMMA GLOB FLD ELPH-MCNC: 0.9 G/DL
GAMMA GLOB MFR SERPL ELPH: 12.1 %
HBV SURFACE AB SER QL: NONREACTIVE
HBV SURFACE AG SER QL: NONREACTIVE
HCV AB SER QL: NONREACTIVE
HCV S/CO RATIO: 0.2 S/CO
IGA SER QL IEP: 212 MG/DL
IGG SER QL IEP: 902 MG/DL
IGM SER QL IEP: 153 MG/DL
INTERPRETATION SERPL IEP-IMP: NORMAL
KAPPA LC CSF-MCNC: 1.8 MG/DL
KAPPA LC SERPL-MCNC: 2.62 MG/DL
M PROTEIN SPEC IFE-MCNC: NORMAL
MPO AB + PR3 PNL SER: NORMAL
PROT SERPL-MCNC: 7.8 G/DL
PROT SERPL-MCNC: 7.8 G/DL
THYROGLOB AB SERPL-ACNC: <20 IU/ML
THYROPEROXIDASE AB SERPL IA-ACNC: <10 IU/ML

## 2020-05-28 LAB
COLLAGEN CTX SERPL-MCNC: 85 PG/ML
METHYLMALONATE SERPL-SCNC: 243 NMOL/L

## 2020-06-04 ENCOUNTER — APPOINTMENT (OUTPATIENT)
Dept: NEPHROLOGY | Facility: CLINIC | Age: 70
End: 2020-06-04
Payer: MEDICARE

## 2020-06-04 PROCEDURE — 99214 OFFICE O/P EST MOD 30 MIN: CPT | Mod: CS,95

## 2020-06-04 NOTE — HISTORY OF PRESENT ILLNESS
[___ Week(s) Ago] : [unfilled] week(s) ago [Primary] : primary hypertension [Hyperlipidemia] : hyperlipidemia [Diabetes Mellitus] : diabetes mellitus [None] : ~He/She~ has no significant interval events [FreeTextEntry1] : Patient is feeling well. She is in [facility] where she reports it is near impossible to social distance on the streets in her area. She is positive for covid antibodies. She has still not had a nasal swab. \par She has began taking losartan 25mg QD as advised. Her BP on losartan has been: 138/80, 128/72, 120/74, 129/74. She is tolerating well and no longer has a cough.\par \par Labs from 5/13/20 reveal: WBC 7.2, HGB 13.7, HCT 41.2, platelet 220k, glucose 106, Na 140, K 4.1, Cl 98, CO2 of 27, ferritin 159. Patient is positive for covid-19 antibodies.\par \par 5/11/20 cardiology evaluation with Dr. Olivo. EKG showed NSR paced at 62, ICRBBB, LAD. Echo taken is also on record. Pt advised to obtain venous Doppler b/o LE to r/o DVT due to patient's LE pain. His note is in the electronic record.\par \par Current Medications: Crestor 10mg QD, Vitamin D 1000 units QD, losartan 25mg QD.

## 2020-06-04 NOTE — END OF VISIT
[Time Spent: ___ minutes] : I have spent [unfilled] minutes of time on the encounter. [>50% of the face to face encounter time was spent on counseling and/or coordination of care for ___] : Greater than 50% of the face to face encounter time was spent on counseling and/or coordination of care for [unfilled] [FreeTextEntry3] : All medical record entries made by the Scribe were at my, Dr. Jose Ramon Ramires, direction and personally dictated by me on 06/04/2020. I have reviewed the chart and agree that the record accurately reflects my personal performance of the history, physical exam, assessment and plan. I have also personally directed, reviewed, and agreed with the chart.

## 2020-06-04 NOTE — PHYSICAL EXAM
[General Appearance - Alert] : alert [General Appearance - In No Acute Distress] : in no acute distress [Extraocular Movements] : extraocular movements were intact [Outer Ear] : the ears and nose were normal in appearance [] : no respiratory distress [Neck Appearance] : the appearance of the neck was normal [Respiration, Rhythm And Depth] : normal respiratory rhythm and effort [Exaggerated Use Of Accessory Muscles For Inspiration] : no accessory muscle use [Edema] : there was no peripheral edema [Involuntary Movements] : no involuntary movements were seen [Skin Color & Pigmentation] : normal skin color and pigmentation [No Focal Deficits] : no focal deficits [Impaired Insight] : insight and judgment were intact [Oriented To Time, Place, And Person] : oriented to person, place, and time [Affect] : the affect was normal [FreeTextEntry1] : no evidence of deformity or discomfort.

## 2020-06-04 NOTE — ASSESSMENT
[FreeTextEntry1] : Plan:\par 1) HTN: Patient's BP is reportedly stable at home, she is tolerating losartan and no longer has cough since discontinuing lisinopril. Continue on current antihypertensive regimen. \par 2) HLD: Last lipid profile was stable for patient. Continue on current regimen. \par 3) Patient is positive for covid antibodies, as is her . Advised patient to now obtain a nasal swab to r/o a current covid infection.\par 4) Advised patient to continue covid precautions throughout the duration of the covid pandemic. Advised patient that since her and her  have antibodies that they can go outside and into stores, but that they should continue to wear masks and maintain social distancing.\par \par Plan to reconvene with patient in late June or early July in the office.

## 2020-06-04 NOTE — ADDENDUM
[FreeTextEntry1] :  Documented by Julián Angel acting as a scribe for Dr. Jose Ramon Ramires on 06/04/2020.

## 2020-07-27 ENCOUNTER — LABORATORY RESULT (OUTPATIENT)
Age: 70
End: 2020-07-27

## 2020-07-27 ENCOUNTER — APPOINTMENT (OUTPATIENT)
Dept: NEPHROLOGY | Facility: CLINIC | Age: 70
End: 2020-07-27
Payer: MEDICARE

## 2020-07-27 VITALS — SYSTOLIC BLOOD PRESSURE: 150 MMHG | DIASTOLIC BLOOD PRESSURE: 88 MMHG

## 2020-07-27 VITALS — SYSTOLIC BLOOD PRESSURE: 156 MMHG | DIASTOLIC BLOOD PRESSURE: 79 MMHG | HEART RATE: 67 BPM

## 2020-07-27 PROCEDURE — 36415 COLL VENOUS BLD VENIPUNCTURE: CPT

## 2020-07-27 PROCEDURE — 99214 OFFICE O/P EST MOD 30 MIN: CPT | Mod: 25

## 2020-07-27 NOTE — PHYSICAL EXAM
[General Appearance - Alert] : alert [General Appearance - In No Acute Distress] : in no acute distress [Sclera] : the sclera and conjunctiva were normal [PERRL With Normal Accommodation] : pupils were equal in size, round, and reactive to light [Extraocular Movements] : extraocular movements were intact [Oropharynx] : the oropharynx was normal [Outer Ear] : the ears and nose were normal in appearance [Neck Appearance] : the appearance of the neck was normal [Neck Cervical Mass (___cm)] : no neck mass was observed [Jugular Venous Distention Increased] : there was no jugular-venous distention [Thyroid Nodule] : there were no palpable thyroid nodules [Thyroid Diffuse Enlargement] : the thyroid was not enlarged [Auscultation Breath Sounds / Voice Sounds] : lungs were clear to auscultation bilaterally [Heart Rate And Rhythm] : heart rate was normal and rhythm regular [Heart Sounds] : normal S1 and S2 [Heart Sounds Pericardial Friction Rub] : no pericardial rub [Heart Sounds Gallop] : no gallops [Murmurs] : no murmurs [Bowel Sounds] : normal bowel sounds [Edema] : there was no peripheral edema [Abdomen Soft] : soft [Abdomen Tenderness] : non-tender [Abdomen Mass (___ Cm)] : no abdominal mass palpated [No CVA Tenderness] : no ~M costovertebral angle tenderness [No Spinal Tenderness] : no spinal tenderness [Nail Clubbing] : no clubbing  or cyanosis of the fingernails [Abnormal Walk] : normal gait [Motor Tone] : muscle strength and tone were normal [Musculoskeletal - Swelling] : no joint swelling seen [Skin Color & Pigmentation] : normal skin color and pigmentation [] : no rash [Skin Turgor] : normal skin turgor [No Focal Deficits] : no focal deficits [Sensation] : the sensory exam was normal to light touch and pinprick [Oriented To Time, Place, And Person] : oriented to person, place, and time [Impaired Insight] : insight and judgment were intact [Affect] : the affect was normal

## 2020-07-27 NOTE — ADDENDUM
[FreeTextEntry1] : All medical record entries made by the Scribe were at my, Dr. Jose Ramon Ramires, direction and personally dictated by me on 07/27/2020. I have reviewed the chart and agree that the record accurately reflects my personal performance of the history, physical exam, assessment and plan. I have also personally directed, reviewed, and agreed with the chart.

## 2020-07-27 NOTE — ASSESSMENT
[FreeTextEntry1] : Plan:\par 1) HTN: BP is elevated in office today. Will increase losartan to 50mg QD and will reassess pressure and regimen at next evaluation.\par 2) HLD: Last lipid profile was stable for patient. Continue on current regimen. \par 3) Patient is positive for COVID antibodies. Advised patient to continue COVID precautions throughout the duration of the COVID pandemic, including avoidance of crowded indoor spaces if possible. 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

## 2020-07-27 NOTE — END OF VISIT
[Time Spent: ___ minutes] : I have spent [unfilled] minutes of time on the encounter. [>50% of the face to face encounter time was spent on counseling and/or coordination of care for ___] : Greater than 50% of the face to face encounter time was spent on counseling and/or coordination of care for [unfilled] [FreeTextEntry3] : Documented by Preston Mosqueda acting as a scribe for Dr. Jose Ramon Raimres on 07/27/2020.

## 2020-07-27 NOTE — HISTORY OF PRESENT ILLNESS
[FreeTextEntry1] : The patient is a 69 year old female presenting today for follow-up evaluation of HTN, DM, and HLD.\par \par The patient is feeling generally well today and denies any new medical concerns. 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 Patient is positive for COVID antibodies.\par \par 5/11/20 cardiology evaluation with Dr. Piedra. EKG showed NSR paced at 62, ICRBBB, LAD. Echo taken is also on record. Pt advised to obtain venous Doppler b/o LE to r/o DVT due to patient's LE pain. His note is in the electronic record.\par \par Current Medications: Crestor 10mg QD, Vitamin D 1000 units QD, losartan 25mg QD

## 2020-07-30 LAB
24R-OH-CALCIDIOL SERPL-MCNC: 66.2 PG/ML
25(OH)D3 SERPL-MCNC: 36.2 NG/ML
ALBUMIN MFR SERPL ELPH: 61 %
ALBUMIN SERPL ELPH-MCNC: 4.9 G/DL
ALBUMIN SERPL-MCNC: 4.2 G/DL
ALBUMIN/GLOB SERPL: 1.6 RATIO
ALDOSTERONE SERUM: 8.8 NG/DL
ALP BLD-CCNC: 71 U/L
ALP BONE SERPL-MCNC: 12 MCG/L
ALPHA1 GLOB MFR SERPL ELPH: 3.8 %
ALPHA1 GLOB SERPL ELPH-MCNC: 0.3 G/DL
ALPHA2 GLOB MFR SERPL ELPH: 9.7 %
ALPHA2 GLOB SERPL ELPH-MCNC: 0.7 G/DL
ALT SERPL-CCNC: 16 U/L
ANA SER IF-ACNC: NEGATIVE
ANION GAP SERPL CALC-SCNC: 16 MMOL/L
APPEARANCE: ABNORMAL
AST SERPL-CCNC: 20 U/L
B-GLOBULIN MFR SERPL ELPH: 12.2 %
B-GLOBULIN SERPL ELPH-MCNC: 0.8 G/DL
BACTERIA: ABNORMAL
BASOPHILS # BLD AUTO: 0.03 K/UL
BASOPHILS NFR BLD AUTO: 0.5 %
BILIRUB SERPL-MCNC: 0.6 MG/DL
BILIRUBIN URINE: NEGATIVE
BLOOD URINE: NEGATIVE
BUN SERPL-MCNC: 15 MG/DL
C3 SERPL-MCNC: 113 MG/DL
C4 SERPL-MCNC: 37 MG/DL
CALCIUM SERPL-MCNC: 10 MG/DL
CALCIUM SERPL-MCNC: 10 MG/DL
CHLORIDE SERPL-SCNC: 103 MMOL/L
CHOLEST SERPL-MCNC: 154 MG/DL
CHOLEST/HDLC SERPL: 3.1 RATIO
CO2 SERPL-SCNC: 22 MMOL/L
COLOR: NORMAL
CREAT SERPL-MCNC: 0.64 MG/DL
CRP SERPL-MCNC: 0.26 MG/DL
DEPRECATED KAPPA LC FREE/LAMBDA SER: 1.63 RATIO
EOSINOPHIL # BLD AUTO: 0.05 K/UL
EOSINOPHIL NFR BLD AUTO: 0.8 %
ERYTHROCYTE [SEDIMENTATION RATE] IN BLOOD BY WESTERGREN METHOD: 27 MM/HR
ESTIMATED AVERAGE GLUCOSE: 111 MG/DL
FERRITIN SERPL-MCNC: 145 NG/ML
FOLATE SERPL-MCNC: 15.2 NG/ML
GAMMA GLOB FLD ELPH-MCNC: 0.9 G/DL
GAMMA GLOB MFR SERPL ELPH: 13.3 %
GLUCOSE QUALITATIVE U: NEGATIVE
GLUCOSE SERPL-MCNC: 100 MG/DL
HBA1C MFR BLD HPLC: 5.5 %
HCT VFR BLD CALC: 41.3 %
HCYS SERPL-MCNC: 8.5 UMOL/L
HDLC SERPL-MCNC: 50 MG/DL
HGB BLD-MCNC: 13.6 G/DL
HYALINE CASTS: 1 /LPF
IGA SER QL IEP: 190 MG/DL
IGG SER QL IEP: 792 MG/DL
IGM SER QL IEP: 124 MG/DL
IMM GRANULOCYTES NFR BLD AUTO: 0.3 %
INTERPRETATION SERPL IEP-IMP: NORMAL
IRON SATN MFR SERPL: 34 %
IRON SERPL-MCNC: 104 UG/DL
KAPPA LC CSF-MCNC: 1.39 MG/DL
KAPPA LC SERPL-MCNC: 2.27 MG/DL
KETONES URINE: NEGATIVE
LDLC SERPL CALC-MCNC: 78 MG/DL
LEUKOCYTE ESTERASE URINE: ABNORMAL
LYMPHOCYTES # BLD AUTO: 2.1 K/UL
LYMPHOCYTES NFR BLD AUTO: 32.2 %
M PROTEIN SPEC IFE-MCNC: NORMAL
MAGNESIUM SERPL-MCNC: 2 MG/DL
MAN DIFF?: NORMAL
MCHC RBC-ENTMCNC: 30.5 PG
MCHC RBC-ENTMCNC: 32.9 GM/DL
MCV RBC AUTO: 92.6 FL
MICROSCOPIC-UA: NORMAL
MONOCYTES # BLD AUTO: 0.45 K/UL
MONOCYTES NFR BLD AUTO: 6.9 %
MPO AB + PR3 PNL SER: NORMAL
NEUTROPHILS # BLD AUTO: 3.87 K/UL
NEUTROPHILS NFR BLD AUTO: 59.3 %
NITRITE URINE: NEGATIVE
PARATHYROID HORMONE INTACT: 38 PG/ML
PH URINE: 6
PHOSPHATE SERPL-MCNC: 3.7 MG/DL
PLATELET # BLD AUTO: 189 K/UL
POTASSIUM SERPL-SCNC: 4.1 MMOL/L
PROT SERPL-MCNC: 6.9 G/DL
PROTEIN URINE: NEGATIVE
RBC # BLD: 4.46 M/UL
RBC # FLD: 12.9 %
RED BLOOD CELLS URINE: 1 /HPF
RENIN PLASMA: 2.1 PG/ML
RHEUMATOID FACT SER QL: <10 IU/ML
SARS-COV-2 IGG SERPL IA-ACNC: 7.56 AU/ML
SARS-COV-2 IGG SERPL QL IA: NEGATIVE
SODIUM SERPL-SCNC: 141 MMOL/L
SPECIFIC GRAVITY URINE: 1.01
SQUAMOUS EPITHELIAL CELLS: 3 /HPF
T3FREE SERPL-MCNC: 3.12 PG/ML
T3RU NFR SERPL: 1.1 TBI
T4 FREE SERPL-MCNC: 1.2 NG/DL
T4 SERPL-MCNC: 8 UG/DL
THYROGLOB AB SERPL-ACNC: <20 IU/ML
THYROPEROXIDASE AB SERPL IA-ACNC: <10 IU/ML
TIBC SERPL-MCNC: 308 UG/DL
TRIGL SERPL-MCNC: 127 MG/DL
TSH SERPL-ACNC: 0.92 UIU/ML
UIBC SERPL-MCNC: 204 UG/DL
URATE SERPL-MCNC: 3.9 MG/DL
UROBILINOGEN URINE: NORMAL
VIT B12 SERPL-MCNC: 432 PG/ML
WBC # FLD AUTO: 6.52 K/UL
WHITE BLOOD CELLS URINE: 25 /HPF

## 2020-07-31 LAB — METHYLMALONATE SERPL-SCNC: 135 NMOL/L

## 2020-08-02 LAB — COLLAGEN CTX SERPL-MCNC: 160 PG/ML

## 2020-09-14 ENCOUNTER — APPOINTMENT (OUTPATIENT)
Dept: NEPHROLOGY | Facility: CLINIC | Age: 70
End: 2020-09-14

## 2020-09-21 ENCOUNTER — RX RENEWAL (OUTPATIENT)
Age: 70
End: 2020-09-21

## 2020-09-22 ENCOUNTER — RX RENEWAL (OUTPATIENT)
Age: 70
End: 2020-09-22

## 2020-10-12 ENCOUNTER — LABORATORY RESULT (OUTPATIENT)
Age: 70
End: 2020-10-12

## 2020-10-12 ENCOUNTER — APPOINTMENT (OUTPATIENT)
Dept: NEPHROLOGY | Facility: CLINIC | Age: 70
End: 2020-10-12
Payer: MEDICARE

## 2020-10-12 VITALS
OXYGEN SATURATION: 97 % | WEIGHT: 182.4 LBS | HEART RATE: 73 BPM | BODY MASS INDEX: 33.36 KG/M2 | SYSTOLIC BLOOD PRESSURE: 152 MMHG | DIASTOLIC BLOOD PRESSURE: 78 MMHG

## 2020-10-12 VITALS — SYSTOLIC BLOOD PRESSURE: 120 MMHG | HEART RATE: 72 BPM | DIASTOLIC BLOOD PRESSURE: 80 MMHG

## 2020-10-12 VITALS — SYSTOLIC BLOOD PRESSURE: 120 MMHG | HEART RATE: 60 BPM | DIASTOLIC BLOOD PRESSURE: 80 MMHG

## 2020-10-12 VITALS — DIASTOLIC BLOOD PRESSURE: 76 MMHG | HEART RATE: 72 BPM | SYSTOLIC BLOOD PRESSURE: 122 MMHG

## 2020-10-12 PROCEDURE — 99214 OFFICE O/P EST MOD 30 MIN: CPT | Mod: 25

## 2020-10-12 PROCEDURE — G0008: CPT

## 2020-10-12 PROCEDURE — 90662 IIV NO PRSV INCREASED AG IM: CPT

## 2020-10-12 NOTE — PHYSICAL EXAM
[General Appearance - Alert] : alert [General Appearance - In No Acute Distress] : in no acute distress [Sclera] : the sclera and conjunctiva were normal [PERRL With Normal Accommodation] : pupils were equal in size, round, and reactive to light [Extraocular Movements] : extraocular movements were intact [Outer Ear] : the ears and nose were normal in appearance [Hearing Threshold Finger Rub Not [place]] : hearing was normal [Examination Of The Oral Cavity] : the lips and gums were normal [Neck Appearance] : the appearance of the neck was normal [Neck Cervical Mass (___cm)] : no neck mass was observed [Jugular Venous Distention Increased] : there was no jugular-venous distention [Thyroid Diffuse Enlargement] : the thyroid was not enlarged [Thyroid Nodule] : there were no palpable thyroid nodules [Auscultation Breath Sounds / Voice Sounds] : lungs were clear to auscultation bilaterally [Heart Rate And Rhythm] : heart rate was normal and rhythm regular [Heart Sounds] : normal S1 and S2 [Heart Sounds Gallop] : no gallops [Murmurs] : no murmurs [Heart Sounds Pericardial Friction Rub] : no pericardial rub [Edema] : there was no peripheral edema [Bowel Sounds] : normal bowel sounds [Abdomen Soft] : soft [Abdomen Tenderness] : non-tender [Abdomen Mass (___ Cm)] : no abdominal mass palpated [No CVA Tenderness] : no ~M costovertebral angle tenderness [No Spinal Tenderness] : no spinal tenderness [Abnormal Walk] : normal gait [Involuntary Movements] : no involuntary movements were seen [Musculoskeletal - Swelling] : no joint swelling seen [Motor Tone] : muscle strength and tone were normal [Skin Color & Pigmentation] : normal skin color and pigmentation [Skin Turgor] : normal skin turgor [] : no rash [No Focal Deficits] : no focal deficits [Oriented To Time, Place, And Person] : oriented to person, place, and time [Impaired Insight] : insight and judgment were intact [Affect] : the affect was normal

## 2020-10-12 NOTE — ASSESSMENT
[FreeTextEntry1] : Plan:\par 1) HTN: BP acceptable today on current regimen. Will therefore not adjust patient's antihypertensive medications at this time but will reassess pressure and regimen at next evaluation.\par 2) HLD: patient on current therapy of rosuvastatin 10mg QD. Will continue to monitor with lipid profile on blood work today.\par 3) At length, I advised the patient to continue strict adherence to COVID precautions throughout the duration of the COVID pandemic, including only attending outdoor activities with proper physical distancing and mask use when traveling into public areas. Informed patient that outdoor activities are of inherently lower risk for COVID transmission as compared to indoor functions. Patient has had disparate COVID antibody results (negative 7/27, positive 5/13); will repeat COVID antibody test today.\par 4) Administered senior influenza vaccine subcutaneously into right deltoid without incident.\par \par Changes to medications: None\par \par Patient had extensive labs drawn at outside laboratory. She will return in 2 months for a follow-up appointment.

## 2020-10-12 NOTE — END OF VISIT
[Time Spent: ___ minutes] : I have spent [unfilled] minutes of time on the encounter. [>50% of the face to face encounter time was spent on counseling and/or coordination of care for ___] : Greater than 50% of the face to face encounter time was spent on counseling and/or coordination of care for [unfilled] [FreeTextEntry3] : Documented by Preston Mosqueda acting as a scribe for Dr. Jose Ramon Ramires on 10/12/2020.

## 2020-10-12 NOTE — ADDENDUM
[FreeTextEntry1] : All medical record entries made by the Scribe were at my, Dr. Jose Ramon Ramires, direction and personally dictated by me on 10/12/2020. I have reviewed the chart and agree that the record accurately reflects my personal performance of the history, physical exam, assessment and plan. I have also personally directed, reviewed, and agreed with the chart.

## 2020-10-14 LAB
24R-OH-CALCIDIOL SERPL-MCNC: 51.4 PG/ML
25(OH)D3 SERPL-MCNC: 36.4 NG/ML
ALBUMIN MFR SERPL ELPH: 63.8 %
ALBUMIN SERPL ELPH-MCNC: 4.9 G/DL
ALBUMIN SERPL-MCNC: 4.5 G/DL
ALBUMIN/GLOB SERPL: 1.8 RATIO
ALDOSTERONE SERUM: 9.2 NG/DL
ALP BLD-CCNC: 72 U/L
ALPHA1 GLOB MFR SERPL ELPH: 3.6 %
ALPHA1 GLOB SERPL ELPH-MCNC: 0.3 G/DL
ALPHA2 GLOB MFR SERPL ELPH: 8.9 %
ALPHA2 GLOB SERPL ELPH-MCNC: 0.6 G/DL
ALT SERPL-CCNC: 16 U/L
ANA SER IF-ACNC: NEGATIVE
ANION GAP SERPL CALC-SCNC: 14 MMOL/L
APPEARANCE: ABNORMAL
AST SERPL-CCNC: 20 U/L
B-GLOBULIN MFR SERPL ELPH: 11.1 %
B-GLOBULIN SERPL ELPH-MCNC: 0.8 G/DL
BACTERIA: NEGATIVE
BASOPHILS # BLD AUTO: 0.04 K/UL
BASOPHILS NFR BLD AUTO: 0.6 %
BILIRUB SERPL-MCNC: 0.7 MG/DL
BILIRUBIN URINE: NEGATIVE
BLOOD URINE: NEGATIVE
BUN SERPL-MCNC: 17 MG/DL
C3 SERPL-MCNC: 124 MG/DL
C4 SERPL-MCNC: 40 MG/DL
CALCIUM SERPL-MCNC: 10.4 MG/DL
CALCIUM SERPL-MCNC: 10.4 MG/DL
CHLORIDE SERPL-SCNC: 104 MMOL/L
CHOLEST SERPL-MCNC: 164 MG/DL
CHOLEST/HDLC SERPL: 3.6 RATIO
CO2 SERPL-SCNC: 26 MMOL/L
COLOR: NORMAL
CREAT SERPL-MCNC: 0.68 MG/DL
CRP SERPL-MCNC: 0.33 MG/DL
DEPRECATED KAPPA LC FREE/LAMBDA SER: 1.73 RATIO
DEPRECATED KAPPA LC FREE/LAMBDA SER: 1.73 RATIO
EOSINOPHIL # BLD AUTO: 0.05 K/UL
EOSINOPHIL NFR BLD AUTO: 0.7 %
ERYTHROCYTE [SEDIMENTATION RATE] IN BLOOD BY WESTERGREN METHOD: 8 MM/HR
ESTIMATED AVERAGE GLUCOSE: 111 MG/DL
FERRITIN SERPL-MCNC: 169 NG/ML
FOLATE SERPL-MCNC: 13.2 NG/ML
GAMMA GLOB FLD ELPH-MCNC: 0.9 G/DL
GAMMA GLOB MFR SERPL ELPH: 12.6 %
GLUCOSE QUALITATIVE U: NEGATIVE
GLUCOSE SERPL-MCNC: 96 MG/DL
HBA1C MFR BLD HPLC: 5.5 %
HBV SURFACE AB SER QL: NONREACTIVE
HBV SURFACE AG SER QL: NONREACTIVE
HCT VFR BLD CALC: 43.5 %
HCV AB SER QL: NONREACTIVE
HCV S/CO RATIO: 0.1 S/CO
HCYS SERPL-MCNC: 9.7 UMOL/L
HDLC SERPL-MCNC: 45 MG/DL
HGB BLD-MCNC: 14 G/DL
HYALINE CASTS: 0 /LPF
IGA SER QL IEP: 213 MG/DL
IGG SER QL IEP: 837 MG/DL
IGM SER QL IEP: 126 MG/DL
IMM GRANULOCYTES NFR BLD AUTO: 0.1 %
INTERPRETATION SERPL IEP-IMP: NORMAL
IRON SATN MFR SERPL: 40 %
IRON SERPL-MCNC: 125 UG/DL
KAPPA LC CSF-MCNC: 1.77 MG/DL
KAPPA LC CSF-MCNC: 1.77 MG/DL
KAPPA LC SERPL-MCNC: 3.06 MG/DL
KAPPA LC SERPL-MCNC: 3.06 MG/DL
KETONES URINE: NEGATIVE
LDLC SERPL CALC-MCNC: 86 MG/DL
LEUKOCYTE ESTERASE URINE: ABNORMAL
LYMPHOCYTES # BLD AUTO: 2 K/UL
LYMPHOCYTES NFR BLD AUTO: 29.4 %
M PROTEIN SPEC IFE-MCNC: NORMAL
MAGNESIUM SERPL-MCNC: 2.1 MG/DL
MAN DIFF?: NORMAL
MCHC RBC-ENTMCNC: 30.6 PG
MCHC RBC-ENTMCNC: 32.2 GM/DL
MCV RBC AUTO: 95 FL
MICROSCOPIC-UA: NORMAL
MONOCYTES # BLD AUTO: 0.42 K/UL
MONOCYTES NFR BLD AUTO: 6.2 %
MPO AB + PR3 PNL SER: NORMAL
NEUTROPHILS # BLD AUTO: 4.28 K/UL
NEUTROPHILS NFR BLD AUTO: 63 %
NITRITE URINE: NEGATIVE
PARATHYROID HORMONE INTACT: 30 PG/ML
PH URINE: 7.5
PHOSPHATE SERPL-MCNC: 3.7 MG/DL
PLATELET # BLD AUTO: 214 K/UL
POTASSIUM SERPL-SCNC: 4.7 MMOL/L
PROT SERPL-MCNC: 7 G/DL
PROTEIN URINE: NEGATIVE
RBC # BLD: 4.58 M/UL
RBC # FLD: 12.8 %
RED BLOOD CELLS URINE: 1 /HPF
RENIN PLASMA: 2.7 PG/ML
RHEUMATOID FACT SER QL: <10 IU/ML
SARS-COV-2 IGG SERPL IA-ACNC: 7.92 INDEX
SARS-COV-2 IGG SERPL QL IA: POSITIVE
SODIUM SERPL-SCNC: 144 MMOL/L
SPECIFIC GRAVITY URINE: 1.01
SQUAMOUS EPITHELIAL CELLS: 25 /HPF
T3FREE SERPL-MCNC: 3 PG/ML
T3RU NFR SERPL: 1 TBI
T4 FREE SERPL-MCNC: 1.2 NG/DL
T4 SERPL-MCNC: 8.3 UG/DL
THYROGLOB AB SERPL-ACNC: <20 IU/ML
THYROPEROXIDASE AB SERPL IA-ACNC: <10 IU/ML
TIBC SERPL-MCNC: 310 UG/DL
TRIGL SERPL-MCNC: 162 MG/DL
TSH SERPL-ACNC: 1.54 UIU/ML
UIBC SERPL-MCNC: 185 UG/DL
URATE SERPL-MCNC: 4 MG/DL
URINE COMMENTS: NORMAL
UROBILINOGEN URINE: NORMAL
VIT B12 SERPL-MCNC: 487 PG/ML
WBC # FLD AUTO: 6.8 K/UL
WHITE BLOOD CELLS URINE: 15 /HPF

## 2020-10-16 LAB
ALP BONE SERPL-MCNC: 11 MCG/L
C1Q IMMUNE COMPLEX: <1.2 UG EQ/ML
C3D IMMUNE COMPLEXES: 3.6 UG EQ/ML
COLLAGEN CTX SERPL-MCNC: 145 PG/ML
KAPPA TOTAL LIGHT CHAIN, URINE: <0.9 MG/DL
KAPPA/LAMBDA TOTAL LIGHT CHAIN RATIO, URINE: NORMAL
LAMBDA TOTAL LIGHT CHAIN, URINE: <0.7 MG/DL

## 2020-10-18 LAB — METHYLMALONATE SERPL-SCNC: 157 NMOL/L

## 2020-12-21 PROBLEM — Z87.09 HISTORY OF ACUTE SINUSITIS: Status: RESOLVED | Noted: 2019-04-10 | Resolved: 2020-12-21

## 2021-01-11 ENCOUNTER — APPOINTMENT (OUTPATIENT)
Dept: NEPHROLOGY | Facility: CLINIC | Age: 71
End: 2021-01-11
Payer: MEDICARE

## 2021-01-11 ENCOUNTER — LABORATORY RESULT (OUTPATIENT)
Age: 71
End: 2021-01-11

## 2021-01-11 VITALS — HEART RATE: 70 BPM | SYSTOLIC BLOOD PRESSURE: 141 MMHG | DIASTOLIC BLOOD PRESSURE: 80 MMHG

## 2021-01-11 VITALS — HEART RATE: 60 BPM | DIASTOLIC BLOOD PRESSURE: 80 MMHG | SYSTOLIC BLOOD PRESSURE: 143 MMHG

## 2021-01-11 PROCEDURE — 93000 ELECTROCARDIOGRAM COMPLETE: CPT

## 2021-01-11 PROCEDURE — 99214 OFFICE O/P EST MOD 30 MIN: CPT | Mod: 25

## 2021-01-11 PROCEDURE — 36415 COLL VENOUS BLD VENIPUNCTURE: CPT

## 2021-01-11 NOTE — END OF VISIT
[Time Spent: ___ minutes] : I have spent [unfilled] minutes of time on the encounter. [FreeTextEntry3] : Documented by Preston Mosqueda acting as a scribe for Dr. Jose Ramon Ramires on 01/11/2021.

## 2021-01-11 NOTE — PHYSICAL EXAM
[General Appearance - Alert] : alert [General Appearance - In No Acute Distress] : in no acute distress [Sclera] : the sclera and conjunctiva were normal [PERRL With Normal Accommodation] : pupils were equal in size, round, and reactive to light [Extraocular Movements] : extraocular movements were intact [Outer Ear] : the ears and nose were normal in appearance [Hearing Threshold Finger Rub Not [place]] : hearing was normal [Examination Of The Oral Cavity] : the lips and gums were normal [Neck Appearance] : the appearance of the neck was normal [Neck Cervical Mass (___cm)] : no neck mass was observed [Jugular Venous Distention Increased] : there was no jugular-venous distention [Thyroid Diffuse Enlargement] : the thyroid was not enlarged [Thyroid Nodule] : there were no palpable thyroid nodules [Auscultation Breath Sounds / Voice Sounds] : lungs were clear to auscultation bilaterally [Heart Rate And Rhythm] : heart rate was normal and rhythm regular [Heart Sounds] : normal S1 and S2 [Heart Sounds Gallop] : no gallops [Murmurs] : no murmurs [Heart Sounds Pericardial Friction Rub] : no pericardial rub [Bowel Sounds] : normal bowel sounds [Abdomen Soft] : soft [Abdomen Tenderness] : non-tender [Abdomen Mass (___ Cm)] : no abdominal mass palpated [No CVA Tenderness] : no ~M costovertebral angle tenderness [No Spinal Tenderness] : no spinal tenderness [Abnormal Walk] : normal gait [Involuntary Movements] : no involuntary movements were seen [Musculoskeletal - Swelling] : no joint swelling seen [Motor Tone] : muscle strength and tone were normal [Skin Color & Pigmentation] : normal skin color and pigmentation [Skin Turgor] : normal skin turgor [] : no rash [No Focal Deficits] : no focal deficits [Oriented To Time, Place, And Person] : oriented to person, place, and time [Impaired Insight] : insight and judgment were intact [Affect] : the affect was normal [FreeTextEntry1] : Trace bilateral LE edema

## 2021-01-11 NOTE — ASSESSMENT
[FreeTextEntry1] : Plan:\par 1) HTN: BP is elevated in office today. Will increase losartan to 50mg BID and will reassess pressure and regimen at next evaluation. If pressure remains elevated, will likely begin diuretic. Also instructed patient to limit sodium intake.\par 2) EKG taken today reveals sinus bradycardia, LAE, LA fascicular block, and LVH.\par 3) HLD: lipids found to be acceptable on rosuvastatin 10mg QD. Due to patient's tolerance of current treatment and acceptable lab results we will not adjust course at this time. Will continue to monitor with lipid profile on blood work today.\par 4) Advised patient to continue COVID precautions throughout the duration of the COVID pandemic. Advised patient to receive COVID vaccine as soon as it becomes available to her, excepting if the patient has a history of severe allergy. 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

## 2021-01-11 NOTE — HISTORY OF PRESENT ILLNESS
[FreeTextEntry1] : The patient is a 69 year old female presenting today for follow-up evaluation of HTN, DM, and HLD.\par \par The patient is feeling generally well today and denies any new medical concerns. 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

## 2021-01-12 LAB
24R-OH-CALCIDIOL SERPL-MCNC: 63.7 PG/ML
25(OH)D3 SERPL-MCNC: 36.8 NG/ML
ALBUMIN SERPL ELPH-MCNC: 4.9 G/DL
ALDOSTERONE SERUM: 5.8 NG/DL
ALP BLD-CCNC: 82 U/L
ALT SERPL-CCNC: 23 U/L
ANION GAP SERPL CALC-SCNC: 13 MMOL/L
APPEARANCE: CLEAR
AST SERPL-CCNC: 25 U/L
BACTERIA: NEGATIVE
BASOPHILS # BLD AUTO: 0.04 K/UL
BASOPHILS NFR BLD AUTO: 0.5 %
BILIRUB SERPL-MCNC: 0.4 MG/DL
BILIRUBIN URINE: NEGATIVE
BLOOD URINE: NEGATIVE
BUN SERPL-MCNC: 18 MG/DL
C3 SERPL-MCNC: 121 MG/DL
C4 SERPL-MCNC: 44 MG/DL
CALCIUM SERPL-MCNC: 10 MG/DL
CALCIUM SERPL-MCNC: 10 MG/DL
CHLORIDE SERPL-SCNC: 102 MMOL/L
CHOLEST SERPL-MCNC: 161 MG/DL
CO2 SERPL-SCNC: 24 MMOL/L
COLOR: NORMAL
CREAT SERPL-MCNC: 0.74 MG/DL
CREAT SPEC-SCNC: 29 MG/DL
CREAT SPEC-SCNC: 29 MG/DL
CREAT/PROT UR: 0.3 RATIO
CRP SERPL-MCNC: 0.29 MG/DL
CYSTATIN C SERPL-MCNC: 0.97 MG/L
EOSINOPHIL # BLD AUTO: 0.07 K/UL
EOSINOPHIL NFR BLD AUTO: 0.8 %
ERYTHROCYTE [SEDIMENTATION RATE] IN BLOOD BY WESTERGREN METHOD: 15 MM/HR
ESTIMATED AVERAGE GLUCOSE: 111 MG/DL
FERRITIN SERPL-MCNC: 124 NG/ML
FOLATE SERPL-MCNC: 11.8 NG/ML
GFR/BSA.PRED SERPLBLD CYS-BASED-ARV: 73 ML/MIN
GLUCOSE QUALITATIVE U: NEGATIVE
GLUCOSE SERPL-MCNC: 95 MG/DL
HBA1C MFR BLD HPLC: 5.5 %
HBV SURFACE AB SER QL: NONREACTIVE
HBV SURFACE AG SER QL: NONREACTIVE
HCT VFR BLD CALC: 42.4 %
HCV AB SER QL: NONREACTIVE
HCV S/CO RATIO: 0.08 S/CO
HCYS SERPL-MCNC: 13 UMOL/L
HDLC SERPL-MCNC: 45 MG/DL
HGB BLD-MCNC: 14 G/DL
HYALINE CASTS: 0 /LPF
IMM GRANULOCYTES NFR BLD AUTO: 0.2 %
IRON SATN MFR SERPL: 24 %
IRON SERPL-MCNC: 77 UG/DL
KETONES URINE: NEGATIVE
LDLC SERPL CALC-MCNC: 41 MG/DL
LEUKOCYTE ESTERASE URINE: ABNORMAL
LYMPHOCYTES # BLD AUTO: 2.21 K/UL
LYMPHOCYTES NFR BLD AUTO: 25.4 %
MAGNESIUM SERPL-MCNC: 2.1 MG/DL
MAN DIFF?: NORMAL
MCHC RBC-ENTMCNC: 31.2 PG
MCHC RBC-ENTMCNC: 33 GM/DL
MCV RBC AUTO: 94.4 FL
MICROALBUMIN 24H UR DL<=1MG/L-MCNC: <1.2 MG/DL
MICROALBUMIN/CREAT 24H UR-RTO: NORMAL MG/G
MICROSCOPIC-UA: NORMAL
MONOCYTES # BLD AUTO: 0.71 K/UL
MONOCYTES NFR BLD AUTO: 8.2 %
NEUTROPHILS # BLD AUTO: 5.65 K/UL
NEUTROPHILS NFR BLD AUTO: 64.9 %
NITRITE URINE: NEGATIVE
NONHDLC SERPL-MCNC: 116 MG/DL
NT-PROBNP SERPL-MCNC: 88 PG/ML
PARATHYROID HORMONE INTACT: 37 PG/ML
PH URINE: 7.5
PHOSPHATE SERPL-MCNC: 3.6 MG/DL
PLATELET # BLD AUTO: 222 K/UL
POTASSIUM SERPL-SCNC: 4 MMOL/L
PROT SERPL-MCNC: 7 G/DL
PROT UR-MCNC: 9 MG/DL
PROTEIN URINE: NEGATIVE
RBC # BLD: 4.49 M/UL
RBC # FLD: 12.7 %
RED BLOOD CELLS URINE: 1 /HPF
RHEUMATOID FACT SER QL: <10 IU/ML
SODIUM SERPL-SCNC: 140 MMOL/L
SPECIFIC GRAVITY URINE: 1.01
SQUAMOUS EPITHELIAL CELLS: 4 /HPF
T3FREE SERPL-MCNC: 2.99 PG/ML
T3RU NFR SERPL: 1.1 TBI
T4 FREE SERPL-MCNC: 1.3 NG/DL
T4 SERPL-MCNC: 8.6 UG/DL
THYROGLOB AB SERPL-ACNC: <20 IU/ML
THYROPEROXIDASE AB SERPL IA-ACNC: <10 IU/ML
TIBC SERPL-MCNC: 315 UG/DL
TRIGL SERPL-MCNC: 375 MG/DL
TSH SERPL-ACNC: 1.41 UIU/ML
UIBC SERPL-MCNC: 238 UG/DL
URATE SERPL-MCNC: 4.5 MG/DL
UROBILINOGEN URINE: NORMAL
VIT B12 SERPL-MCNC: 526 PG/ML
WBC # FLD AUTO: 8.7 K/UL
WHITE BLOOD CELLS URINE: 12 /HPF

## 2021-01-14 LAB
ALBUMIN MFR SERPL ELPH: 63.1 %
ALBUMIN SERPL-MCNC: 4.4 G/DL
ALBUMIN/GLOB SERPL: 1.8 RATIO
ALPHA1 GLOB MFR SERPL ELPH: 3.6 %
ALPHA1 GLOB SERPL ELPH-MCNC: 0.2 G/DL
ALPHA2 GLOB MFR SERPL ELPH: 8.8 %
ALPHA2 GLOB SERPL ELPH-MCNC: 0.6 G/DL
ANA SER IF-ACNC: NEGATIVE
B-GLOBULIN MFR SERPL ELPH: 11.9 %
B-GLOBULIN SERPL ELPH-MCNC: 0.8 G/DL
DEPRECATED KAPPA LC FREE/LAMBDA SER: 1.16 RATIO
GAMMA GLOB FLD ELPH-MCNC: 0.9 G/DL
GAMMA GLOB MFR SERPL ELPH: 12.6 %
IGA SER QL IEP: 193 MG/DL
IGG SER QL IEP: 835 MG/DL
IGM SER QL IEP: 122 MG/DL
INTERPRETATION SERPL IEP-IMP: NORMAL
KAPPA LC CSF-MCNC: 2.18 MG/DL
KAPPA LC SERPL-MCNC: 2.53 MG/DL
M PROTEIN SPEC IFE-MCNC: NORMAL
PROT SERPL-MCNC: 6.9 G/DL
PROT SERPL-MCNC: 6.9 G/DL
SARS-COV-2 IGG SERPL IA-ACNC: 0.31 INDEX
SARS-COV-2 IGG SERPL QL IA: NEGATIVE

## 2021-01-15 LAB
ALP BONE SERPL-MCNC: 14.3 UG/L
COLLAGEN CTX SERPL-MCNC: 138 PG/ML
MPO AB + PR3 PNL SER: NORMAL

## 2021-01-17 LAB — METHYLMALONATE SERPL-SCNC: 164 NMOL/L

## 2021-02-14 ENCOUNTER — RX RENEWAL (OUTPATIENT)
Age: 71
End: 2021-02-14

## 2021-03-08 ENCOUNTER — APPOINTMENT (OUTPATIENT)
Dept: NEPHROLOGY | Facility: CLINIC | Age: 71
End: 2021-03-08
Payer: MEDICARE

## 2021-03-08 ENCOUNTER — LABORATORY RESULT (OUTPATIENT)
Age: 71
End: 2021-03-08

## 2021-03-08 VITALS — SYSTOLIC BLOOD PRESSURE: 144 MMHG | DIASTOLIC BLOOD PRESSURE: 84 MMHG | HEART RATE: 68 BPM

## 2021-03-08 VITALS — HEART RATE: 72 BPM | DIASTOLIC BLOOD PRESSURE: 82 MMHG | SYSTOLIC BLOOD PRESSURE: 133 MMHG

## 2021-03-08 VITALS — HEART RATE: 79 BPM | DIASTOLIC BLOOD PRESSURE: 80 MMHG | SYSTOLIC BLOOD PRESSURE: 132 MMHG

## 2021-03-08 PROCEDURE — 99214 OFFICE O/P EST MOD 30 MIN: CPT | Mod: CS

## 2021-03-08 NOTE — ADDENDUM
[FreeTextEntry1] : All medical record entries made by the Scribe were at my, Dr. Jose Ramon Ramires, direction and personally dictated by me on 03/08/2021. I have reviewed the chart and agree that the record accurately reflects my personal performance of the history, physical exam, assessment and plan. I have also personally directed, reviewed, and agreed with the chart.

## 2021-03-08 NOTE — PHYSICAL EXAM
[General Appearance - Alert] : alert [General Appearance - In No Acute Distress] : in no acute distress [Sclera] : the sclera and conjunctiva were normal [PERRL With Normal Accommodation] : pupils were equal in size, round, and reactive to light [Extraocular Movements] : extraocular movements were intact [Outer Ear] : the ears and nose were normal in appearance [Hearing Threshold Finger Rub Not [place]] : hearing was normal [Examination Of The Oral Cavity] : the lips and gums were normal [Neck Appearance] : the appearance of the neck was normal [Neck Cervical Mass (___cm)] : no neck mass was observed [Jugular Venous Distention Increased] : there was no jugular-venous distention [Thyroid Diffuse Enlargement] : the thyroid was not enlarged [Thyroid Nodule] : there were no palpable thyroid nodules [Auscultation Breath Sounds / Voice Sounds] : lungs were clear to auscultation bilaterally [Heart Rate And Rhythm] : heart rate was normal and rhythm regular [Heart Sounds] : normal S1 and S2 [Heart Sounds Gallop] : no gallops [Murmurs] : no murmurs [Heart Sounds Pericardial Friction Rub] : no pericardial rub [Edema] : there was no peripheral edema [Bowel Sounds] : normal bowel sounds [Abdomen Soft] : soft [Abdomen Tenderness] : non-tender [Abdomen Mass (___ Cm)] : no abdominal mass palpated [No CVA Tenderness] : no ~M costovertebral angle tenderness [No Spinal Tenderness] : no spinal tenderness [Abnormal Walk] : normal gait [Involuntary Movements] : no involuntary movements were seen [Musculoskeletal - Swelling] : no joint swelling seen [Motor Tone] : muscle strength and tone were normal [Skin Color & Pigmentation] : normal skin color and pigmentation [Skin Turgor] : normal skin turgor [] : no rash [No Focal Deficits] : no focal deficits [Oriented To Time, Place, And Person] : oriented to person, place, and time [Impaired Insight] : insight and judgment were intact [Affect] : the affect was normal

## 2021-03-08 NOTE — ASSESSMENT
[FreeTextEntry1] : Plan:\par 1) HTN: BP is borderline elevated in office today, up to 144 supine. In lieu of starting new medication at this time, advised patient to attempt 3-4 lbs weight loss and limit sodium intake. Will reassess pressure and regimen at next evaluation. Instructed patient to obtain a home BP cuff and to log her BP daily.\par 2) HLD: lipids found to be generally acceptable on rosuvastatin 10mg QD. Due to patient's tolerance of current treatment and acceptable lab results we will not adjust course at this time. Will continue to monitor with lipid profile on blood work today.\par 3) Advised patient to continue COVID precautions throughout the duration of the COVID pandemic. Patient has reportedly received both doses of the Moderna COVID vaccine.\par 4) Obesity: As above, advised patient to limit caloric intake and increase exercise regimen, as tolerated and weather permitting, with goal of 3-4 lbs weight reduction.\par \par No changes to medications.\par \par Labs were drawn and patient will return in 4-6 weeks for a follow-up appointment.

## 2021-03-08 NOTE — END OF VISIT
[Time Spent: ___ minutes] : I have spent [unfilled] minutes of time on the encounter. [FreeTextEntry3] : Documented by Preston Mosqueda acting as a scribe for Dr. Jose Ramon Ramires on 03/08/2021.

## 2021-03-08 NOTE — HISTORY OF PRESENT ILLNESS
[FreeTextEntry1] : The patient is a 70 year old female presenting today for follow-up evaluation of HTN, DM, and HLD.\par \par The patient is feeling generally well today. She states she has received both doses of a COVID vaccine since last visit. 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 She admits to not monitoring her BP recently, and reports that her weight has remained approximately unchanged since last visit.\par \par Labs taken 1/11/21 reveal: triglycerides 375, HLD 45, LDL 41, creatinine 0.74, eGFR 82\par \par Current Medications: Crestor 10mg QD, Vitamin D 1000 units QD, losartan 50mg BID

## 2021-03-09 LAB
24R-OH-CALCIDIOL SERPL-MCNC: 67.9 PG/ML
25(OH)D3 SERPL-MCNC: 46.1 NG/ML
ALBUMIN SERPL ELPH-MCNC: 5 G/DL
ALP BLD-CCNC: 78 U/L
ALT SERPL-CCNC: 19 U/L
ANION GAP SERPL CALC-SCNC: 13 MMOL/L
AST SERPL-CCNC: 27 U/L
BASOPHILS # BLD AUTO: 0.04 K/UL
BASOPHILS NFR BLD AUTO: 0.4 %
BILIRUB SERPL-MCNC: 0.7 MG/DL
BUN SERPL-MCNC: 20 MG/DL
CALCIUM SERPL-MCNC: 10.3 MG/DL
CALCIUM SERPL-MCNC: 10.3 MG/DL
CHLORIDE SERPL-SCNC: 101 MMOL/L
CHOLEST SERPL-MCNC: 170 MG/DL
CO2 SERPL-SCNC: 27 MMOL/L
CREAT SERPL-MCNC: 0.7 MG/DL
CRP SERPL-MCNC: 4 MG/L
EOSINOPHIL # BLD AUTO: 0.06 K/UL
EOSINOPHIL NFR BLD AUTO: 0.6 %
ERYTHROCYTE [SEDIMENTATION RATE] IN BLOOD BY WESTERGREN METHOD: 17 MM/HR
ESTIMATED AVERAGE GLUCOSE: 117 MG/DL
FERRITIN SERPL-MCNC: 148 NG/ML
FOLATE SERPL-MCNC: 19.9 NG/ML
GLUCOSE SERPL-MCNC: 92 MG/DL
HBA1C MFR BLD HPLC: 5.7 %
HCT VFR BLD CALC: 42.6 %
HDLC SERPL-MCNC: 56 MG/DL
HGB BLD-MCNC: 14.3 G/DL
IMM GRANULOCYTES NFR BLD AUTO: 0.3 %
IRON SATN MFR SERPL: 22 %
IRON SERPL-MCNC: 77 UG/DL
LDLC SERPL CALC-MCNC: 92 MG/DL
LYMPHOCYTES # BLD AUTO: 2.27 K/UL
LYMPHOCYTES NFR BLD AUTO: 24.5 %
MAGNESIUM SERPL-MCNC: 2.2 MG/DL
MAN DIFF?: NORMAL
MCHC RBC-ENTMCNC: 30.6 PG
MCHC RBC-ENTMCNC: 33.6 GM/DL
MCV RBC AUTO: 91.2 FL
MONOCYTES # BLD AUTO: 0.7 K/UL
MONOCYTES NFR BLD AUTO: 7.6 %
NEUTROPHILS # BLD AUTO: 6.17 K/UL
NEUTROPHILS NFR BLD AUTO: 66.6 %
NONHDLC SERPL-MCNC: 115 MG/DL
PARATHYROID HORMONE INTACT: 28 PG/ML
PHOSPHATE SERPL-MCNC: 4.3 MG/DL
PLATELET # BLD AUTO: 239 K/UL
POTASSIUM SERPL-SCNC: 5 MMOL/L
PROT SERPL-MCNC: 7.4 G/DL
RBC # BLD: 4.67 M/UL
RBC # FLD: 12.9 %
RHEUMATOID FACT SER QL: <10 IU/ML
SARS-COV-2 IGG SERPL IA-ACNC: 3.23 INDEX
SARS-COV-2 IGG SERPL QL IA: POSITIVE
SODIUM SERPL-SCNC: 141 MMOL/L
T3FREE SERPL-MCNC: 3.15 PG/ML
T3RU NFR SERPL: 1.1 TBI
T4 FREE SERPL-MCNC: 1.4 NG/DL
T4 SERPL-MCNC: 9.5 UG/DL
TIBC SERPL-MCNC: 351 UG/DL
TRIGL SERPL-MCNC: 111 MG/DL
TSH SERPL-ACNC: 1.47 UIU/ML
UIBC SERPL-MCNC: 274 UG/DL
URATE SERPL-MCNC: 3.8 MG/DL
VIT B12 SERPL-MCNC: 508 PG/ML
WBC # FLD AUTO: 9.27 K/UL

## 2021-03-10 LAB
ALBUMIN MFR SERPL ELPH: 61.9 %
ALBUMIN SERPL-MCNC: 4.6 G/DL
ALBUMIN/GLOB SERPL: 1.6 RATIO
ALDOSTERONE SERUM: 5.1 NG/DL
ALPHA1 GLOB MFR SERPL ELPH: 3.8 %
ALPHA1 GLOB SERPL ELPH-MCNC: 0.3 G/DL
ALPHA2 GLOB MFR SERPL ELPH: 9.9 %
ALPHA2 GLOB SERPL ELPH-MCNC: 0.7 G/DL
ANA SER IF-ACNC: NEGATIVE
APPEARANCE: ABNORMAL
B-GLOBULIN MFR SERPL ELPH: 11.4 %
B-GLOBULIN SERPL ELPH-MCNC: 0.8 G/DL
BACTERIA: ABNORMAL
BILIRUBIN URINE: NEGATIVE
BLOOD URINE: NORMAL
C3 SERPL-MCNC: 126 MG/DL
C4 SERPL-MCNC: 41 MG/DL
COLOR: NORMAL
DEPRECATED KAPPA LC FREE/LAMBDA SER: 1.23 RATIO
GAMMA GLOB FLD ELPH-MCNC: 1 G/DL
GAMMA GLOB MFR SERPL ELPH: 13 %
GLUCOSE QUALITATIVE U: NEGATIVE
HBV SURFACE AB SER QL: NONREACTIVE
HBV SURFACE AG SER QL: NONREACTIVE
HCV AB SER QL: NONREACTIVE
HCV S/CO RATIO: 0.09 S/CO
HCYS SERPL-MCNC: 14.4 UMOL/L
HYALINE CASTS: 1 /LPF
IGA SER QL IEP: 205 MG/DL
IGG SER QL IEP: 874 MG/DL
IGM SER QL IEP: 135 MG/DL
INTERPRETATION SERPL IEP-IMP: NORMAL
KAPPA LC CSF-MCNC: 2.03 MG/DL
KAPPA LC SERPL-MCNC: 2.49 MG/DL
KETONES URINE: NEGATIVE
LEUKOCYTE ESTERASE URINE: ABNORMAL
M PROTEIN SPEC IFE-MCNC: NORMAL
MICROSCOPIC-UA: NORMAL
NITRITE URINE: NEGATIVE
PH URINE: 7.5
PROT SERPL-MCNC: 7.4 G/DL
PROT SERPL-MCNC: 7.4 G/DL
PROTEIN URINE: NEGATIVE
RED BLOOD CELLS URINE: 2 /HPF
RENIN PLASMA: 4.8 PG/ML
SPECIFIC GRAVITY URINE: 1.01
SQUAMOUS EPITHELIAL CELLS: 26 /HPF
THYROGLOB AB SERPL-ACNC: <20 IU/ML
THYROPEROXIDASE AB SERPL IA-ACNC: <10 IU/ML
TRIPLE PHOSPHATE CRYSTALS: ABNORMAL
URINE COMMENTS: NORMAL
UROBILINOGEN URINE: NORMAL
WHITE BLOOD CELLS URINE: 121 /HPF

## 2021-03-11 LAB — ALP BONE SERPL-MCNC: 15.3 UG/L

## 2021-03-13 LAB
COLLAGEN CTX SERPL-MCNC: 120 PG/ML
MPO AB + PR3 PNL SER: NORMAL

## 2021-03-19 LAB — METHYLMALONATE SERPL-SCNC: 161 NMOL/L

## 2021-07-08 NOTE — ADDENDUM
[FreeTextEntry1] : All medical record entries made by the Scribe were at my, Dr. Jose Ramon Ramires, direction and personally dictated by me on 01/11/2021. I have reviewed the chart and agree that the record accurately reflects my personal performance of the history, physical exam, assessment and plan. I have also personally directed, reviewed, and agreed with the chart. Detail Level: Simple

## 2021-07-21 ENCOUNTER — RX RENEWAL (OUTPATIENT)
Age: 71
End: 2021-07-21

## 2021-07-22 ENCOUNTER — RX RENEWAL (OUTPATIENT)
Age: 71
End: 2021-07-22

## 2021-08-31 ENCOUNTER — LABORATORY RESULT (OUTPATIENT)
Age: 71
End: 2021-08-31

## 2021-08-31 ENCOUNTER — APPOINTMENT (OUTPATIENT)
Dept: NEPHROLOGY | Facility: CLINIC | Age: 71
End: 2021-08-31
Payer: MEDICARE

## 2021-08-31 VITALS — DIASTOLIC BLOOD PRESSURE: 89 MMHG | HEART RATE: 67 BPM | SYSTOLIC BLOOD PRESSURE: 157 MMHG

## 2021-08-31 VITALS — SYSTOLIC BLOOD PRESSURE: 151 MMHG | DIASTOLIC BLOOD PRESSURE: 84 MMHG | HEART RATE: 69 BPM

## 2021-08-31 VITALS — BODY MASS INDEX: 35.12 KG/M2 | WEIGHT: 192 LBS | TEMPERATURE: 96.9 F | HEART RATE: 71 BPM | OXYGEN SATURATION: 98 %

## 2021-08-31 DIAGNOSIS — F43.10 POST-TRAUMATIC STRESS DISORDER, UNSPECIFIED: ICD-10-CM

## 2021-08-31 PROCEDURE — 99215 OFFICE O/P EST HI 40 MIN: CPT | Mod: CS,25

## 2021-08-31 PROCEDURE — 36415 COLL VENOUS BLD VENIPUNCTURE: CPT

## 2021-08-31 NOTE — HISTORY OF PRESENT ILLNESS
[FreeTextEntry1] : The patient is a 70 year old female presenting today for follow-up evaluation of HTN, DM, and HLD.\par \par The patient has recently been seen by two different orthopaedists for arthritic knee pain. She also notes recent weight gain; she weighs 192 lbs in office today, up 10 lbs over the past 10 months. She reports her home BP has been in general range of 140 systolic.\par - Of note, the patient has been taking losartan 50mg QD instead of BID out of confusion over dosage.\par \par Labs 3/8/21: positive COVID spike domain antibody, creatinine 0.70, eGFR 88, HbA1c 5.7, urine , few urine bacteria, LDL 92\par \par Current Medications: rosuvastatin 10mg QD, Vitamin D 1000 units QD, losartan 50mg BID (patient only taking QD)

## 2021-08-31 NOTE — ASSESSMENT
[FreeTextEntry1] : Plan:\par 1) HTN: BP is elevated in office today. Will start HCTZ 25mg QD and increase losartan to 50mg BID as previously. Patient to check will reassess pressure and regimen at next evaluation.\par 2) HLD: lipids found to be well-controlled on rosuvastatin 10mg QD. Due to patient's tolerance of current treatment and acceptable lab results we will not adjust course at this time. Will continue to monitor with lipid profile on blood work today.\par 3) Advised patient to receive a COVID vaccine booster dose 8 months from the time of the most recent vaccine dose received.\par 4) Obesity: Advised patient to limit caloric intake and increase exercise regimen (swimming), as tolerated and weather permitting, with goal of gradual reduction in weight. Still also start metformin 500mg BID, with plan to increase to 1000mg BID if tolerated.\par 5) Knee pain: Per patient request, referred patient to Dr. Gil Brantley for third orthopaedic opinion.\par 6) Will plan for patient to receive influenza vaccine at next visit.\par \par Changes to medications: stat metformin 500mg BID, start HCTZ 25mg QD, increase losartan to 50mg BID\par \par Labs were drawn and patient will return in 1 month for a follow-up appointment.

## 2021-08-31 NOTE — PHYSICAL EXAM
[General Appearance - Alert] : alert [General Appearance - In No Acute Distress] : in no acute distress [Sclera] : the sclera and conjunctiva were normal [PERRL With Normal Accommodation] : pupils were equal in size, round, and reactive to light [Extraocular Movements] : extraocular movements were intact [Outer Ear] : the ears and nose were normal in appearance [Hearing Threshold Finger Rub Not [place]] : hearing was normal [Examination Of The Oral Cavity] : the lips and gums were normal [Neck Appearance] : the appearance of the neck was normal [Neck Cervical Mass (___cm)] : no neck mass was observed [Jugular Venous Distention Increased] : there was no jugular-venous distention [Thyroid Diffuse Enlargement] : the thyroid was not enlarged [Thyroid Nodule] : there were no palpable thyroid nodules [Auscultation Breath Sounds / Voice Sounds] : lungs were clear to auscultation bilaterally [Heart Rate And Rhythm] : heart rate was normal and rhythm regular [Heart Sounds] : normal S1 and S2 [Heart Sounds Gallop] : no gallops [Murmurs] : no murmurs [Heart Sounds Pericardial Friction Rub] : no pericardial rub [Bowel Sounds] : normal bowel sounds [Abdomen Soft] : soft [Abdomen Tenderness] : non-tender [Abdomen Mass (___ Cm)] : no abdominal mass palpated [No CVA Tenderness] : no ~M costovertebral angle tenderness [No Spinal Tenderness] : no spinal tenderness [Abnormal Walk] : normal gait [Involuntary Movements] : no involuntary movements were seen [Musculoskeletal - Swelling] : no joint swelling seen [Motor Tone] : muscle strength and tone were normal [Skin Color & Pigmentation] : normal skin color and pigmentation [Skin Turgor] : normal skin turgor [] : no rash [No Focal Deficits] : no focal deficits [Oriented To Time, Place, And Person] : oriented to person, place, and time [Impaired Insight] : insight and judgment were intact [Affect] : the affect was normal [FreeTextEntry1] : 1+ bilateral LE edema

## 2021-08-31 NOTE — END OF VISIT
[Time Spent: ___ minutes] : I have spent [unfilled] minutes of time on the encounter. [FreeTextEntry3] : Documented by Preston Mosqueda acting as a scribe for Dr. Jose Ramon Ramires on 08/31/2021.

## 2021-08-31 NOTE — ADDENDUM
[FreeTextEntry1] : All medical record entries made by the Scribe were at my, Dr. Jose Ramon Ramires, direction and personally dictated by me on 08/31/2021. I have reviewed the chart and agree that the record accurately reflects my personal performance of the history, physical exam, assessment and plan. I have also personally directed, reviewed, and agreed with the chart.

## 2021-09-02 LAB
24R-OH-CALCIDIOL SERPL-MCNC: 65.2 PG/ML
25(OH)D3 SERPL-MCNC: 49 NG/ML
ALBUMIN SERPL ELPH-MCNC: 5 G/DL
ALDOSTERONE SERUM: 6.8 NG/DL
ALP BLD-CCNC: 87 U/L
ALT SERPL-CCNC: 22 U/L
ANA SER IF-ACNC: NEGATIVE
ANION GAP SERPL CALC-SCNC: 8 MMOL/L
APPEARANCE: CLEAR
AST SERPL-CCNC: 19 U/L
BACTERIA: NEGATIVE
BASOPHILS # BLD AUTO: 0.04 K/UL
BASOPHILS NFR BLD AUTO: 0.4 %
BILIRUB SERPL-MCNC: 0.6 MG/DL
BILIRUBIN URINE: NEGATIVE
BLOOD URINE: NEGATIVE
BUN SERPL-MCNC: 17 MG/DL
C3 SERPL-MCNC: 120 MG/DL
C4 SERPL-MCNC: 44 MG/DL
CALCIUM SERPL-MCNC: 10.1 MG/DL
CALCIUM SERPL-MCNC: 10.1 MG/DL
CHLORIDE SERPL-SCNC: 102 MMOL/L
CHOLEST SERPL-MCNC: 154 MG/DL
CO2 SERPL-SCNC: 27 MMOL/L
COLOR: NORMAL
COVID-19 NUCLEOCAPSID  GAM ANTIBODY INTERPRETATION: POSITIVE
COVID-19 SPIKE DOMAIN ANTIBODY INTERPRETATION: POSITIVE
CREAT SERPL-MCNC: 0.65 MG/DL
CREAT SPEC-SCNC: 23 MG/DL
CREAT/PROT UR: 0.3 RATIO
CRP SERPL-MCNC: 5 MG/L
CYSTATIN C SERPL-MCNC: 0.81 MG/L
EOSINOPHIL # BLD AUTO: 0.06 K/UL
EOSINOPHIL NFR BLD AUTO: 0.6 %
ERYTHROCYTE [SEDIMENTATION RATE] IN BLOOD BY WESTERGREN METHOD: 21 MM/HR
ESTIMATED AVERAGE GLUCOSE: 117 MG/DL
FERRITIN SERPL-MCNC: 130 NG/ML
FOLATE SERPL-MCNC: 12.7 NG/ML
GFR/BSA.PRED SERPLBLD CYS-BASED-ARV: 92 ML/MIN
GLUCOSE QUALITATIVE U: NEGATIVE
GLUCOSE SERPL-MCNC: 100 MG/DL
HBA1C MFR BLD HPLC: 5.7 %
HBV SURFACE AB SER QL: NONREACTIVE
HBV SURFACE AG SER QL: NONREACTIVE
HCT VFR BLD CALC: 43 %
HCV AB SER QL: NONREACTIVE
HCV S/CO RATIO: 0.12 S/CO
HCYS SERPL-MCNC: 9.3 UMOL/L
HDLC SERPL-MCNC: 51 MG/DL
HGB BLD-MCNC: 14.2 G/DL
HYALINE CASTS: 0 /LPF
IMM GRANULOCYTES NFR BLD AUTO: 0.2 %
IRON SATN MFR SERPL: 27 %
IRON SERPL-MCNC: 84 UG/DL
KETONES URINE: NEGATIVE
LDLC SERPL CALC-MCNC: 75 MG/DL
LEUKOCYTE ESTERASE URINE: ABNORMAL
LYMPHOCYTES # BLD AUTO: 2.46 K/UL
LYMPHOCYTES NFR BLD AUTO: 23.7 %
MAGNESIUM SERPL-MCNC: 2.1 MG/DL
MAN DIFF?: NORMAL
MCHC RBC-ENTMCNC: 30.5 PG
MCHC RBC-ENTMCNC: 33 GM/DL
MCV RBC AUTO: 92.5 FL
MICROSCOPIC-UA: NORMAL
MONOCYTES # BLD AUTO: 0.91 K/UL
MONOCYTES NFR BLD AUTO: 8.8 %
MPO AB + PR3 PNL SER: NORMAL
NEUTROPHILS # BLD AUTO: 6.91 K/UL
NEUTROPHILS NFR BLD AUTO: 66.3 %
NITRITE URINE: NEGATIVE
NONHDLC SERPL-MCNC: 103 MG/DL
PARATHYROID HORMONE INTACT: 31 PG/ML
PH URINE: 7
PHOSPHATE SERPL-MCNC: 3.5 MG/DL
PLATELET # BLD AUTO: 234 K/UL
POTASSIUM SERPL-SCNC: 4.5 MMOL/L
PROT SERPL-MCNC: 7 G/DL
PROT UR-MCNC: 6 MG/DL
PROTEIN URINE: NEGATIVE
RBC # BLD: 4.65 M/UL
RBC # FLD: 12.8 %
RED BLOOD CELLS URINE: 1 /HPF
RENIN PLASMA: 3.8 PG/ML
RHEUMATOID FACT SER QL: <10 IU/ML
SARS-COV-2 AB SERPL IA-ACNC: >250 U/ML
SARS-COV-2 AB SERPL QL IA: 1.77 INDEX
SODIUM SERPL-SCNC: 138 MMOL/L
SPECIFIC GRAVITY URINE: 1.01
SQUAMOUS EPITHELIAL CELLS: 1 /HPF
T3FREE SERPL-MCNC: 3.25 PG/ML
T3RU NFR SERPL: 1.1 TBI
T4 FREE SERPL-MCNC: 1.4 NG/DL
T4 SERPL-MCNC: 8.8 UG/DL
THYROGLOB AB SERPL-ACNC: 992 IU/ML
THYROPEROXIDASE AB SERPL IA-ACNC: <10 IU/ML
TIBC SERPL-MCNC: 311 UG/DL
TRIGL SERPL-MCNC: 141 MG/DL
TSH SERPL-ACNC: 1.61 UIU/ML
UIBC SERPL-MCNC: 227 UG/DL
URATE SERPL-MCNC: 3.6 MG/DL
UROBILINOGEN URINE: NORMAL
VIT B12 SERPL-MCNC: 458 PG/ML
WBC # FLD AUTO: 10.4 K/UL
WHITE BLOOD CELLS URINE: 1 /HPF

## 2021-09-03 LAB — METHYLMALONATE SERPL-SCNC: 101 NMOL/L

## 2021-09-05 LAB — ALP BONE SERPL-MCNC: 13.3 UG/L

## 2021-09-06 LAB — COLLAGEN CTX SERPL-MCNC: 124 PG/ML

## 2021-09-08 LAB
ALBUMIN MFR SERPL ELPH: 62.9 %
ALBUMIN SERPL-MCNC: 4.4 G/DL
ALBUMIN/GLOB SERPL: 1.7 RATIO
ALPHA1 GLOB MFR SERPL ELPH: 3.9 %
ALPHA1 GLOB SERPL ELPH-MCNC: 0.3 G/DL
ALPHA2 GLOB MFR SERPL ELPH: 9.5 %
ALPHA2 GLOB SERPL ELPH-MCNC: 0.7 G/DL
B-GLOBULIN MFR SERPL ELPH: 11.3 %
B-GLOBULIN SERPL ELPH-MCNC: 0.8 G/DL
DEPRECATED KAPPA LC FREE/LAMBDA SER: 0.98 RATIO
GAMMA GLOB FLD ELPH-MCNC: 0.9 G/DL
GAMMA GLOB MFR SERPL ELPH: 12.4 %
IGA SER QL IEP: 180 MG/DL
IGG SER QL IEP: 843 MG/DL
IGM SER QL IEP: 124 MG/DL
INTERPRETATION SERPL IEP-IMP: NORMAL
KAPPA LC CSF-MCNC: 1.61 MG/DL
KAPPA LC SERPL-MCNC: 1.58 MG/DL
M PROTEIN SPEC IFE-MCNC: NORMAL
PROT SERPL-MCNC: 7 G/DL
PROT SERPL-MCNC: 7 G/DL

## 2021-11-15 ENCOUNTER — APPOINTMENT (OUTPATIENT)
Dept: NEPHROLOGY | Facility: CLINIC | Age: 71
End: 2021-11-15
Payer: MEDICARE

## 2021-11-15 ENCOUNTER — LABORATORY RESULT (OUTPATIENT)
Age: 71
End: 2021-11-15

## 2021-11-15 VITALS — BODY MASS INDEX: 35.12 KG/M2 | SYSTOLIC BLOOD PRESSURE: 158 MMHG | DIASTOLIC BLOOD PRESSURE: 69 MMHG | WEIGHT: 192 LBS

## 2021-11-15 VITALS — SYSTOLIC BLOOD PRESSURE: 154 MMHG | HEART RATE: 66 BPM | DIASTOLIC BLOOD PRESSURE: 80 MMHG

## 2021-11-15 VITALS — HEART RATE: 65 BPM | DIASTOLIC BLOOD PRESSURE: 85 MMHG | SYSTOLIC BLOOD PRESSURE: 153 MMHG

## 2021-11-15 VITALS — DIASTOLIC BLOOD PRESSURE: 85 MMHG | SYSTOLIC BLOOD PRESSURE: 134 MMHG

## 2021-11-15 PROCEDURE — G0008: CPT

## 2021-11-15 PROCEDURE — 90662 IIV NO PRSV INCREASED AG IM: CPT

## 2021-11-15 PROCEDURE — 99214 OFFICE O/P EST MOD 30 MIN: CPT | Mod: 25

## 2021-11-15 PROCEDURE — 93000 ELECTROCARDIOGRAM COMPLETE: CPT

## 2021-11-15 PROCEDURE — 36415 COLL VENOUS BLD VENIPUNCTURE: CPT

## 2021-11-15 NOTE — PHYSICAL EXAM
[General Appearance - Alert] : alert [General Appearance - In No Acute Distress] : in no acute distress [Sclera] : the sclera and conjunctiva were normal [PERRL With Normal Accommodation] : pupils were equal in size, round, and reactive to light [Extraocular Movements] : extraocular movements were intact [Outer Ear] : the ears and nose were normal in appearance [Hearing Threshold Finger Rub Not [place]] : hearing was normal [Neck Appearance] : the appearance of the neck was normal [Neck Cervical Mass (___cm)] : no neck mass was observed [Jugular Venous Distention Increased] : there was no jugular-venous distention [Thyroid Diffuse Enlargement] : the thyroid was not enlarged [Thyroid Nodule] : there were no palpable thyroid nodules [Auscultation Breath Sounds / Voice Sounds] : lungs were clear to auscultation bilaterally [Heart Rate And Rhythm] : heart rate was normal and rhythm regular [Heart Sounds] : normal S1 and S2 [Heart Sounds Gallop] : no gallops [Murmurs] : no murmurs [Heart Sounds Pericardial Friction Rub] : no pericardial rub [Bowel Sounds] : normal bowel sounds [Abdomen Soft] : soft [Abdomen Tenderness] : non-tender [Abdomen Mass (___ Cm)] : no abdominal mass palpated [No CVA Tenderness] : no ~M costovertebral angle tenderness [No Spinal Tenderness] : no spinal tenderness [Abnormal Walk] : normal gait [Involuntary Movements] : no involuntary movements were seen [Musculoskeletal - Swelling] : no joint swelling seen [Motor Tone] : muscle strength and tone were normal [Skin Color & Pigmentation] : normal skin color and pigmentation [Skin Turgor] : normal skin turgor [] : no rash [No Focal Deficits] : no focal deficits [Oriented To Time, Place, And Person] : oriented to person, place, and time [Impaired Insight] : insight and judgment were intact [Affect] : the affect was normal [FreeTextEntry1] : trace nonpitting bilateral LE edema

## 2021-11-15 NOTE — ADDENDUM
[FreeTextEntry1] : All medical record entries made by the Scribe were at my, SUN Haro's direction and personally dictated by me on 11/15/2021. I have received the chart and agree that the record accurately reflects my personal performance of the history, physical exam, assessment, and plan. I have also personally directed, reviewed, and agreed with the chart.\par

## 2021-11-15 NOTE — END OF VISIT
[Time Spent: ___ minutes] : I have spent [unfilled] minutes of time on the encounter. [FreeTextEntry3] : Documented by Carmen Salmeron acting as a scribe for SUN Haro on 11/15/2021.\par

## 2021-11-15 NOTE — HISTORY OF PRESENT ILLNESS
[FreeTextEntry1] : The patient is a 70 year old female presenting today for follow-up evaluation of HTN, DM, and HLD. Last seen August 2021.\par \par The patient c/o of right upper extremity pain since she received Moderna COVID vaccine in March. Pt reportedly had last mammogram this past summer (last mammogram on record 2019) and last bone density on record 2016. She has not yet received COVID booster dose or annual influenza vaccine and requests influenza shot today. \par \par Labs 08/31/21: LDL 75, glucose 100, creatinine 0.65, eGFR 90, sed rate 21, positive COVID spike domain antibody with nucleocapsid, HgbA1c 5.7%, protein/creat ratio 0.3,  C4 44, thyroglobulin Ab 992, \par \par Current Medications: rosuvastatin 10mg QD, Vitamin D 1000 units QD, metformin 500mg BID, HCTZ 25mg QD, losartan 50mg QD

## 2021-11-15 NOTE — ASSESSMENT
[FreeTextEntry1] : Plan:\par 1) HTN: BP is elevated in office today. Will therefore increase losartan to 50mg BID and will reassess pressure and regimen at next evaluation. \par 2) HLD: lipids found to be well-controlled on rosuvastatin 10mg QD. Due to patient's tolerance of current treatment and acceptable lab results we will not adjust course at this time. Will continue to monitor with lipid profile on blood work today.\par 3) Advised patient to receive a COVID vaccine booster dose as soon as possible in view of persistent risks. \par 4) Obesity: Advised patient to limit caloric intake and increase exercise regimen (swimming), as tolerated and weather permitting, with goal of gradual reduction in weight. Continue metformin 500mg BID.\par 5) Administered senior influenza vaccine subcutaneously into left deltoid without incident. \par 6) EKG taken today reveals sinus [place], left bundle, LAFB, LVH, no change from before.\par 7) Well Care: Pt to f/u with her dermatologist for skin check. To have cardiology f/u with Dr. Piedra.\par \par Changes to medications: Increase losartan to 50mg BID.\par \par Labs were drawn and patient will return in 6 weeks for a follow-up appointment.

## 2021-11-18 LAB
24R-OH-CALCIDIOL SERPL-MCNC: 49.5 PG/ML
25(OH)D3 SERPL-MCNC: 40.1 NG/ML
ALBUMIN SERPL ELPH-MCNC: 4.7 G/DL
ALDOSTERONE SERUM: 4.7 NG/DL
ALP BLD-CCNC: 80 U/L
ALT SERPL-CCNC: 19 U/L
ANA SER IF-ACNC: NEGATIVE
ANION GAP SERPL CALC-SCNC: 14 MMOL/L
APPEARANCE: CLEAR
AST SERPL-CCNC: 22 U/L
BACTERIA: NEGATIVE
BASOPHILS # BLD AUTO: 0.04 K/UL
BASOPHILS NFR BLD AUTO: 0.5 %
BILIRUB SERPL-MCNC: 0.5 MG/DL
BILIRUBIN URINE: NEGATIVE
BLOOD URINE: NEGATIVE
BUN SERPL-MCNC: 13 MG/DL
C3 SERPL-MCNC: 138 MG/DL
C4 SERPL-MCNC: 44 MG/DL
CALCIUM SERPL-MCNC: 10 MG/DL
CALCIUM SERPL-MCNC: 10 MG/DL
CHLORIDE SERPL-SCNC: 102 MMOL/L
CHOLEST SERPL-MCNC: 187 MG/DL
CO2 SERPL-SCNC: 26 MMOL/L
COLOR: NORMAL
CREAT SERPL-MCNC: 0.58 MG/DL
CREAT SPEC-SCNC: 29 MG/DL
CREAT/PROT UR: 0.3 RATIO
CRP SERPL-MCNC: 5 MG/L
CYSTATIN C SERPL-MCNC: 0.91 MG/L
EOSINOPHIL # BLD AUTO: 0.06 K/UL
EOSINOPHIL NFR BLD AUTO: 0.7 %
ERYTHROCYTE [SEDIMENTATION RATE] IN BLOOD BY WESTERGREN METHOD: 12 MM/HR
ESTIMATED AVERAGE GLUCOSE: 126 MG/DL
FERRITIN SERPL-MCNC: 152 NG/ML
FOLATE SERPL-MCNC: 14.5 NG/ML
GFR/BSA.PRED SERPLBLD CYS-BASED-ARV: 79 ML/MIN
GLUCOSE QUALITATIVE U: NEGATIVE
GLUCOSE SERPL-MCNC: 94 MG/DL
HBA1C MFR BLD HPLC: 6 %
HBV SURFACE AB SER QL: NONREACTIVE
HBV SURFACE AG SER QL: NONREACTIVE
HCT VFR BLD CALC: 43 %
HCV AB SER QL: NONREACTIVE
HCV S/CO RATIO: 0.11 S/CO
HCYS SERPL-MCNC: 8.9 UMOL/L
HDLC SERPL-MCNC: 49 MG/DL
HGB BLD-MCNC: 14.4 G/DL
HYALINE CASTS: 0 /LPF
IMM GRANULOCYTES NFR BLD AUTO: 0.2 %
IRON SATN MFR SERPL: 24 %
IRON SERPL-MCNC: 76 UG/DL
KETONES URINE: NEGATIVE
LDLC SERPL CALC-MCNC: 86 MG/DL
LEUKOCYTE ESTERASE URINE: ABNORMAL
LYMPHOCYTES # BLD AUTO: 1.75 K/UL
LYMPHOCYTES NFR BLD AUTO: 20.4 %
MAGNESIUM SERPL-MCNC: 2.1 MG/DL
MAN DIFF?: NORMAL
MCHC RBC-ENTMCNC: 31 PG
MCHC RBC-ENTMCNC: 33.5 GM/DL
MCV RBC AUTO: 92.5 FL
MICROSCOPIC-UA: NORMAL
MONOCYTES # BLD AUTO: 0.6 K/UL
MONOCYTES NFR BLD AUTO: 7 %
MPO AB + PR3 PNL SER: NORMAL
NEUTROPHILS # BLD AUTO: 6.1 K/UL
NEUTROPHILS NFR BLD AUTO: 71.2 %
NITRITE URINE: NEGATIVE
NONHDLC SERPL-MCNC: 138 MG/DL
NT-PROBNP SERPL-MCNC: 221 PG/ML
PARATHYROID HORMONE INTACT: 38 PG/ML
PH URINE: 7
PHOSPHATE SERPL-MCNC: 2.9 MG/DL
PLATELET # BLD AUTO: 246 K/UL
POTASSIUM SERPL-SCNC: 4.2 MMOL/L
PROT SERPL-MCNC: 7 G/DL
PROT UR-MCNC: 9 MG/DL
PROTEIN URINE: NEGATIVE
RBC # BLD: 4.65 M/UL
RBC # FLD: 12.9 %
RED BLOOD CELLS URINE: 1 /HPF
RENIN PLASMA: 3.8 PG/ML
RHEUMATOID FACT SER QL: <10 IU/ML
SODIUM SERPL-SCNC: 142 MMOL/L
SPECIFIC GRAVITY URINE: 1.01
SQUAMOUS EPITHELIAL CELLS: 5 /HPF
T3FREE SERPL-MCNC: 2.86 PG/ML
T3RU NFR SERPL: 1.1 TBI
T4 FREE SERPL-MCNC: 1.2 NG/DL
T4 SERPL-MCNC: 8 UG/DL
THYROGLOB AB SERPL-ACNC: <20 IU/ML
THYROPEROXIDASE AB SERPL IA-ACNC: <10 IU/ML
TIBC SERPL-MCNC: 319 UG/DL
TRIGL SERPL-MCNC: 260 MG/DL
TSH SERPL-ACNC: 1.25 UIU/ML
UIBC SERPL-MCNC: 242 UG/DL
URATE SERPL-MCNC: 4.3 MG/DL
UROBILINOGEN URINE: NORMAL
VIT B12 SERPL-MCNC: 497 PG/ML
WBC # FLD AUTO: 8.57 K/UL
WHITE BLOOD CELLS URINE: 2 /HPF

## 2021-11-19 LAB — COLLAGEN CTX SERPL-MCNC: 92 PG/ML

## 2021-11-21 LAB
ALBUMIN MFR SERPL ELPH: 62.6 %
ALBUMIN SERPL-MCNC: 4.4 G/DL
ALBUMIN/GLOB SERPL: 1.7 RATIO
ALP BONE SERPL-MCNC: 11.8 UG/L
ALPHA1 GLOB MFR SERPL ELPH: 4.1 %
ALPHA1 GLOB SERPL ELPH-MCNC: 0.3 G/DL
ALPHA2 GLOB MFR SERPL ELPH: 9.8 %
ALPHA2 GLOB SERPL ELPH-MCNC: 0.7 G/DL
B-GLOBULIN MFR SERPL ELPH: 11.8 %
B-GLOBULIN SERPL ELPH-MCNC: 0.8 G/DL
DEPRECATED KAPPA LC FREE/LAMBDA SER: 1.13 RATIO
GAMMA GLOB FLD ELPH-MCNC: 0.8 G/DL
GAMMA GLOB MFR SERPL ELPH: 11.7 %
IGA SER QL IEP: 187 MG/DL
IGG SER QL IEP: 829 MG/DL
IGM SER QL IEP: 120 MG/DL
INTERPRETATION SERPL IEP-IMP: NORMAL
KAPPA LC CSF-MCNC: 1.81 MG/DL
KAPPA LC SERPL-MCNC: 2.05 MG/DL
M PROTEIN SPEC IFE-MCNC: NORMAL
METHYLMALONATE SERPL-SCNC: 164 NMOL/L
PROT SERPL-MCNC: 7 G/DL
PROT SERPL-MCNC: 7 G/DL

## 2021-12-08 ENCOUNTER — LABORATORY RESULT (OUTPATIENT)
Age: 71
End: 2021-12-08

## 2021-12-08 ENCOUNTER — APPOINTMENT (OUTPATIENT)
Dept: NEPHROLOGY | Facility: CLINIC | Age: 71
End: 2021-12-08
Payer: MEDICARE

## 2021-12-08 VITALS — HEART RATE: 72 BPM | SYSTOLIC BLOOD PRESSURE: 134 MMHG | DIASTOLIC BLOOD PRESSURE: 81 MMHG

## 2021-12-08 VITALS — SYSTOLIC BLOOD PRESSURE: 131 MMHG | HEART RATE: 69 BPM | DIASTOLIC BLOOD PRESSURE: 68 MMHG

## 2021-12-08 VITALS — WEIGHT: 191 LBS | TEMPERATURE: 98.4 F | OXYGEN SATURATION: 98 % | HEART RATE: 84 BPM | BODY MASS INDEX: 34.93 KG/M2

## 2021-12-08 PROCEDURE — 99214 OFFICE O/P EST MOD 30 MIN: CPT | Mod: 25

## 2021-12-08 PROCEDURE — 36415 COLL VENOUS BLD VENIPUNCTURE: CPT

## 2021-12-08 NOTE — HISTORY OF PRESENT ILLNESS
[FreeTextEntry1] : The patient is a 70 year old female presenting today for follow-up evaluation of HTN, DM, and HLD. \par \par The patient is s/p recent hospitalization 12/01/21 at [facility] for cellulitis and abscess of the right hand, for which she was treated with surgical drainage of web abscess and initial vancomycin and Zosyn. She was discharged and transitioned to course of doxycycline and amoxicillin. The patient is feeling generally well today and is much improved from prior. Her wound has been drained and she reports her hand is not as tender as it was before. She is due to f/u with ID doctor in 2 weeks. \par \par Labs \par -12/01/21: l 108, glucose 106, creatinine 0.72\par - 11/15/21: ferritin 152, , HDL 49, LDL 86, creatinine 0.58, eGFR 93, HgbA1c 6.0%, protein/creat ratio 0.3\par \par Current Medications: rosuvastatin 10mg QD, Vitamin D 1000 units QD, metformin 500mg BID, HCTZ 25mg QD, losartan 50mg BID\par - on 3-week course of Augmentin and doxycycline\par

## 2021-12-08 NOTE — PHYSICAL EXAM
[General Appearance - Alert] : alert [General Appearance - In No Acute Distress] : in no acute distress [Sclera] : the sclera and conjunctiva were normal [PERRL With Normal Accommodation] : pupils were equal in size, round, and reactive to light [Extraocular Movements] : extraocular movements were intact [Outer Ear] : the ears and nose were normal in appearance [Hearing Threshold Finger Rub Not [place]] : hearing was normal [Neck Appearance] : the appearance of the neck was normal [Neck Cervical Mass (___cm)] : no neck mass was observed [Jugular Venous Distention Increased] : there was no jugular-venous distention [Thyroid Diffuse Enlargement] : the thyroid was not enlarged [Thyroid Nodule] : there were no palpable thyroid nodules [Auscultation Breath Sounds / Voice Sounds] : lungs were clear to auscultation bilaterally [Heart Rate And Rhythm] : heart rate was normal and rhythm regular [Heart Sounds] : normal S1 and S2 [Heart Sounds Gallop] : no gallops [Murmurs] : no murmurs [Heart Sounds Pericardial Friction Rub] : no pericardial rub [Bowel Sounds] : normal bowel sounds [Abdomen Soft] : soft [Abdomen Tenderness] : non-tender [Abdomen Mass (___ Cm)] : no abdominal mass palpated [No CVA Tenderness] : no ~M costovertebral angle tenderness [No Spinal Tenderness] : no spinal tenderness [Abnormal Walk] : normal gait [Involuntary Movements] : no involuntary movements were seen [Musculoskeletal - Swelling] : no joint swelling seen [Motor Tone] : muscle strength and tone were normal [Skin Color & Pigmentation] : normal skin color and pigmentation [Skin Turgor] : normal skin turgor [] : no rash [No Focal Deficits] : no focal deficits [Oriented To Time, Place, And Person] : oriented to person, place, and time [Impaired Insight] : insight and judgment were intact [Affect] : the affect was normal [FreeTextEntry1] : left hand now with virtually complete resolution of swelling and no difficulty in flexing or extending fingers

## 2021-12-08 NOTE — ASSESSMENT
[FreeTextEntry1] : Plan:\par 1) HTN: BP acceptable today on current regimen. Will therefore continue patient's current antihypertensive medications and will reassess pressure and regimen at next evaluation. \par 2) HLD: lipids found to be well-controlled on rosuvastatin 10mg QD. Due to patient's tolerance of current treatment and acceptable lab results we will not adjust course at this time. Will continue to monitor with lipid profile on blood work today.\par 3) Obesity: Continue metformin 500mg BID. Referred to endocrinologist Dr. Marnie Lyon and advised to make appointment with nutritionist.\par 4) S/p hand cellulitis: Continue f/u with ID doctor. Referred to hand specialist Dr. Lomas. \par \par No changes to medications. \par \par Labs were drawn and patient will return in 3 weeks for a follow-up appointment.

## 2021-12-08 NOTE — ADDENDUM
[FreeTextEntry1] : All medical record entries made by the Scribe were at my, SUN Haro's direction and personally dictated by me on 12/08/2021. I have received the chart and agree that the record accurately reflects my personal performance of the history, physical exam, assessment, and plan. I have also personally directed, reviewed, and agreed with the chart.\par

## 2021-12-08 NOTE — END OF VISIT
[FreeTextEntry3] : Documented by Carmen Salmeron acting as a scribe for SUN Haro on 12/08/2021.\par

## 2021-12-10 LAB
24R-OH-CALCIDIOL SERPL-MCNC: 35.5 PG/ML
25(OH)D3 SERPL-MCNC: 44.4 NG/ML
ALBUMIN SERPL ELPH-MCNC: 4.5 G/DL
ALDOSTERONE SERUM: 5.8 NG/DL
ALP BLD-CCNC: 127 U/L
ALT SERPL-CCNC: 106 U/L
ANION GAP SERPL CALC-SCNC: 10 MMOL/L
APPEARANCE: CLEAR
AST SERPL-CCNC: 86 U/L
BACTERIA: NEGATIVE
BASOPHILS # BLD AUTO: 0.06 K/UL
BASOPHILS NFR BLD AUTO: 0.7 %
BILIRUB SERPL-MCNC: 0.3 MG/DL
BILIRUBIN URINE: NEGATIVE
BLOOD URINE: NEGATIVE
BUN SERPL-MCNC: 14 MG/DL
C3 SERPL-MCNC: 139 MG/DL
C4 SERPL-MCNC: 45 MG/DL
CALCIUM SERPL-MCNC: 10.7 MG/DL
CALCIUM SERPL-MCNC: 10.7 MG/DL
CHLORIDE SERPL-SCNC: 103 MMOL/L
CHOLEST SERPL-MCNC: 150 MG/DL
CO2 SERPL-SCNC: 30 MMOL/L
COLOR: NORMAL
CREAT SERPL-MCNC: 0.7 MG/DL
CREAT SPEC-SCNC: 25 MG/DL
CREAT/PROT UR: 0.4 RATIO
CRP SERPL-MCNC: 10 MG/L
EOSINOPHIL # BLD AUTO: 0.14 K/UL
EOSINOPHIL NFR BLD AUTO: 1.6 %
ERYTHROCYTE [SEDIMENTATION RATE] IN BLOOD BY WESTERGREN METHOD: 36 MM/HR
ESTIMATED AVERAGE GLUCOSE: 123 MG/DL
FERRITIN SERPL-MCNC: 397 NG/ML
FOLATE SERPL-MCNC: 14.8 NG/ML
GLUCOSE QUALITATIVE U: NEGATIVE
GLUCOSE SERPL-MCNC: 95 MG/DL
HBA1C MFR BLD HPLC: 5.9 %
HBV SURFACE AB SER QL: NONREACTIVE
HBV SURFACE AG SER QL: NONREACTIVE
HCT VFR BLD CALC: 40.4 %
HCV AB SER QL: NONREACTIVE
HCV S/CO RATIO: 0.1 S/CO
HCYS SERPL-MCNC: 9.8 UMOL/L
HDLC SERPL-MCNC: 39 MG/DL
HGB BLD-MCNC: 13.5 G/DL
HYALINE CASTS: 2 /LPF
IMM GRANULOCYTES NFR BLD AUTO: 0.6 %
IRON SATN MFR SERPL: 21 %
IRON SERPL-MCNC: 63 UG/DL
KETONES URINE: NEGATIVE
LDLC SERPL CALC-MCNC: 73 MG/DL
LEUKOCYTE ESTERASE URINE: NEGATIVE
LYMPHOCYTES # BLD AUTO: 2.19 K/UL
LYMPHOCYTES NFR BLD AUTO: 25.1 %
MAGNESIUM SERPL-MCNC: 2 MG/DL
MAN DIFF?: NORMAL
MCHC RBC-ENTMCNC: 30.5 PG
MCHC RBC-ENTMCNC: 33.4 GM/DL
MCV RBC AUTO: 91.2 FL
MICROSCOPIC-UA: NORMAL
MONOCYTES # BLD AUTO: 0.57 K/UL
MONOCYTES NFR BLD AUTO: 6.5 %
NEUTROPHILS # BLD AUTO: 5.72 K/UL
NEUTROPHILS NFR BLD AUTO: 65.5 %
NITRITE URINE: NEGATIVE
NONHDLC SERPL-MCNC: 111 MG/DL
PARATHYROID HORMONE INTACT: 19 PG/ML
PH URINE: 8
PHOSPHATE SERPL-MCNC: 3.2 MG/DL
PLATELET # BLD AUTO: 286 K/UL
POTASSIUM SERPL-SCNC: 4.1 MMOL/L
PROCALCITONIN SERPL-MCNC: 0.06 NG/ML
PROT SERPL-MCNC: 6.9 G/DL
PROT UR-MCNC: 10 MG/DL
PROTEIN URINE: NEGATIVE
RBC # BLD: 4.43 M/UL
RBC # FLD: 12.6 %
RED BLOOD CELLS URINE: 1 /HPF
RENIN PLASMA: 5.5 PG/ML
RHEUMATOID FACT SER QL: <10 IU/ML
SODIUM SERPL-SCNC: 143 MMOL/L
SPECIFIC GRAVITY URINE: 1.01
SQUAMOUS EPITHELIAL CELLS: 5 /HPF
T3FREE SERPL-MCNC: 3.11 PG/ML
T3RU NFR SERPL: 1.1 TBI
T4 FREE SERPL-MCNC: 1.3 NG/DL
T4 SERPL-MCNC: 8.9 UG/DL
THYROGLOB AB SERPL-ACNC: <20 IU/ML
THYROPEROXIDASE AB SERPL IA-ACNC: <10 IU/ML
TIBC SERPL-MCNC: 303 UG/DL
TRIGL SERPL-MCNC: 189 MG/DL
TSH SERPL-ACNC: 0.91 UIU/ML
UIBC SERPL-MCNC: 241 UG/DL
URATE SERPL-MCNC: 4.1 MG/DL
UROBILINOGEN URINE: NORMAL
VIT B12 SERPL-MCNC: 745 PG/ML
WBC # FLD AUTO: 8.73 K/UL
WHITE BLOOD CELLS URINE: 1 /HPF

## 2021-12-12 LAB — ANA SER IF-ACNC: NEGATIVE

## 2021-12-13 ENCOUNTER — APPOINTMENT (OUTPATIENT)
Dept: ORTHOPEDIC SURGERY | Facility: CLINIC | Age: 71
End: 2021-12-13
Payer: MEDICARE

## 2021-12-13 VITALS — WEIGHT: 191 LBS | HEIGHT: 62 IN | BODY MASS INDEX: 35.15 KG/M2 | RESPIRATION RATE: 16 BRPM

## 2021-12-13 LAB
ALBUMIN MFR SERPL ELPH: 59.5 %
ALBUMIN SERPL-MCNC: 4.1 G/DL
ALBUMIN/GLOB SERPL: 1.5 RATIO
ALPHA1 GLOB MFR SERPL ELPH: 5.2 %
ALPHA1 GLOB SERPL ELPH-MCNC: 0.4 G/DL
ALPHA2 GLOB MFR SERPL ELPH: 11.5 %
ALPHA2 GLOB SERPL ELPH-MCNC: 0.8 G/DL
B-GLOBULIN MFR SERPL ELPH: 12.3 %
B-GLOBULIN SERPL ELPH-MCNC: 0.8 G/DL
DEPRECATED KAPPA LC FREE/LAMBDA SER: 1.21 RATIO
GAMMA GLOB FLD ELPH-MCNC: 0.8 G/DL
GAMMA GLOB MFR SERPL ELPH: 11.5 %
IGA SER QL IEP: 174 MG/DL
IGG SER QL IEP: 824 MG/DL
IGM SER QL IEP: 113 MG/DL
INTERPRETATION SERPL IEP-IMP: NORMAL
KAPPA LC CSF-MCNC: 1.88 MG/DL
KAPPA LC SERPL-MCNC: 2.28 MG/DL
M PROTEIN SPEC IFE-MCNC: NORMAL
MPO AB + PR3 PNL SER: NORMAL
PROT SERPL-MCNC: 6.9 G/DL
PROT SERPL-MCNC: 6.9 G/DL

## 2021-12-13 PROCEDURE — 73130 X-RAY EXAM OF HAND: CPT | Mod: 50

## 2021-12-13 PROCEDURE — 99203 OFFICE O/P NEW LOW 30 MIN: CPT

## 2021-12-14 LAB — ALP BONE SERPL-MCNC: 14 UG/L

## 2021-12-15 LAB
COLLAGEN CTX SERPL-MCNC: 83 PG/ML
METHYLMALONATE SERPL-SCNC: 187 NMOL/L

## 2022-01-04 ENCOUNTER — RX RENEWAL (OUTPATIENT)
Age: 72
End: 2022-01-04

## 2022-01-05 ENCOUNTER — RX RENEWAL (OUTPATIENT)
Age: 72
End: 2022-01-05

## 2022-01-25 ENCOUNTER — APPOINTMENT (OUTPATIENT)
Dept: ENDOCRINOLOGY | Facility: CLINIC | Age: 72
End: 2022-01-25

## 2022-03-22 ENCOUNTER — APPOINTMENT (OUTPATIENT)
Dept: NEPHROLOGY | Facility: CLINIC | Age: 72
End: 2022-03-22
Payer: MEDICARE

## 2022-03-22 ENCOUNTER — LABORATORY RESULT (OUTPATIENT)
Age: 72
End: 2022-03-22

## 2022-03-22 VITALS — SYSTOLIC BLOOD PRESSURE: 110 MMHG | DIASTOLIC BLOOD PRESSURE: 70 MMHG

## 2022-03-22 VITALS — BODY MASS INDEX: 34.2 KG/M2 | DIASTOLIC BLOOD PRESSURE: 76 MMHG | SYSTOLIC BLOOD PRESSURE: 136 MMHG | WEIGHT: 187 LBS

## 2022-03-22 PROCEDURE — 36415 COLL VENOUS BLD VENIPUNCTURE: CPT

## 2022-03-22 PROCEDURE — 99214 OFFICE O/P EST MOD 30 MIN: CPT | Mod: CS,25

## 2022-03-22 NOTE — END OF VISIT
[Time Spent: ___ minutes] : I have spent [unfilled] minutes of time on the encounter. [FreeTextEntry3] :  Documented by Charanjit Simmons acting as a scribe for Dr. Jose Ramon Ramires on 03/22/2022.

## 2022-03-22 NOTE — PHYSICAL EXAM
[General Appearance - Alert] : alert [General Appearance - In No Acute Distress] : in no acute distress [Sclera] : the sclera and conjunctiva were normal [Outer Ear] : the ears and nose were normal in appearance [Neck Appearance] : the appearance of the neck was normal [Hearing Threshold Finger Rub Not Bee] : hearing was normal [Neck Cervical Mass (___cm)] : no neck mass was observed [Jugular Venous Distention Increased] : there was no jugular-venous distention [Thyroid Nodule] : there were no palpable thyroid nodules [Thyroid Diffuse Enlargement] : the thyroid was not enlarged [Auscultation Breath Sounds / Voice Sounds] : lungs were clear to auscultation bilaterally [Heart Rate And Rhythm] : heart rate was normal and rhythm regular [Heart Sounds] : normal S1 and S2 [Heart Sounds Gallop] : no gallops [Murmurs] : no murmurs [Heart Sounds Pericardial Friction Rub] : no pericardial rub [Edema] : there was no peripheral edema [Bowel Sounds] : normal bowel sounds [Abdomen Soft] : soft [Abdomen Tenderness] : non-tender [Abdomen Mass (___ Cm)] : no abdominal mass palpated [No CVA Tenderness] : no ~M costovertebral angle tenderness [No Spinal Tenderness] : no spinal tenderness [Abnormal Walk] : normal gait [Involuntary Movements] : no involuntary movements were seen [Musculoskeletal - Swelling] : no joint swelling seen [Motor Tone] : muscle strength and tone were normal [Skin Color & Pigmentation] : normal skin color and pigmentation [Skin Turgor] : normal skin turgor [] : no rash [No Focal Deficits] : no focal deficits [Oriented To Time, Place, And Person] : oriented to person, place, and time [Affect] : the affect was normal [Impaired Insight] : insight and judgment were intact

## 2022-03-22 NOTE — HISTORY OF PRESENT ILLNESS
[FreeTextEntry1] : The patient is a 71 year old female presenting today for follow-up evaluation of HTN, DM, and HLD(Last seen here 12/08/2021). \par \par The patient states since covid infection ( Dec 2021), she has had complicated medical issues which include; appetite changes due to dysphagia, causing her to eat a lot of carbs. She has been unable to maintain her weight.She reportedly had mild pneumonia, and sinusitis. She has also had UTI. She reports chronic pain in her Rt arm, shoulder ,and neck occasional weakness which has affected the quality of life leaving her incapable of taking showers alone, getting dressed,  and other daily activities. Her shoulder pain is worse when she lays down but she has normal ROM. She experiences severe stiff neck . She reports BL knee pain which she was reportedly told needs surgery. She takes Advil for her pain. She is considering quitting her job due to her debilitating pain. She reports occasional dyspepsia which started a month ago.\par \par -Covid infection treated with monoclonal antibodies( Dec 2021)\par Recent f/u with Dr. Piedra\par \par Labs 12/08/2021:\par ESR 36, Ferritin 397, Ca 10.7, Triglycerides 189, HDL 39, Creatinine 0.70, eGFR 102, HBA1C 5.9%, P/Cr 0.4\par \par Current Medications: rosuvastatin 10mg QD, Vitamin D 1000 units QD, metformin 500mg BID, HCTZ 25mg QD, losartan 50mg BID\par

## 2022-03-22 NOTE — ASSESSMENT
[FreeTextEntry1] : Plan:\par 1) HTN:  BP acceptable today on current regimen and therefore will not adjust patient's antihypertensive medications, will reassess pressure and regimen at next evaluation. \par 2) HLD:Triglycerides of 189, HDL 39 lipids on rosuvastatin 10mg QD. Due to patient's tolerance of current treatment and acceptable lab results we will not adjust course at this time. Will continue to monitor with lipid profile on blood work today.\par 3) Obesity: Advised patient to cut down on carbs and reduce portions of meal in order to achieve her desired weight. Continue metformin 500mg BID. Advised patient that pending lab, she might be a candidate for Trulicity. Will plan to discuss with endocrinologist or nutritionist pending labs.\par 4) Neck and Shoulder Pain: Will order MRI C-spine , and MRI of  Rt. shoulder\par 5) BL Knee pain: Referred patient to Dr. Brantley for consult.\par 6) Fungal infection after antibiotics: Prescribed Fluconazole 150mg x2 QW.\par 7) Informed patient to monitor her episodes of dyspepsia and pending her report will evaluate the need for GI consult.\par \par Changes to Medications: Fluconazole 150mg x2 QW.\par \par Labs were drawn and patient will return in 1 month for a follow-up appointment. \par

## 2022-03-22 NOTE — ADDENDUM
[FreeTextEntry1] : All medical record entries made by the Scribe were at my, Dr. Jose Ramon Ramires, direction and personally dictated by me on 03/22/2022.  I have reviewed the chart and agree that the record accurately reflects my personal performance of the history, physical exam, assessment and plan. I have also personally directed, reviewed, and agreed with the chart.

## 2022-03-27 LAB
24R-OH-CALCIDIOL SERPL-MCNC: 48.7 PG/ML
25(OH)D3 SERPL-MCNC: 48 NG/ML
ALBUMIN MFR SERPL ELPH: 64.5 %
ALBUMIN SERPL ELPH-MCNC: 4.6 G/DL
ALBUMIN SERPL-MCNC: 4.6 G/DL
ALBUMIN/GLOB SERPL: 1.8 RATIO
ALDOSTERONE SERUM: 7.7 NG/DL
ALP BLD-CCNC: 75 U/L
ALP BONE SERPL-MCNC: 12.1 UG/L
ALPHA1 GLOB MFR SERPL ELPH: 3.6 %
ALPHA1 GLOB SERPL ELPH-MCNC: 0.3 G/DL
ALPHA2 GLOB MFR SERPL ELPH: 11.2 %
ALPHA2 GLOB SERPL ELPH-MCNC: 0.8 G/DL
ALT SERPL-CCNC: 17 U/L
ANA SER IF-ACNC: NEGATIVE
ANION GAP SERPL CALC-SCNC: 15 MMOL/L
APPEARANCE: CLEAR
AST SERPL-CCNC: 25 U/L
B-GLOBULIN MFR SERPL ELPH: 12 %
B-GLOBULIN SERPL ELPH-MCNC: 0.9 G/DL
BACTERIA: NEGATIVE
BASOPHILS # BLD AUTO: 0.04 K/UL
BASOPHILS NFR BLD AUTO: 0.4 %
BILIRUB SERPL-MCNC: 0.5 MG/DL
BILIRUBIN URINE: NEGATIVE
BLOOD URINE: NEGATIVE
BUN SERPL-MCNC: 17 MG/DL
C3 SERPL-MCNC: 152 MG/DL
C4 SERPL-MCNC: 48 MG/DL
CALCIUM SERPL-MCNC: 10.5 MG/DL
CALCIUM SERPL-MCNC: 10.5 MG/DL
CHLORIDE SERPL-SCNC: 100 MMOL/L
CHOLEST SERPL-MCNC: 174 MG/DL
CO2 SERPL-SCNC: 25 MMOL/L
COLOR: NORMAL
CREAT SERPL-MCNC: 0.64 MG/DL
CREAT SPEC-SCNC: 39 MG/DL
CREAT/PROT UR: 0.3 RATIO
CRP SERPL-MCNC: 4 MG/L
DEPRECATED KAPPA LC FREE/LAMBDA SER: 0.87 RATIO
EGFR: 94 ML/MIN/1.73M2
EOSINOPHIL # BLD AUTO: 0.04 K/UL
EOSINOPHIL NFR BLD AUTO: 0.4 %
ERYTHROCYTE [SEDIMENTATION RATE] IN BLOOD BY WESTERGREN METHOD: 32 MM/HR
ESTIMATED AVERAGE GLUCOSE: 126 MG/DL
FERRITIN SERPL-MCNC: 165 NG/ML
FOLATE SERPL-MCNC: 18 NG/ML
GAMMA GLOB FLD ELPH-MCNC: 0.6 G/DL
GAMMA GLOB MFR SERPL ELPH: 8.7 %
GLUCOSE QUALITATIVE U: NEGATIVE
GLUCOSE SERPL-MCNC: 101 MG/DL
HBA1C MFR BLD HPLC: 6 %
HBV SURFACE AB SER QL: NONREACTIVE
HBV SURFACE AG SER QL: NONREACTIVE
HCT VFR BLD CALC: 41.4 %
HCV AB SER QL: NONREACTIVE
HCV S/CO RATIO: 0.09 S/CO
HCYS SERPL-MCNC: 10 UMOL/L
HDLC SERPL-MCNC: 48 MG/DL
HGB BLD-MCNC: 14 G/DL
HYALINE CASTS: 0 /LPF
IGA SER QL IEP: 219 MG/DL
IGG SER QL IEP: 879 MG/DL
IGM SER QL IEP: 133 MG/DL
IMM GRANULOCYTES NFR BLD AUTO: 0.4 %
INTERPRETATION SERPL IEP-IMP: NORMAL
IRON SATN MFR SERPL: 16 %
IRON SERPL-MCNC: 54 UG/DL
KAPPA LC CSF-MCNC: 2.69 MG/DL
KAPPA LC SERPL-MCNC: 2.35 MG/DL
KETONES URINE: NEGATIVE
LDLC SERPL CALC-MCNC: 92 MG/DL
LEUKOCYTE ESTERASE URINE: ABNORMAL
LYMPHOCYTES # BLD AUTO: 2.55 K/UL
LYMPHOCYTES NFR BLD AUTO: 24.6 %
M PROTEIN SPEC IFE-MCNC: NORMAL
MAGNESIUM SERPL-MCNC: 1.9 MG/DL
MAN DIFF?: NORMAL
MCHC RBC-ENTMCNC: 30.4 PG
MCHC RBC-ENTMCNC: 33.8 GM/DL
MCV RBC AUTO: 90 FL
METHYLMALONATE SERPL-SCNC: 171 NMOL/L
MICROSCOPIC-UA: NORMAL
MONOCYTES # BLD AUTO: 0.71 K/UL
MONOCYTES NFR BLD AUTO: 6.9 %
NEUTROPHILS # BLD AUTO: 6.98 K/UL
NEUTROPHILS NFR BLD AUTO: 67.3 %
NITRITE URINE: NEGATIVE
NONHDLC SERPL-MCNC: 126 MG/DL
PARATHYROID HORMONE INTACT: 23 PG/ML
PH URINE: 7
PHOSPHATE SERPL-MCNC: 3.9 MG/DL
PLATELET # BLD AUTO: 245 K/UL
POTASSIUM SERPL-SCNC: 4.3 MMOL/L
PROT SERPL-MCNC: 7.2 G/DL
PROT UR-MCNC: 11 MG/DL
PROTEIN URINE: NEGATIVE
RBC # BLD: 4.6 M/UL
RBC # FLD: 13 %
RED BLOOD CELLS URINE: 5 /HPF
RENIN PLASMA: 5.4 PG/ML
RHEUMATOID FACT SER QL: <10 IU/ML
SODIUM SERPL-SCNC: 140 MMOL/L
SPECIFIC GRAVITY URINE: 1.01
SQUAMOUS EPITHELIAL CELLS: 3 /HPF
T3FREE SERPL-MCNC: 3.49 PG/ML
T3RU NFR SERPL: 1.1 TBI
T4 FREE SERPL-MCNC: 1.4 NG/DL
T4 SERPL-MCNC: 9.8 UG/DL
THYROGLOB AB SERPL-ACNC: <20 IU/ML
THYROPEROXIDASE AB SERPL IA-ACNC: <10 IU/ML
TIBC SERPL-MCNC: 341 UG/DL
TRIGL SERPL-MCNC: 169 MG/DL
TSH SERPL-ACNC: 1.33 UIU/ML
UIBC SERPL-MCNC: 287 UG/DL
URATE SERPL-MCNC: 4.3 MG/DL
UROBILINOGEN URINE: NORMAL
VIT B12 SERPL-MCNC: 608 PG/ML
WBC # FLD AUTO: 10.36 K/UL
WHITE BLOOD CELLS URINE: 49 /HPF

## 2022-04-01 LAB — COLLAGEN CTX SERPL-MCNC: 130 PG/ML

## 2022-07-18 ENCOUNTER — APPOINTMENT (OUTPATIENT)
Dept: HEART AND VASCULAR | Facility: CLINIC | Age: 72
End: 2022-07-18

## 2022-08-02 ENCOUNTER — APPOINTMENT (OUTPATIENT)
Dept: NEPHROLOGY | Facility: CLINIC | Age: 72
End: 2022-08-02

## 2022-08-02 VITALS
RESPIRATION RATE: 19 BRPM | OXYGEN SATURATION: 99 % | BODY MASS INDEX: 34.27 KG/M2 | TEMPERATURE: 98.6 F | WEIGHT: 187.39 LBS | HEART RATE: 67 BPM

## 2022-08-02 DIAGNOSIS — U07.1 COVID-19: ICD-10-CM

## 2022-08-02 PROCEDURE — 99214 OFFICE O/P EST MOD 30 MIN: CPT | Mod: CS

## 2022-08-03 PROBLEM — U07.1 COVID-19 VIRUS INFECTION: Status: ACTIVE | Noted: 2020-05-18

## 2022-08-09 ENCOUNTER — APPOINTMENT (OUTPATIENT)
Dept: ORTHOPEDIC SURGERY | Facility: CLINIC | Age: 72
End: 2022-08-09

## 2022-08-09 ENCOUNTER — OUTPATIENT (OUTPATIENT)
Dept: OUTPATIENT SERVICES | Facility: HOSPITAL | Age: 72
LOS: 1 days | End: 2022-08-09
Payer: MEDICARE

## 2022-08-09 ENCOUNTER — APPOINTMENT (OUTPATIENT)
Dept: NEPHROLOGY | Facility: CLINIC | Age: 72
End: 2022-08-09

## 2022-08-09 ENCOUNTER — RESULT REVIEW (OUTPATIENT)
Age: 72
End: 2022-08-09

## 2022-08-09 VITALS — WEIGHT: 186 LBS | OXYGEN SATURATION: 98 % | TEMPERATURE: 99 F | HEART RATE: 79 BPM | BODY MASS INDEX: 34.02 KG/M2

## 2022-08-09 VITALS — HEART RATE: 73 BPM | DIASTOLIC BLOOD PRESSURE: 77 MMHG | SYSTOLIC BLOOD PRESSURE: 149 MMHG

## 2022-08-09 PROCEDURE — 73564 X-RAY EXAM KNEE 4 OR MORE: CPT | Mod: 26,50

## 2022-08-09 PROCEDURE — 99214 OFFICE O/P EST MOD 30 MIN: CPT

## 2022-08-09 PROCEDURE — 73564 X-RAY EXAM KNEE 4 OR MORE: CPT

## 2022-08-09 PROCEDURE — 72170 X-RAY EXAM OF PELVIS: CPT | Mod: 26

## 2022-08-09 PROCEDURE — 72170 X-RAY EXAM OF PELVIS: CPT

## 2022-08-09 RX ORDER — 1.1% SODIUM FLUORIDE PRESCRIPTION DENTAL CREAM 5 MG/G
1.1 CREAM DENTAL
Qty: 51 | Refills: 0 | Status: ACTIVE | COMMUNITY
Start: 2022-02-03

## 2022-08-09 NOTE — PHYSICAL EXAM
[de-identified] : General appearance: well nourished and hydrated, pleasant, alert and oriented x 3, cooperative.  \par HEENT: normocephalic, EOM intact, wearing mask, external auditory canal clear.  \par Cardiovascular: no lower leg edema, no varicosities, dorsalis pedis pulses palpable and symmetric.  \par Lymphatics: no palpable lymphadenopathy, no lymphedema.  \par Neurologic: sensation is normal, no muscle weakness in upper or lower extremities, patella tendon reflexes present and symmetric.  \par Dermatologic: skin moist, warm, no rash.  \par Spine: cervical spine with normal lordosis and painless range of motion, thoracic spine with normal kyphosis and painless range of motion, lumbosacral spine with normal lordosis and painless range of motion.\par Gait: slow to stand and get started. Mild left varus thrust.\par \par Left knee: all fields negative/normal/unremarkable unless otherwise noted -- \par - Focal soft tissue swelling: \par - Ecchymosis: \par - Erythema: \par - Effusion: small\par - Wounds: \par - Alignment: varus \par - Tenderness: medial/ lateral joint line TTP\par - ROM: 3-130\par - Collateral laxity: \par - Cruciate laxity: \par - Popliteal angle (degrees): \par - Quad strength: \par \par Right knee: all fields negative/normal/unremarkable unless otherwise noted --\par - Focal soft tissue swelling: \par - Ecchymosis: \par - Erythema: \par - Effusion: small \par - Wounds: \par - Alignment: \par - Tenderness: \par - ROM: 3-130\par - Collateral laxity: stable \par - Cruciate laxity:  stable\par - Popliteal angle (degrees): \par - Quad strength:  [de-identified] : AP pelvis and 4 views of the bilateral knees (weightbearing AP, weightbearing Petersen, weightbearing lateral, and Sunrise) were interpreted by me and reviewed with the patient.\par \par Location of imaging: [facility]\par Date of exam: 8/9/22\par \par Pelvic alignment: normal\par \par Right hip -- \par Arthritis: none\par Deformity: none\par Osteonecrosis: none\par \par Left hip -- \par Arthritis: none\par Deformity: none\par Osteonecrosis: none\par \par Right knee -- \par Alignment: varus\par Arthritis: tricompartmental worst medial and patellofemoral, KL4\par Patellar height: normal\par Patellar tracking: central\par \par Left knee -- \par Alignment: varus\par Arthritis: tricompartmental worst medial, KL4\par Patellar height: normal\par Patellar tracking: central

## 2022-08-09 NOTE — CONSULT LETTER
[Dear  ___] : Dear  [unfilled], [Consult Letter:] : I had the pleasure of evaluating your patient, [unfilled]. [Please see my note below.] : Please see my note below. [Consult Closing:] : Thank you very much for allowing me to participate in the care of this patient.  If you have any questions, please do not hesitate to contact me. [Sincerely,] : Sincerely, [FreeTextEntry3] : Gil Brantley MD\par Orthopaedic Surgery - Adult Reconstruction\par [facility] [place]\par [address]\[place] [address]\par p. [phone]\par f. [phone]

## 2022-08-09 NOTE — HISTORY OF PRESENT ILLNESS
[de-identified] : 8/9/22: 70y/o female accompanied by her , ref by Dr. Ramires for bilateral knee osteoarthritis. States that symptoms somewhat migratory but currently with pain L>R.  Modifying factors include ambulating up steps and standing for extended periods of time. She also endorses discomfort with walking. She has participated in PT and states it has been somewhat beneficial.  Reports she is unable to visit certain family members due the amount of steps in their home. Patient is employed as as . She is here to discuss TKA.\par \par PMH: HTN, DM (well controlled), HLD.

## 2022-08-09 NOTE — DISCUSSION/SUMMARY
[de-identified] : 72 y/o female with bilateral knee osteoarthritis L>R\par - Patient is indicated for left total knee arthroplasty with Malinda robotic assistance\par - We discussed the details of the procedure, the expected recovery period, and the expected outcome. We discussed the likelihood of satisfaction after complete recovery, and the potential causes of dissatisfaction. The importance of active patient participation in the rehabilitation protocol was emphasized, along with its influence on short and long-term outcomes. Specific risks of total knee replacement were discussed in detail. We discussed the risk of surgical site complications including but not limited to: surgical site infection, wound healing complications, bone fracture, tendon or ligament injury, neurovascular injury, hemorrhage, postoperative stiffness or instability, persistent pain and need for reoperation or manipulation under anesthesia. We discussed surgical blood loss and the possible need for blood transfusion. We discussed the risk of perioperative medical complications, including but not limited to catheter-associated urinary tract infection, venous thromboembolism and other cardiopulmonary complications. We discussed anesthetic options and the risk of anesthesia-related complications. We discussed implant fixation methods; my plan would be to use fully cemented fixation in this case. We discussed the variable need to resurface the patella; my plan would be to resurface in this case. We discussed the durability of prosthetic knees and limitations related to wear, osteolysis and loosening.  We discussed the role of the robot in helping to guide bone resections and measure alignment and soft tissue balance. All questions were answered the patient's satisfaction. The patient was given a copy of my preoperative packet with additional information about the procedure. I asked the patient to either call back or schedule a followup appointment for any additional questions or concerns regarding the procedure.\par - Standard pre op medical clearance through Dr. Ramires.\par - Schedule surgery per convenience . She would like to move forward as soon as possible. \par - We discussed that right TKA may be considered once she has adequately recovered from the L TKA

## 2022-08-18 RX ORDER — ACETAMINOPHEN 500 MG/1
500 TABLET ORAL
Qty: 180 | Refills: 1 | Status: ACTIVE | COMMUNITY
Start: 2022-08-18 | End: 1900-01-01

## 2022-08-19 VITALS
RESPIRATION RATE: 16 BRPM | WEIGHT: 188.27 LBS | DIASTOLIC BLOOD PRESSURE: 85 MMHG | SYSTOLIC BLOOD PRESSURE: 142 MMHG | HEART RATE: 83 BPM | HEIGHT: 61 IN | OXYGEN SATURATION: 96 % | TEMPERATURE: 98 F

## 2022-08-19 NOTE — H&P ADULT - ATTENDING COMMENTS
Dr. Ramires called yesterday morning to inform me that the patient had developed symptoms of bacterial cystitis/UTI on the evening of 8/20/22, for which Bactrim was started. Given that the course of treatment would not be complete by the time of the planned surgery, the procedure was delayed to the following week. Therefore, this surgical admission was postponed to next Monday, 8/29/22.

## 2022-08-19 NOTE — H&P ADULT - NSHPLABSRESULTS_GEN_ALL_CORE
Preop CBC, BMP, PT/INR, PTT within normal range  Cr 0.7  UA negative   Nasal Swab   T + S  Preop CXR within normal limits, reviewed per medical clearance   Preop EKG RBBB and reviewed per medical clearance Preop CBC, BMP, PT/INR, PTT within normal range  Cr 0.7  Nasal Swab   T + S  Preop CXR within normal limits, reviewed per medical clearance   Preop EKG RBBB and reviewed per medical clearance  ECHO March 2022: LVEF 75%  A1c: 6.0 Preop CBC, BMP, PT/INR, PTT within normal range  Cr 0.7  Nasal Swab day of surgery   Preop CXR within normal limits, reviewed per medical clearance   Preop EKG = RBBB and reviewed per medical clearance  ECHO March 2022: LVEF 75%  A1c: 6.0  covid pcr negative on 8/26/22

## 2022-08-19 NOTE — H&P ADULT - NSHPPHYSICALEXAM_GEN_ALL_CORE
Gen: 70 y/o female, NAD  MSK: decreased ROM at the left knee 2/2 pain     Remainder of exam per med clearance note Gen: 72 y/o female, NAD  MSK: Skin warm and well perfused. DP pulse palpable left lower extremity.  Sensation intact and equal bilateral lower extremities. EHL/TA/GS/FHL 5/5 bilateral lower extremities. Decreased ROM at the left knee 2/2 pain.   Remainder of exam per med clearance note

## 2022-08-19 NOTE — PATIENT PROFILE ADULT - FALL HARM RISK - HARM RISK INTERVENTIONS

## 2022-08-19 NOTE — H&P ADULT - HISTORY OF PRESENT ILLNESS
Patient is 72 y/o female with c/o left knee pain x  Patient elects to undergo left TKA with Dr. Brantley  Patient is 72 y/o female with c/o left knee pain x    Patient elects to undergo left TKA with Dr. Brantley  Patient is 70 y/o female with c/o left knee pain x 10 years, worsening over the last 5 years, without preceding accident, injury or trauma. Failed conservative therapies for her symptoms including oral pain control with Advil. States she had  "superficial blood clot" during pregnancy which resolved without intervention. Of note patient was recently diagnosed with a UTI, finished a 5 day course of bactrim on 8/25/22.   Patient elects to undergo left TKA with Dr. Brantley.

## 2022-08-19 NOTE — H&P ADULT - PROBLEM SELECTOR PLAN 1
Admit to Orthopedic Service   For elective Left TKA with Dr. Brantley   Medically cleared and optimized for surgery by Dr. Mendosa

## 2022-08-19 NOTE — PATIENT PROFILE ADULT - NSPROEXTENSIONSOFSELF_GEN_A_NUR
Propranolol Counseling:  I discussed with the patient the risks of propranolol including but not limited to low heart rate, low blood pressure, low blood sugar, restlessness and increased cold sensitivity. They should call the office if they experience any of these side effects. eyeglasses

## 2022-08-22 ENCOUNTER — APPOINTMENT (OUTPATIENT)
Dept: ORTHOPEDIC SURGERY | Facility: HOSPITAL | Age: 72
End: 2022-08-22

## 2022-08-26 RX ORDER — FLUTICASONE PROPIONATE 50 MCG
1 SPRAY, SUSPENSION NASAL
Qty: 0 | Refills: 0 | DISCHARGE

## 2022-08-26 RX ORDER — POVIDONE-IODINE 5 %
1 AEROSOL (ML) TOPICAL ONCE
Refills: 0 | Status: COMPLETED | OUTPATIENT
Start: 2022-08-29 | End: 2022-08-29

## 2022-08-28 ENCOUNTER — TRANSCRIPTION ENCOUNTER (OUTPATIENT)
Age: 72
End: 2022-08-28

## 2022-08-29 ENCOUNTER — APPOINTMENT (OUTPATIENT)
Dept: ORTHOPEDIC SURGERY | Facility: HOSPITAL | Age: 72
End: 2022-08-29

## 2022-08-29 ENCOUNTER — INPATIENT (INPATIENT)
Facility: HOSPITAL | Age: 72
LOS: 0 days | Discharge: HOME HEALTH | DRG: 470 | End: 2022-08-30
Attending: ORTHOPAEDIC SURGERY | Admitting: ORTHOPAEDIC SURGERY
Payer: COMMERCIAL

## 2022-08-29 DIAGNOSIS — M17.12 UNILATERAL PRIMARY OSTEOARTHRITIS, LEFT KNEE: ICD-10-CM

## 2022-08-29 DIAGNOSIS — E78.5 HYPERLIPIDEMIA, UNSPECIFIED: ICD-10-CM

## 2022-08-29 DIAGNOSIS — Z98.890 OTHER SPECIFIED POSTPROCEDURAL STATES: Chronic | ICD-10-CM

## 2022-08-29 DIAGNOSIS — I10 ESSENTIAL (PRIMARY) HYPERTENSION: ICD-10-CM

## 2022-08-29 LAB
APPEARANCE UR: CLEAR — SIGNIFICANT CHANGE UP
BACTERIA # UR AUTO: PRESENT /HPF
BILIRUB UR-MCNC: NEGATIVE — SIGNIFICANT CHANGE UP
COLOR SPEC: YELLOW — SIGNIFICANT CHANGE UP
DIFF PNL FLD: ABNORMAL
EPI CELLS # UR: SIGNIFICANT CHANGE UP /HPF (ref 0–5)
GLUCOSE BLDC GLUCOMTR-MCNC: 121 MG/DL — HIGH (ref 70–99)
GLUCOSE BLDC GLUCOMTR-MCNC: 225 MG/DL — HIGH (ref 70–99)
GLUCOSE BLDC GLUCOMTR-MCNC: 324 MG/DL — HIGH (ref 70–99)
GLUCOSE UR QL: NEGATIVE — SIGNIFICANT CHANGE UP
KETONES UR-MCNC: NEGATIVE — SIGNIFICANT CHANGE UP
LEUKOCYTE ESTERASE UR-ACNC: ABNORMAL
NITRITE UR-MCNC: NEGATIVE — SIGNIFICANT CHANGE UP
PH UR: 6 — SIGNIFICANT CHANGE UP (ref 5–8)
PROT UR-MCNC: NEGATIVE MG/DL — SIGNIFICANT CHANGE UP
RBC CASTS # UR COMP ASSIST: < 5 /HPF — SIGNIFICANT CHANGE UP
SP GR SPEC: 1.01 — SIGNIFICANT CHANGE UP (ref 1–1.03)
UROBILINOGEN FLD QL: 0.2 E.U./DL — SIGNIFICANT CHANGE UP
WBC UR QL: ABNORMAL /HPF

## 2022-08-29 PROCEDURE — 27447 TOTAL KNEE ARTHROPLASTY: CPT | Mod: LT

## 2022-08-29 PROCEDURE — 73560 X-RAY EXAM OF KNEE 1 OR 2: CPT | Mod: 26,LT

## 2022-08-29 PROCEDURE — S2900 ROBOTIC SURGICAL SYSTEM: CPT | Mod: NC

## 2022-08-29 DEVICE — FEM PERSONA PS CMT CCR NRW L SZ 7: Type: IMPLANTABLE DEVICE | Site: LEFT | Status: FUNCTIONAL

## 2022-08-29 DEVICE — PATELLA VE 29MM: Type: IMPLANTABLE DEVICE | Site: LEFT | Status: FUNCTIONAL

## 2022-08-29 DEVICE — ZIMMER/NEXGEN SMOOTH PIN 3.2X75MM: Type: IMPLANTABLE DEVICE | Site: LEFT | Status: FUNCTIONAL

## 2022-08-29 DEVICE — SURF ART PERSONA LT 6-9 CD 13MM: Type: IMPLANTABLE DEVICE | Site: LEFT | Status: FUNCTIONAL

## 2022-08-29 DEVICE — PIN CAS FIX 3.2X150MM: Type: IMPLANTABLE DEVICE | Site: LEFT | Status: FUNCTIONAL

## 2022-08-29 DEVICE — STEM PSN TIB CMT SZ CL 5 DEG: Type: IMPLANTABLE DEVICE | Site: LEFT | Status: FUNCTIONAL

## 2022-08-29 DEVICE — ZIMMER/NEXGEN HEX HEAD SCREW 3.5MM: Type: IMPLANTABLE DEVICE | Site: LEFT | Status: FUNCTIONAL

## 2022-08-29 DEVICE — ZIMMER FEMALE HEX SCREW MAGNETIC 2.5MM X 25MM: Type: IMPLANTABLE DEVICE | Site: LEFT | Status: FUNCTIONAL

## 2022-08-29 DEVICE — CEMENT PALACOS R: Type: IMPLANTABLE DEVICE | Site: LEFT | Status: FUNCTIONAL

## 2022-08-29 DEVICE — PIN FIX CAS 3.2X80MM STR: Type: IMPLANTABLE DEVICE | Site: LEFT | Status: FUNCTIONAL

## 2022-08-29 RX ORDER — FOLIC ACID 0.8 MG
1 TABLET ORAL DAILY
Refills: 0 | Status: DISCONTINUED | OUTPATIENT
Start: 2022-08-29 | End: 2022-08-30

## 2022-08-29 RX ORDER — DEXTROSE 50 % IN WATER 50 %
25 SYRINGE (ML) INTRAVENOUS ONCE
Refills: 0 | Status: DISCONTINUED | OUTPATIENT
Start: 2022-08-29 | End: 2022-08-30

## 2022-08-29 RX ORDER — HYDROMORPHONE HYDROCHLORIDE 2 MG/ML
0.5 INJECTION INTRAMUSCULAR; INTRAVENOUS; SUBCUTANEOUS EVERY 4 HOURS
Refills: 0 | Status: DISCONTINUED | OUTPATIENT
Start: 2022-08-29 | End: 2022-08-30

## 2022-08-29 RX ORDER — METFORMIN HYDROCHLORIDE 850 MG/1
0 TABLET ORAL
Qty: 0 | Refills: 0 | DISCHARGE

## 2022-08-29 RX ORDER — DEXTROSE 50 % IN WATER 50 %
15 SYRINGE (ML) INTRAVENOUS ONCE
Refills: 0 | Status: DISCONTINUED | OUTPATIENT
Start: 2022-08-29 | End: 2022-08-30

## 2022-08-29 RX ORDER — CELECOXIB 200 MG/1
200 CAPSULE ORAL ONCE
Refills: 0 | Status: COMPLETED | OUTPATIENT
Start: 2022-08-29 | End: 2022-08-29

## 2022-08-29 RX ORDER — LANOLIN ALCOHOL/MO/W.PET/CERES
5 CREAM (GRAM) TOPICAL AT BEDTIME
Refills: 0 | Status: DISCONTINUED | OUTPATIENT
Start: 2022-08-29 | End: 2022-08-30

## 2022-08-29 RX ORDER — POLYETHYLENE GLYCOL 3350 17 G/17G
17 POWDER, FOR SOLUTION ORAL AT BEDTIME
Refills: 0 | Status: DISCONTINUED | OUTPATIENT
Start: 2022-08-29 | End: 2022-08-30

## 2022-08-29 RX ORDER — GLUCAGON INJECTION, SOLUTION 0.5 MG/.1ML
1 INJECTION, SOLUTION SUBCUTANEOUS ONCE
Refills: 0 | Status: DISCONTINUED | OUTPATIENT
Start: 2022-08-29 | End: 2022-08-30

## 2022-08-29 RX ORDER — HYDROMORPHONE HYDROCHLORIDE 2 MG/ML
0.5 INJECTION INTRAMUSCULAR; INTRAVENOUS; SUBCUTANEOUS
Refills: 0 | Status: DISCONTINUED | OUTPATIENT
Start: 2022-08-29 | End: 2022-08-30

## 2022-08-29 RX ORDER — INSULIN LISPRO 100/ML
VIAL (ML) SUBCUTANEOUS
Refills: 0 | Status: DISCONTINUED | OUTPATIENT
Start: 2022-08-29 | End: 2022-08-30

## 2022-08-29 RX ORDER — CHOLECALCIFEROL (VITAMIN D3) 125 MCG
1 CAPSULE ORAL
Qty: 0 | Refills: 0 | DISCHARGE

## 2022-08-29 RX ORDER — SODIUM CHLORIDE 9 MG/ML
1000 INJECTION, SOLUTION INTRAVENOUS
Refills: 0 | Status: DISCONTINUED | OUTPATIENT
Start: 2022-08-29 | End: 2022-08-30

## 2022-08-29 RX ORDER — OXYCODONE HYDROCHLORIDE 5 MG/1
10 TABLET ORAL EVERY 4 HOURS
Refills: 0 | Status: DISCONTINUED | OUTPATIENT
Start: 2022-08-29 | End: 2022-08-30

## 2022-08-29 RX ORDER — ACETAMINOPHEN 500 MG
975 TABLET ORAL EVERY 8 HOURS
Refills: 0 | Status: DISCONTINUED | OUTPATIENT
Start: 2022-08-29 | End: 2022-08-30

## 2022-08-29 RX ORDER — MAGNESIUM HYDROXIDE 400 MG/1
30 TABLET, CHEWABLE ORAL DAILY
Refills: 0 | Status: DISCONTINUED | OUTPATIENT
Start: 2022-08-29 | End: 2022-08-30

## 2022-08-29 RX ORDER — ACETAMINOPHEN 500 MG
1000 TABLET ORAL ONCE
Refills: 0 | Status: COMPLETED | OUTPATIENT
Start: 2022-08-29 | End: 2022-08-29

## 2022-08-29 RX ORDER — CHLORHEXIDINE GLUCONATE 213 G/1000ML
1 SOLUTION TOPICAL ONCE
Refills: 0 | Status: COMPLETED | OUTPATIENT
Start: 2022-08-29 | End: 2022-08-29

## 2022-08-29 RX ORDER — OXYCODONE HYDROCHLORIDE 5 MG/1
5 TABLET ORAL EVERY 4 HOURS
Refills: 0 | Status: DISCONTINUED | OUTPATIENT
Start: 2022-08-29 | End: 2022-08-30

## 2022-08-29 RX ORDER — ONDANSETRON 8 MG/1
4 TABLET, FILM COATED ORAL EVERY 6 HOURS
Refills: 0 | Status: DISCONTINUED | OUTPATIENT
Start: 2022-08-29 | End: 2022-08-30

## 2022-08-29 RX ORDER — ATORVASTATIN CALCIUM 80 MG/1
40 TABLET, FILM COATED ORAL AT BEDTIME
Refills: 0 | Status: DISCONTINUED | OUTPATIENT
Start: 2022-08-29 | End: 2022-08-30

## 2022-08-29 RX ORDER — PANTOPRAZOLE SODIUM 20 MG/1
40 TABLET, DELAYED RELEASE ORAL
Refills: 0 | Status: DISCONTINUED | OUTPATIENT
Start: 2022-08-29 | End: 2022-08-30

## 2022-08-29 RX ORDER — ROSUVASTATIN CALCIUM 5 MG/1
1 TABLET ORAL
Qty: 0 | Refills: 0 | DISCHARGE

## 2022-08-29 RX ORDER — ASPIRIN/CALCIUM CARB/MAGNESIUM 324 MG
81 TABLET ORAL
Refills: 0 | Status: DISCONTINUED | OUTPATIENT
Start: 2022-08-30 | End: 2022-08-30

## 2022-08-29 RX ORDER — DEXTROSE 50 % IN WATER 50 %
12.5 SYRINGE (ML) INTRAVENOUS ONCE
Refills: 0 | Status: DISCONTINUED | OUTPATIENT
Start: 2022-08-29 | End: 2022-08-30

## 2022-08-29 RX ORDER — APREPITANT 80 MG/1
40 CAPSULE ORAL ONCE
Refills: 0 | Status: COMPLETED | OUTPATIENT
Start: 2022-08-29 | End: 2022-08-29

## 2022-08-29 RX ORDER — CEFAZOLIN SODIUM 1 G
2000 VIAL (EA) INJECTION EVERY 8 HOURS
Refills: 0 | Status: COMPLETED | OUTPATIENT
Start: 2022-08-29 | End: 2022-08-30

## 2022-08-29 RX ORDER — SENNA PLUS 8.6 MG/1
2 TABLET ORAL AT BEDTIME
Refills: 0 | Status: DISCONTINUED | OUTPATIENT
Start: 2022-08-29 | End: 2022-08-30

## 2022-08-29 RX ADMIN — APREPITANT 40 MILLIGRAM(S): 80 CAPSULE ORAL at 06:57

## 2022-08-29 RX ADMIN — OXYCODONE HYDROCHLORIDE 10 MILLIGRAM(S): 5 TABLET ORAL at 18:14

## 2022-08-29 RX ADMIN — OXYCODONE HYDROCHLORIDE 10 MILLIGRAM(S): 5 TABLET ORAL at 18:45

## 2022-08-29 RX ADMIN — Medication 8: at 18:13

## 2022-08-29 RX ADMIN — OXYCODONE HYDROCHLORIDE 10 MILLIGRAM(S): 5 TABLET ORAL at 15:09

## 2022-08-29 RX ADMIN — CELECOXIB 200 MILLIGRAM(S): 200 CAPSULE ORAL at 07:02

## 2022-08-29 RX ADMIN — CHLORHEXIDINE GLUCONATE 1 APPLICATION(S): 213 SOLUTION TOPICAL at 06:44

## 2022-08-29 RX ADMIN — Medication 1000 MILLIGRAM(S): at 06:57

## 2022-08-29 RX ADMIN — SENNA PLUS 2 TABLET(S): 8.6 TABLET ORAL at 22:20

## 2022-08-29 RX ADMIN — OXYCODONE HYDROCHLORIDE 10 MILLIGRAM(S): 5 TABLET ORAL at 22:20

## 2022-08-29 RX ADMIN — OXYCODONE HYDROCHLORIDE 10 MILLIGRAM(S): 5 TABLET ORAL at 14:09

## 2022-08-29 RX ADMIN — ATORVASTATIN CALCIUM 40 MILLIGRAM(S): 80 TABLET, FILM COATED ORAL at 22:20

## 2022-08-29 RX ADMIN — Medication 1 APPLICATION(S): at 06:44

## 2022-08-29 RX ADMIN — OXYCODONE HYDROCHLORIDE 10 MILLIGRAM(S): 5 TABLET ORAL at 23:20

## 2022-08-29 RX ADMIN — CELECOXIB 200 MILLIGRAM(S): 200 CAPSULE ORAL at 06:57

## 2022-08-29 RX ADMIN — Medication 100 MILLIGRAM(S): at 17:16

## 2022-08-29 RX ADMIN — Medication 4: at 22:27

## 2022-08-29 RX ADMIN — Medication 1000 MILLIGRAM(S): at 07:02

## 2022-08-29 RX ADMIN — POLYETHYLENE GLYCOL 3350 17 GRAM(S): 17 POWDER, FOR SOLUTION ORAL at 22:20

## 2022-08-29 NOTE — PHYSICAL THERAPY INITIAL EVALUATION ADULT - ADDITIONAL COMMENTS
Pt lives with her  in a house with 6 QUENT[address]. Upon discharge, will be staying at her daughter's house with 11 QU[address]. At baseline, ambulates independently with no DME.

## 2022-08-29 NOTE — PHYSICAL THERAPY INITIAL EVALUATION ADULT - SITTING BALANCE: DYNAMIC
good balance Ears: no ear pain and no hearing problems.Nose: no nasal congestion and no nasal drainage.Mouth/Throat: no dysphagia, no hoarseness and no throat pain.Neck: no lumps, no pain, no stiffness and no swollen glands.

## 2022-08-29 NOTE — PHYSICAL THERAPY INITIAL EVALUATION ADULT - MANUAL MUSCLE TESTING RESULTS, REHAB EVAL
functional mobility testing; >/=3+/5 bilateral UE and LE (ankle DF and PF 5/5)/grossly assessed due to

## 2022-08-29 NOTE — PHYSICAL THERAPY INITIAL EVALUATION ADULT - PERTINENT HX OF CURRENT PROBLEM, REHAB EVAL
Patient is 70 y/o female with c/o left knee pain x 10 years, worsening over the last 5 years, without preceding accident, injury or trauma. Failed conservative therapies for her symptoms including oral pain control with Advil.

## 2022-08-30 ENCOUNTER — APPOINTMENT (OUTPATIENT)
Dept: ORTHOPEDIC SURGERY | Facility: CLINIC | Age: 72
End: 2022-08-30

## 2022-08-30 ENCOUNTER — TRANSCRIPTION ENCOUNTER (OUTPATIENT)
Age: 72
End: 2022-08-30

## 2022-08-30 VITALS — HEART RATE: 80 BPM | SYSTOLIC BLOOD PRESSURE: 118 MMHG | DIASTOLIC BLOOD PRESSURE: 57 MMHG

## 2022-08-30 LAB
A1C WITH ESTIMATED AVERAGE GLUCOSE RESULT: 5.9 % — HIGH (ref 4–5.6)
ANION GAP SERPL CALC-SCNC: 7 MMOL/L — SIGNIFICANT CHANGE UP (ref 5–17)
BUN SERPL-MCNC: 14 MG/DL — SIGNIFICANT CHANGE UP (ref 7–23)
CALCIUM SERPL-MCNC: 9.1 MG/DL — SIGNIFICANT CHANGE UP (ref 8.4–10.5)
CHLORIDE SERPL-SCNC: 103 MMOL/L — SIGNIFICANT CHANGE UP (ref 96–108)
CO2 SERPL-SCNC: 28 MMOL/L — SIGNIFICANT CHANGE UP (ref 22–31)
CREAT SERPL-MCNC: 0.7 MG/DL — SIGNIFICANT CHANGE UP (ref 0.5–1.3)
EGFR: 92 ML/MIN/1.73M2 — SIGNIFICANT CHANGE UP
ESTIMATED AVERAGE GLUCOSE: 123 MG/DL — HIGH (ref 68–114)
GLUCOSE BLDC GLUCOMTR-MCNC: 111 MG/DL — HIGH (ref 70–99)
GLUCOSE BLDC GLUCOMTR-MCNC: 152 MG/DL — HIGH (ref 70–99)
GLUCOSE SERPL-MCNC: 132 MG/DL — HIGH (ref 70–99)
HCT VFR BLD CALC: 32.7 % — LOW (ref 34.5–45)
HGB BLD-MCNC: 11.1 G/DL — LOW (ref 11.5–15.5)
MCHC RBC-ENTMCNC: 30.2 PG — SIGNIFICANT CHANGE UP (ref 27–34)
MCHC RBC-ENTMCNC: 33.9 GM/DL — SIGNIFICANT CHANGE UP (ref 32–36)
MCV RBC AUTO: 89.1 FL — SIGNIFICANT CHANGE UP (ref 80–100)
NRBC # BLD: 0 /100 WBCS — SIGNIFICANT CHANGE UP (ref 0–0)
PLATELET # BLD AUTO: 210 K/UL — SIGNIFICANT CHANGE UP (ref 150–400)
POTASSIUM SERPL-MCNC: 5 MMOL/L — SIGNIFICANT CHANGE UP (ref 3.5–5.3)
POTASSIUM SERPL-SCNC: 5 MMOL/L — SIGNIFICANT CHANGE UP (ref 3.5–5.3)
RBC # BLD: 3.67 M/UL — LOW (ref 3.8–5.2)
RBC # FLD: 12.8 % — SIGNIFICANT CHANGE UP (ref 10.3–14.5)
SODIUM SERPL-SCNC: 138 MMOL/L — SIGNIFICANT CHANGE UP (ref 135–145)
WBC # BLD: 14.91 K/UL — HIGH (ref 3.8–10.5)
WBC # FLD AUTO: 14.91 K/UL — HIGH (ref 3.8–10.5)

## 2022-08-30 PROCEDURE — C1776: CPT

## 2022-08-30 PROCEDURE — 99222 1ST HOSP IP/OBS MODERATE 55: CPT

## 2022-08-30 PROCEDURE — 81001 URINALYSIS AUTO W/SCOPE: CPT

## 2022-08-30 PROCEDURE — 27447 TOTAL KNEE ARTHROPLASTY: CPT

## 2022-08-30 PROCEDURE — 80048 BASIC METABOLIC PNL TOTAL CA: CPT

## 2022-08-30 PROCEDURE — 97161 PT EVAL LOW COMPLEX 20 MIN: CPT

## 2022-08-30 PROCEDURE — 36415 COLL VENOUS BLD VENIPUNCTURE: CPT

## 2022-08-30 PROCEDURE — 97116 GAIT TRAINING THERAPY: CPT

## 2022-08-30 PROCEDURE — 85027 COMPLETE CBC AUTOMATED: CPT

## 2022-08-30 PROCEDURE — 73560 X-RAY EXAM OF KNEE 1 OR 2: CPT

## 2022-08-30 PROCEDURE — 83036 HEMOGLOBIN GLYCOSYLATED A1C: CPT

## 2022-08-30 PROCEDURE — 97530 THERAPEUTIC ACTIVITIES: CPT

## 2022-08-30 PROCEDURE — 97110 THERAPEUTIC EXERCISES: CPT

## 2022-08-30 PROCEDURE — C1713: CPT

## 2022-08-30 PROCEDURE — 82962 GLUCOSE BLOOD TEST: CPT

## 2022-08-30 PROCEDURE — S2900: CPT

## 2022-08-30 RX ORDER — OXYCODONE HYDROCHLORIDE 5 MG/1
1 TABLET ORAL
Qty: 0 | Refills: 0 | DISCHARGE
Start: 2022-08-30

## 2022-08-30 RX ORDER — POLYETHYLENE GLYCOL 3350 17 G/17G
17 POWDER, FOR SOLUTION ORAL
Qty: 0 | Refills: 0 | DISCHARGE
Start: 2022-08-30

## 2022-08-30 RX ORDER — ACETAMINOPHEN 500 MG
3 TABLET ORAL
Qty: 0 | Refills: 0 | DISCHARGE
Start: 2022-08-30

## 2022-08-30 RX ORDER — ASPIRIN/CALCIUM CARB/MAGNESIUM 324 MG
1 TABLET ORAL
Qty: 0 | Refills: 0 | DISCHARGE
Start: 2022-08-30

## 2022-08-30 RX ORDER — IBUPROFEN 200 MG
1 TABLET ORAL
Qty: 0 | Refills: 0 | DISCHARGE

## 2022-08-30 RX ORDER — PANTOPRAZOLE SODIUM 20 MG/1
1 TABLET, DELAYED RELEASE ORAL
Qty: 0 | Refills: 0 | DISCHARGE
Start: 2022-08-30

## 2022-08-30 RX ADMIN — Medication 100 MILLIGRAM(S): at 00:35

## 2022-08-30 RX ADMIN — OXYCODONE HYDROCHLORIDE 5 MILLIGRAM(S): 5 TABLET ORAL at 10:55

## 2022-08-30 RX ADMIN — Medication 1 TABLET(S): at 12:44

## 2022-08-30 RX ADMIN — Medication 1 MILLIGRAM(S): at 12:44

## 2022-08-30 RX ADMIN — Medication 81 MILLIGRAM(S): at 06:03

## 2022-08-30 RX ADMIN — OXYCODONE HYDROCHLORIDE 10 MILLIGRAM(S): 5 TABLET ORAL at 02:33

## 2022-08-30 RX ADMIN — OXYCODONE HYDROCHLORIDE 10 MILLIGRAM(S): 5 TABLET ORAL at 14:31

## 2022-08-30 RX ADMIN — Medication 975 MILLIGRAM(S): at 07:06

## 2022-08-30 RX ADMIN — Medication 2: at 07:21

## 2022-08-30 RX ADMIN — OXYCODONE HYDROCHLORIDE 5 MILLIGRAM(S): 5 TABLET ORAL at 09:55

## 2022-08-30 RX ADMIN — OXYCODONE HYDROCHLORIDE 10 MILLIGRAM(S): 5 TABLET ORAL at 03:33

## 2022-08-30 RX ADMIN — PANTOPRAZOLE SODIUM 40 MILLIGRAM(S): 20 TABLET, DELAYED RELEASE ORAL at 06:05

## 2022-08-30 RX ADMIN — SODIUM CHLORIDE 115 MILLILITER(S): 9 INJECTION, SOLUTION INTRAVENOUS at 00:36

## 2022-08-30 RX ADMIN — OXYCODONE HYDROCHLORIDE 10 MILLIGRAM(S): 5 TABLET ORAL at 15:31

## 2022-08-30 RX ADMIN — ONDANSETRON 4 MILLIGRAM(S): 8 TABLET, FILM COATED ORAL at 06:05

## 2022-08-30 RX ADMIN — Medication 975 MILLIGRAM(S): at 06:06

## 2022-08-30 NOTE — CONSULT NOTE ADULT - ASSESSMENT
Incomplete 71F w h/o prior h/o VTE - superficial? during pregnancy, NIDDM2, HTN, Vitamin D deficiency, Obesity (35) here for elective L TKR w Dr. Brantley    #Post-op state - Pain controlled. PPx: ASA 81mg BID. On bowel regimen and incentive spirometer  #NIDDM2 - Metformin at home. A1C 5.9. Also on rosuvastatin 10mg  #HTN - BP at target. Valsartan-HCTZ 160-12.5 home regimen - being held  #Leukocytosis - WBC 15. Baseline 8.5. Likely reactive from post-op as pt is afebrile, non-toxic appearing. Continue to monitor. Denies any urinary sxs - recently completed course of bactrim for UTI  #Anemia - hgb 11. baseline is 13.8. Likely operative losses. Does not appear to have active blood loss    Recommend  Doing well overall - continue PT  Would hold valsartan-hctz at this time as BP in 110-120. Would have patient check BPs daily and restart once SBP >140    DISPO: HPT pending clearing PT  PCP: Dr. Ramires -

## 2022-08-30 NOTE — OCCUPATIONAL THERAPY INITIAL EVALUATION ADULT - ADDITIONAL COMMENTS
Patient reports living with her  in an private home with 6 QUENTIN however upon d/c patient wants to stay at her home in [place], in which her daughter lives on the top floor, 11 [address] downstairs with walk in shower. Patient states she was independent with all ADL's, IADL's and functional mobility with no AD. Patient owns a SC. Patient is R hand dominant.

## 2022-08-30 NOTE — DISCHARGE NOTE NURSING/CASE MANAGEMENT/SOCIAL WORK - PATIENT PORTAL LINK FT
You can access the FollowMyHealth Patient Portal offered by [facility] by registering at the following website: [email or web address]. By joining [facility]’s FollowMyHealth portal, you will also be able to view your health information using other applications (apps) compatible with our system.

## 2022-08-30 NOTE — OCCUPATIONAL THERAPY INITIAL EVALUATION ADULT - REHAB POTENTIAL, OT EVAL
Patient demonstrates safe independence of all ADL's, and functional mobility with RW and is d/c off of skilled OT at this time.

## 2022-08-30 NOTE — OCCUPATIONAL THERAPY INITIAL EVALUATION ADULT - PERTINENT HX OF CURRENT PROBLEM, REHAB EVAL
Patient is 72 y/o female with c/o left knee pain x 10 years, worsening over the last 5 years, without preceding accident, injury or trauma. Failed conservative therapies for her symptoms including oral pain control with Advil. States she had  "superficial blood clot" during pregnancy which resolved without intervention. Of note patient was recently diagnosed with a UTI, finished a 5 day course of bactrim on 8/25/22. Patient elects to undergo left TKA with Dr. Brantley.

## 2022-08-30 NOTE — DISCHARGE NOTE NURSING/CASE MANAGEMENT/SOCIAL WORK - NSDCPEFALRISK_GEN_ALL_CORE
For information on Fall & Injury Prevention, visit: [email or web address] OR  [email or web address] OR  https://www.cdc.gov/steadi/patient.html

## 2022-08-30 NOTE — DISCHARGE NOTE PROVIDER - NSDCFUSCHEDAPPT_GEN_ALL_CORE_FT
[facility] Physician [facility]  ORTHOSURG [address]  Scheduled Appointment: 09/20/2022

## 2022-08-30 NOTE — DISCHARGE NOTE PROVIDER - NSDCMRMEDTOKEN_GEN_ALL_CORE_FT
Advil 200 mg oral tablet: 1 tab(s) orally every 6 hours, As Needed  metFORMIN 500 mg oral tablet: orally once a day  rosuvastatin 10 mg oral tablet: 1 tab(s) orally once a day  valsartan-hydrochlorothiazide 160 mg-12.5 mg oral tablet: 1 tab(s) orally once a day  Vitamin D3 1250 mcg (50,000 intl units) oral capsule: 1 cap(s) orally once a week   acetaminophen 325 mg oral tablet: 3 tab(s) orally every 8 hours for mild to moderate pain  aspirin 325 mg oral tablet: 1 tab(s) orally 2 times a day  metFORMIN 500 mg oral tablet: orally once a day  oxyCODONE 5 mg oral tablet: 1 tab(s) orally every 4 hours, As needed, for severe postop pain  pantoprazole 40 mg oral delayed release tablet: 1 tab(s) orally once a day (before a meal) while taking Aspirin  polyethylene glycol 3350 oral powder for reconstitution: 17 gram(s) orally once a day (at bedtime) until regular bowel movements return  rosuvastatin 10 mg oral tablet: 1 tab(s) orally once a day  valsartan-hydrochlorothiazide 160 mg-12.5 mg oral tablet: 1 tab(s) orally once a day  Vitamin D3 1250 mcg (50,000 intl units) oral capsule: 1 cap(s) orally once a week

## 2022-08-30 NOTE — CONSULT NOTE ADULT - SUBJECTIVE AND OBJECTIVE BOX
HPI "Patient is 70 y/o female with c/o left knee pain x 10 years, worsening over the last 5 years, without preceding accident, injury or trauma. Failed conservative therapies for her symptoms including oral pain control with Advil. States she had  "superficial blood clot" during pregnancy which resolved without intervention. Of note patient was recently diagnosed with a UTI, finished a 5 day course of bactrim on 22.   Patient elects to undergo left TKA with Dr. Brantley.  (19 Aug 2022 11:42)"      71F w h/o prior h/o VTE - superficial? during pregnancy, NIDDM2, HTN, Vitamin D deficiency, Obesity (35) here for elective L TKR w Dr. Brantley    #Post-op state - Pain __. PPx: ASA 81mg BID. On bowel regimen and incentive spirometer  #NIDDM2 - Metformin at home. A1C 5.9. Also on rosuvastatin 10mg  #HTN - BP at target. Valsartan-HCTZ 160-12.5 home regimen - being held  #Leukocytosis - WBC 15. Baseline  #Anemia - hgb 11. baseline    Recommend    DISPO: HPT  PCP: Dr. Ramires -     PAST MEDICAL & SURGICAL HISTORY:  OA (osteoarthritis)      HTN (hypertension)      HLD (hyperlipidemia)      Lower back pain      Sinusitis      2019 novel coronavirus disease (COVID-19)  2022      Shoulder pain, right  s/p fall      Fall  3/22      H/O hernia repair  Left groin        Home Meds: Home Medications:  Advil 200 mg oral tablet: 1 tab(s) orally every 6 hours, As Needed (29 Aug 2022 06:51)  metFORMIN 500 mg oral tablet: orally once a day (29 Aug 2022 06:51)  rosuvastatin 10 mg oral tablet: 1 tab(s) orally once a day (29 Aug 2022 06:51)  valsartan-hydrochlorothiazide 160 mg-12.5 mg oral tablet: 1 tab(s) orally once a day (29 Aug 2022 06:51)  Vitamin D3 1250 mcg (50,000 intl units) oral capsule: 1 cap(s) orally once a week (29 Aug 2022 06:51)    Allergies: Allergies    No Known Allergies    Intolerances      Soc:   Advanced Directives: Presumed Full Code     CURRENT MEDICATIONS:   --------------------------------------------------------------------------------------  Neurologic Medications  acetaminophen     Tablet .. 975 milliGRAM(s) Oral every 8 hours PRN Mild Pain (1 - 3)  aspirin 81 milliGRAM(s) Oral two times a day  HYDROmorphone  Injectable 0.5 milliGRAM(s) IV Push every 15 minutes PRN breakthrough pain  HYDROmorphone  Injectable 0.5 milliGRAM(s) IV Push every 4 hours PRN breakthrough pain  melatonin 5 milliGRAM(s) Oral at bedtime PRN Sleep  ondansetron Injectable 4 milliGRAM(s) IV Push every 6 hours PRN Nausea and/or Vomiting  oxyCODONE    IR 5 milliGRAM(s) Oral every 4 hours PRN Moderate Pain (4 - 6)  oxyCODONE    IR 10 milliGRAM(s) Oral every 4 hours PRN Severe Pain (7 - 10)    Respiratory Medications    Cardiovascular Medications    Gastrointestinal Medications  dextrose 5%. 1000 milliLiter(s) IV Continuous <Continuous>  dextrose 5%. 1000 milliLiter(s) IV Continuous <Continuous>  folic acid 1 milliGRAM(s) Oral daily  lactated ringers. 1000 milliLiter(s) IV Continuous <Continuous>  magnesium hydroxide Suspension 30 milliLiter(s) Oral daily PRN Constipation  multivitamin 1 Tablet(s) Oral daily  pantoprazole    Tablet 40 milliGRAM(s) Oral before breakfast  polyethylene glycol 3350 17 Gram(s) Oral at bedtime  senna 2 Tablet(s) Oral at bedtime    Genitourinary Medications    Hematologic/Oncologic Medications    Antimicrobial/Immunologic Medications    Endocrine/Metabolic Medications  atorvastatin 40 milliGRAM(s) Oral at bedtime  dextrose 50% Injectable 25 Gram(s) IV Push once  dextrose 50% Injectable 12.5 Gram(s) IV Push once  dextrose 50% Injectable 25 Gram(s) IV Push once  dextrose Oral Gel 15 Gram(s) Oral once PRN Blood Glucose LESS THAN 70 milliGRAM(s)/deciliter  glucagon  Injectable 1 milliGRAM(s) IntraMuscular once  insulin lispro (ADMELOG) corrective regimen sliding scale   SubCutaneous Before meals and at bedtime    Topical/Other Medications    --------------------------------------------------------------------------------------    VITAL SIGNS, INS/OUTS (last 24 hours):  --------------------------------------------------------------------------------------  ICU Vital Signs Last 24 Hrs  T(C): 36.4 (30 Aug 2022 08:28), Max: 36.8 (30 Aug 2022 00:10)  T(F): 97.6 (30 Aug 2022 08:28), Max: 98.3 (30 Aug 2022 00:10)  HR: 64 (30 Aug 2022 08:28) (59 - 83)  BP: 108/64 (30 Aug 2022 08:28) (107/70 - 139/75)  BP(mean): 64 (30 Aug 2022 08:28) (64 - 93)  ABP: --  ABP(mean): --  RR: 18 (30 Aug 2022 08:28) (16 - 18)  SpO2: 98% (30 Aug 2022 08:28) (94% - 99%)    O2 Parameters below as of 30 Aug 2022 08:28  Patient On (Oxygen Delivery Method): room air          I&O's Summary    29 Aug 2022 07:01  -  30 Aug 2022 07:00  --------------------------------------------------------  IN: 675 mL / OUT: 350 mL / NET: 325 mL      --------------------------------------------------------------------------------------    EXAM:    LABS  --------------------------------------------------------------------------------------  Labs:  CAPILLARY BLOOD GLUCOSE      POCT Blood Glucose.: 152 mg/dL (30 Aug 2022 06:45)  POCT Blood Glucose.: 225 mg/dL (29 Aug 2022 21:59)  POCT Blood Glucose.: 324 mg/dL (29 Aug 2022 17:22)                          11.1   14.91 )-----------( 210      ( 30 Aug 2022 04:52 )             32.7             138  |  103  |  14  ----------------------------<  132<H>  5.0   |  28  |  0.70      Calcium, Total Serum: 9.1 mg/dL (22 @ 04:52)      LFTs:         Coags:            Urinalysis Basic - ( 29 Aug 2022 06:38 )    Color: Yellow / Appearance: Clear / S.010 / pH: x  Gluc: x / Ketone: NEGATIVE  / Bili: Negative / Urobili: 0.2 E.U./dL   Blood: x / Protein: NEGATIVE mg/dL / Nitrite: NEGATIVE   Leuk Esterase: Large / RBC: < 5 /HPF / WBC 5-10 /HPF   Sq Epi: x / Non Sq Epi: 0-5 /HPF / Bacteria: Present /HPF          --------------------------------------------------------------------------------------    OTHER LABS    IMAGING RESULTS  ****************     HPI "Patient is 72 y/o female with c/o left knee pain x 10 years, worsening over the last 5 years, without preceding accident, injury or trauma. Failed conservative therapies for her symptoms including oral pain control with Advil. States she had  "superficial blood clot" during pregnancy which resolved without intervention. Of note patient was recently diagnosed with a UTI, finished a 5 day course of bactrim on 22.   Patient elects to undergo left TKA with Dr. Brantley.  (19 Aug 2022 11:42)"      71F w h/o prior h/o VTE - superficial? during pregnancy, NIDDM2, HTN, Vitamin D deficiency, Obesity (35) here for elective L TKR w Dr. Brantley    Pt reports progressive pain in knees not relieved w conservative measures. This morning pt did report a bit of nausea and lightheadedness, but this has since resolved. States knee pain is controlled. Eating wo N/V/Abd pain. +Flatus wo BM. Voiding wo dysuria -- recently completed course of bactrim for UTI last week. Denies any fever, cough, chills, sweats.     Pt reports h/o superficial blood clot in her leg after giving birth approx 40 years ago - does not recall being on blood thinners at the time.    ROS: 12 point ROS reviewed as per HPI and otherwise negative  FH: Denies DVT/PE  SH: Non-smoker    PAST MEDICAL & SURGICAL HISTORY:  OA (osteoarthritis)      HTN (hypertension)      HLD (hyperlipidemia)      Lower back pain      Sinusitis      2019 novel coronavirus disease (COVID-19)  2022      Shoulder pain, right  s/p fall      Fall  3/22      H/O hernia repair  Left groin        Home Meds: Home Medications:  Advil 200 mg oral tablet: 1 tab(s) orally every 6 hours, As Needed (29 Aug 2022 06:51)  metFORMIN 500 mg oral tablet: orally once a day (29 Aug 2022 06:51)  rosuvastatin 10 mg oral tablet: 1 tab(s) orally once a day (29 Aug 2022 06:51)  valsartan-hydrochlorothiazide 160 mg-12.5 mg oral tablet: 1 tab(s) orally once a day (29 Aug 2022 06:51)  Vitamin D3 1250 mcg (50,000 intl units) oral capsule: 1 cap(s) orally once a week (29 Aug 2022 06:51)    Allergies: Allergies    No Known Allergies    Intolerances      Soc:   Advanced Directives: Presumed Full Code     CURRENT MEDICATIONS:   --------------------------------------------------------------------------------------  Neurologic Medications  acetaminophen     Tablet .. 975 milliGRAM(s) Oral every 8 hours PRN Mild Pain (1 - 3)  aspirin 81 milliGRAM(s) Oral two times a day  HYDROmorphone  Injectable 0.5 milliGRAM(s) IV Push every 15 minutes PRN breakthrough pain  HYDROmorphone  Injectable 0.5 milliGRAM(s) IV Push every 4 hours PRN breakthrough pain  melatonin 5 milliGRAM(s) Oral at bedtime PRN Sleep  ondansetron Injectable 4 milliGRAM(s) IV Push every 6 hours PRN Nausea and/or Vomiting  oxyCODONE    IR 5 milliGRAM(s) Oral every 4 hours PRN Moderate Pain (4 - 6)  oxyCODONE    IR 10 milliGRAM(s) Oral every 4 hours PRN Severe Pain (7 - 10)    Respiratory Medications    Cardiovascular Medications    Gastrointestinal Medications  dextrose 5%. 1000 milliLiter(s) IV Continuous <Continuous>  dextrose 5%. 1000 milliLiter(s) IV Continuous <Continuous>  folic acid 1 milliGRAM(s) Oral daily  lactated ringers. 1000 milliLiter(s) IV Continuous <Continuous>  magnesium hydroxide Suspension 30 milliLiter(s) Oral daily PRN Constipation  multivitamin 1 Tablet(s) Oral daily  pantoprazole    Tablet 40 milliGRAM(s) Oral before breakfast  polyethylene glycol 3350 17 Gram(s) Oral at bedtime  senna 2 Tablet(s) Oral at bedtime    Genitourinary Medications    Hematologic/Oncologic Medications    Antimicrobial/Immunologic Medications    Endocrine/Metabolic Medications  atorvastatin 40 milliGRAM(s) Oral at bedtime  dextrose 50% Injectable 25 Gram(s) IV Push once  dextrose 50% Injectable 12.5 Gram(s) IV Push once  dextrose 50% Injectable 25 Gram(s) IV Push once  dextrose Oral Gel 15 Gram(s) Oral once PRN Blood Glucose LESS THAN 70 milliGRAM(s)/deciliter  glucagon  Injectable 1 milliGRAM(s) IntraMuscular once  insulin lispro (ADMELOG) corrective regimen sliding scale   SubCutaneous Before meals and at bedtime    Topical/Other Medications    --------------------------------------------------------------------------------------    VITAL SIGNS, INS/OUTS (last 24 hours):  --------------------------------------------------------------------------------------  ICU Vital Signs Last 24 Hrs  T(C): 36.4 (30 Aug 2022 08:28), Max: 36.8 (30 Aug 2022 00:10)  T(F): 97.6 (30 Aug 2022 08:28), Max: 98.3 (30 Aug 2022 00:10)  HR: 64 (30 Aug 2022 08:28) (59 - 83)  BP: 108/64 (30 Aug 2022 08:28) (107/70 - 139/75)  BP(mean): 64 (30 Aug 2022 08:28) (64 - 93)  ABP: --  ABP(mean): --  RR: 18 (30 Aug 2022 08:28) (16 - 18)  SpO2: 98% (30 Aug 2022 08:28) (94% - 99%)    O2 Parameters below as of 30 Aug 2022 08:28  Patient On (Oxygen Delivery Method): room air          I&O's Summary    29 Aug 2022 07:01  -  30 Aug 2022 07:00  --------------------------------------------------------  IN: 675 mL / OUT: 350 mL / NET: 325 mL      --------------------------------------------------------------------------------------    EXAM:  GEN: female in NAD on RA  HEENT: NC/AT, MMM  CV: RRR, nml S1S2, no murmurs  PULM: nml effort, CTAB  ABD: Soft, non-distended, NABS, non-tender  NEURO  A/O x3, moving all extremities, Sensation intact  L knee dressing c/d/i. Minimal surrounding swelling. 5/5 in plantarflex/ext b/l  PSYCH: Appropriate    LABS  --------------------------------------------------------------------------------------  Labs:  CAPILLARY BLOOD GLUCOSE      POCT Blood Glucose.: 152 mg/dL (30 Aug 2022 06:45)  POCT Blood Glucose.: 225 mg/dL (29 Aug 2022 21:59)  POCT Blood Glucose.: 324 mg/dL (29 Aug 2022 17:22)                          11.1   14.91 )-----------( 210      ( 30 Aug 2022 04:52 )             32.7             138  |  103  |  14  ----------------------------<  132<H>  5.0   |  28  |  0.70      Calcium, Total Serum: 9.1 mg/dL (22 @ 04:52)      LFTs:         Coags:            Urinalysis Basic - ( 29 Aug 2022 06:38 )    Color: Yellow / Appearance: Clear / S.010 / pH: x  Gluc: x / Ketone: NEGATIVE  / Bili: Negative / Urobili: 0.2 E.U./dL   Blood: x / Protein: NEGATIVE mg/dL / Nitrite: NEGATIVE   Leuk Esterase: Large / RBC: < 5 /HPF / WBC 5-10 /HPF   Sq Epi: x / Non Sq Epi: 0-5 /HPF / Bacteria: Present /HPF          --------------------------------------------------------------------------------------    OTHER LABS    IMAGING RESULTS  ****************

## 2022-08-30 NOTE — DISCHARGE NOTE PROVIDER - HOSPITAL COURSE
Admitted 8/29/22  Surgery- left TKR  Anusha-op Antibiotics  Pain control  DVT prophylaxis- ASA 81mg bid x 4 weeks  OOB/Physical Therapy   Admitted: 8/29/22  Surgery: 8/29/22, left TKR  Anusha-op Antibiotics: Ancef  Pain control  DVT prophylaxis- SCDs, ASA 81mg bid x 4 weeks  OOB/Physical Therapy  Medicine consulted for co-management

## 2022-08-30 NOTE — PROGRESS NOTE ADULT - SUBJECTIVE AND OBJECTIVE BOX
Orthopaedic Surgery Progress Note    Patient seen and examined. ANJUM. Patient without complaints. Pain controlled. Denies CP, SOB, N/V, tactile fevers, calf pain.  Pt is POD #1 s/p left total knee arthroplasty       Vital Signs Last 24 Hrs  T(C): 36.4 (30 Aug 2022 08:28), Max: 36.8 (30 Aug 2022 00:10)  T(F): 97.6 (30 Aug 2022 08:28), Max: 98.3 (30 Aug 2022 00:10)  HR: 80 (30 Aug 2022 09:17) (59 - 83)  BP: 118/57 (30 Aug 2022 09:17) (107/70 - 139/75)  BP(mean): 64 (30 Aug 2022 08:28) (64 - 93)  RR: 18 (30 Aug 2022 08:28) (16 - 18)  SpO2: 98% (30 Aug 2022 08:28) (94% - 99%)    Parameters below as of 30 Aug 2022 08:28  Patient On (Oxygen Delivery Method): room air       CAPILLARY BLOOD GLUCOSE      POCT Blood Glucose.: 152 mg/dL (30 Aug 2022 06:45)  POCT Blood Glucose.: 225 mg/dL (29 Aug 2022 21:59)  POCT Blood Glucose.: 324 mg/dL (29 Aug 2022 17:22)                  Physical Exam:  Pt sitting comfortably in chair, NAD.  Skin warm and well perfused, no visible erythema/ecchymoses.  Dressing C/D/I  EHL/FHL/TA/GS firing bilateral lower extremities  SLT in tact to distal bilateral lower extremities  Calves soft and nontender to palpation  DP pulses palpable bilaterally     LABS                        11.1   14.91 )-----------( 210      ( 30 Aug 2022 04:52 )             32.7                                08-30    138  |  103  |  14  ----------------------------<  132<H>  5.0   |  28  |  0.70    Ca    9.1      30 Aug 2022 04:52      A/P: 71F POD #1 s/p left total knee arthroplasty     CONTINUE:        1. PT: WBAT   2. DVT prophylaxis: ASA 81 BID, SCDs  3. Pain Control as needed   4. Dispo: Home pending PT clearance       
Ortho Note    Pt seen and examined on morning rounds. Pt comfortable without complaints, pain controlled.  Denies CP, SOB, N/V, numbness/tingling     Vital Signs Last 24 Hrs  T(C): 36.6 (08-30-22 @ 04:32), Max: 36.8 (08-30-22 @ 00:10)  T(F): 97.8 (08-30-22 @ 04:32), Max: 98.3 (08-30-22 @ 00:10)  HR: 74 (08-30-22 @ 04:32) (74 - 74)  BP: 127/69 (08-30-22 @ 04:32) (109/67 - 127/69)  BP(mean): 88 (08-30-22 @ 04:32) (88 - 88)  RR: 18 (08-30-22 @ 04:32) (18 - 18)  SpO2: 97% (08-30-22 @ 04:32) (95% - 97%)  I&O's Summary    29 Aug 2022 07:01  -  30 Aug 2022 05:35  --------------------------------------------------------  IN: 675 mL / OUT: 350 mL / NET: 325 mL        Physical Exam:  General: Pt Alert and oriented, NAD  DSG C/D/I: Aquacel and ice machine  Pulses: DP2+ bilat LE  Sensation: General sensation to light touch intact bilat LE  Motor: EHL/FHL/TA/GS 5/5 bilat LE                          11.1   14.91 )-----------( 210      ( 30 Aug 2022 04:52 )             32.7             A/P: 71yFemale s/p left total knee replacement, 8/29/22, Dr. Brantley  - Stable  - Pain Control  - DVT ppx: [id number] BID  - Post op abx: Ancef  - PT, WBS: WBAT  - Dispo: HPT pending PT clearance  Sotero Rome, PGY-2  Ortho Pager [phone]
Ortho Post Op Check    Procedure: L TKR  Surgeon: Dr. Brantley    Pt comfortable without complaints, pain controlled  Denies CP, SOB, N/V, numbness/tingling     Vital Signs Last 24 Hrs  T(C): 36.3 (08-29-22 @ 11:42), Max: 36.3 (08-29-22 @ 11:42)  T(F): 97.3 (08-29-22 @ 11:42), Max: 97.3 (08-29-22 @ 11:42)  HR: 74 (08-29-22 @ 12:42) (70 - 76)  BP: 124/62 (08-29-22 @ 12:42) (111/61 - 137/62)  BP(mean): 86 (08-29-22 @ 12:42) (80 - 93)  RR: 16 (08-29-22 @ 12:42) (16 - 16)  SpO2: 97% (08-29-22 @ 12:42) (97% - 99%)  I&O's Summary      General: Pt Alert and oriented, NAD  DSG C/D/I: Aquacel and ice machine  Pulses: DP2+ bilat LE  Sensation: General sensation to light touch intact bilat LE  Motor: EHL/FHL/TA/GS 5/5 bilat LE    Post-op X-Ray: Well placed hardware, no bony complication    A/P: 71yFemale s/p left total knee replacement, 8/29/22, Dr. Brantley  - Stable  - Pain Control  - DVT ppx: [id number] BID  - Post op abx: Ancef  - PT, WBS: WBAT    Ortho Pager [phone]

## 2022-08-30 NOTE — DISCHARGE NOTE PROVIDER - NSDCFUADDINST_GEN_ALL_CORE_FT
**PLEASE SEE DR. KLEIN'S SEPARATE DISCHARGE INSTRUCTION SHEET. YOUR MEDICATION WERE SENT TO [facility], LOCATED ON THE FIRST FLOOR OF [facility]. TAKE MEDICATIONS AS PRESCRIBED.**    Weight bear as tolerated with assistive device.  No strenuous activity, heavy lifting, driving or returning to work until cleared by MD.  You may shower - dressing is water-resistant, no soaking in bathtubs.    You have an aquacel dressing.  Remove dressing after post op day 7. Underneath your aquacel, you have prineo (looks like strip of tape). Keep this on until your follow-up with Dr. Klein. Keep incision clean and dry.  Try to have regular bowel movements, take stool softener or laxative if necessary.    Swelling may travel all the way down leg to foot, this is normal and will subside in a few weeks.  Call to schedule an appt with Dr. Klein for follow up, if you have staples or sutures they will be removed in office.  Contact your doctor if you experience: fever greater than 101.5, chills, chest pain, difficulty breathing, redness or excessive drainage around the incision, other concerns.  Follow up with your primary care provider.   **PLEASE SEE DR. KLEIN'S SEPARATE DISCHARGE INSTRUCTION SHEET. YOUR MEDICATION WERE SENT TO [facility], LOCATED ON THE FIRST FLOOR OF [facility]. TAKE MEDICATIONS AS PRESCRIBED.**    Weight bear as tolerated with assistive device.  No strenuous activity, heavy lifting, driving or returning to work until cleared by MD.  You may shower - dressing is water-resistant, no soaking in bathtubs.    You have an aquacel dressing.  Remove dressing after post op day 7. Underneath your aquacel, you have prineo (looks like strip of tape). Keep this on until your follow-up with Dr. Klein. Keep incision clean and dry.  Try to have regular bowel movements, take stool softener or laxative if necessary.    Swelling may travel all the way down leg to foot, this is normal and will subside in a few weeks. You may elevate the leg at the level of the ankle. Do not place pillows directly under the knee.   Call to schedule an appt with Dr. Klein for follow up, if you have staples or sutures they will be removed in office.  Contact your doctor if you experience: fever greater than 101.5, chills, chest pain, difficulty breathing, redness or excessive drainage around the incision, other concerns.  Follow up with your primary care provider.

## 2022-08-30 NOTE — OCCUPATIONAL THERAPY INITIAL EVALUATION ADULT - DIAGNOSIS, OT EVAL
Patient POD # 1 s/p L TKR, presents with 2/10 pain at rest, 6/10 during mobility, decreased L Knee ROM/strength however does not impact independence with functional activities and mobility with RW. Patient demonstrates safe independence of all ADL's, and functional mobility with RW and is d/c off of skilled OT at this time.

## 2022-08-30 NOTE — DISCHARGE NOTE PROVIDER - CARE PROVIDER_API CALL
Gil Brantley)  Orthopedics  [address], [address] [facility], [place] [address]  Phone: [phone]  Fax: [phone]  Follow Up Time: 2 weeks

## 2022-08-30 NOTE — DISCHARGE NOTE PROVIDER - NSDCCPTREATMENT_GEN_ALL_CORE_FT
PRINCIPAL PROCEDURE  Procedure: Left total knee arthroplasty  Findings and Treatment: osteoarthritis

## 2022-08-30 NOTE — OCCUPATIONAL THERAPY INITIAL EVALUATION ADULT - GENERAL OBSERVATIONS, REHAB EVAL
Patient received semisupine in bed +heplock IV, +cryocuff on L knee, +L knee bandage C/D/I, room air, NAD. Patient A&Ox4, agreeable and tolerated session well.

## 2022-08-30 NOTE — DISCHARGE NOTE PROVIDER - NSDCCPCAREPLAN_GEN_ALL_CORE_FT
PRINCIPAL DISCHARGE DIAGNOSIS  Diagnosis: Osteoarthritis of left knee  Assessment and Plan of Treatment: improvement s/p left TKR

## 2022-08-31 NOTE — CONSULT NOTE ADULT - SUBJECTIVE AND OBJECTIVE BOX
HPI:  Patient is 72 y/o female with c/o left knee pain x 10 years, worsening over the last 5 years, without preceding accident, injury or trauma. Failed conservative therapies for her symptoms including oral pain control with Advil. States she had  "superficial blood clot" during pregnancy which resolved without intervention. Of note patient was recently diagnosed with a UTI, finished a 5 day course of bactrim on 22.  Patient elects to undergo left TKA with Dr. Brantley. Patient POD #1  s/p left total knee arthroplasty. Nephrology consulted for HTN management       PAST MEDICAL & SURGICAL HISTORY:  OA (osteoarthritis)      HTN (hypertension)      HLD (hyperlipidemia)      Lower back pain      Sinusitis      2019 novel coronavirus disease (COVID-19)  2022      Shoulder pain, right  s/p fall      Fall  3/22      H/O hernia repair  Left groin            Allergies:  No Known Allergies      Home Medications:       Hospital Medications:   MEDICATIONS  (STANDING):      SOCIAL HISTORY:  Denies ETOh, Smoking    Family History:  FAMILY HISTORY:        VITALS:  T(F): --  HR: --  BP: --  RR: --  SpO2: --  Wt(kg): --      CAPILLARY BLOOD GLUCOSE      POCT Blood Glucose.: 111 mg/dL (30 Aug 2022 12:26)      Review of Systems:  CONSTITUTIONAL: No fever or chills.  RESPIRATORY: No shortness of breath, cough  CARDIOVASCULAR: No Chest pain, shortness of breath, palpitations  GASTROINTESTINAL: No abdominal pain, nausea, vomiting, diarrhea  GENITOURINARY: No urinary frequency, gross hematuria, dysuria  NEUROLOGICAL: No headache, weakness  SKIN: No rash or skin lesion  MUSCULOSKELETAL: No swelling  Psych: Denies suicidal or homicidal ideation    PHYSICAL EXAM:  GEN: female in NAD on RA  HEENT: NC/AT, MMM  CV: RRR, nml S1S2, no murmurs  PULM: nml effort, CTAB  ABD: Soft, non-distended, NABS, non-tender  NEURO  A/O x3, moving all extremities, Sensation intact  L knee dressing c/d/i. Minimal surrounding swelling. 5/5 in plantarflex/ext b/l  PSYCH: Appropriate  LABS:      138  |  103  |  14  ----------------------------<  132<H>  5.0   |  28  |  0.70    Ca    9.1      30 Aug 2022 04:52      Creatinine Trend: 0.70 <--                        11.1   14.91 )-----------( 210      ( 30 Aug 2022 04:52 )             32.7     Urine Studies:  Urinalysis Basic - ( 29 Aug 2022 06:38 )    Color: Yellow / Appearance: Clear / S.010 / pH:   Gluc:  / Ketone: NEGATIVE  / Bili: Negative / Urobili: 0.2 E.U./dL   Blood:  / Protein: NEGATIVE mg/dL / Nitrite: NEGATIVE   Leuk Esterase: Large / RBC: < 5 /HPF / WBC 5-10 /HPF   Sq Epi:  / Non Sq Epi: 0-5 /HPF / Bacteria: Present /HPF

## 2022-08-31 NOTE — CONSULT NOTE ADULT - ASSESSMENT
71F w h/o prior h/o VTE - superficial? during pregnancy, NIDDM2, HTN, Vitamin D deficiency, Obesity (35) here for elective L TKR w Dr. Brantley    Stable kidney function post op Cr. .70     HTN:  Patient with low blood pressure, hold home BP for now   Restart once SBP >140    DM:  On Metformin   A1c 5.9     Leukocytosis    WBC 15. Baseline 8.5. Likely reactive from post-op as pt is afebrile, non-toxic appearing.   Continue to monitor.     Anemia   hgb 11. baseline is 13.8. Likely operative losses.   Does not appear to have active blood loss  Continue to monitor     UTI:  Denies any urinary sxs   recently completed course of bactrim for UTI  UA + for UTI     L TKR   Continue with current pain management   PT recs

## 2022-09-01 DIAGNOSIS — E55.9 VITAMIN D DEFICIENCY, UNSPECIFIED: ICD-10-CM

## 2022-09-01 DIAGNOSIS — M48.061 SPINAL STENOSIS, LUMBAR REGION WITHOUT NEUROGENIC CLAUDICATION: ICD-10-CM

## 2022-09-01 DIAGNOSIS — J32.9 CHRONIC SINUSITIS, UNSPECIFIED: ICD-10-CM

## 2022-09-01 DIAGNOSIS — E78.00 PURE HYPERCHOLESTEROLEMIA, UNSPECIFIED: ICD-10-CM

## 2022-09-01 DIAGNOSIS — E66.9 OBESITY, UNSPECIFIED: ICD-10-CM

## 2022-09-01 DIAGNOSIS — K76.0 FATTY (CHANGE OF) LIVER, NOT ELSEWHERE CLASSIFIED: ICD-10-CM

## 2022-09-01 DIAGNOSIS — I44.60 UNSPECIFIED FASCICULAR BLOCK: ICD-10-CM

## 2022-09-01 DIAGNOSIS — I45.10 UNSPECIFIED RIGHT BUNDLE-BRANCH BLOCK: ICD-10-CM

## 2022-09-01 DIAGNOSIS — M17.12 UNILATERAL PRIMARY OSTEOARTHRITIS, LEFT KNEE: ICD-10-CM

## 2022-09-01 DIAGNOSIS — D62 ACUTE POSTHEMORRHAGIC ANEMIA: ICD-10-CM

## 2022-09-01 DIAGNOSIS — M54.50 LOW BACK PAIN, UNSPECIFIED: ICD-10-CM

## 2022-09-01 DIAGNOSIS — E11.9 TYPE 2 DIABETES MELLITUS WITHOUT COMPLICATIONS: ICD-10-CM

## 2022-09-01 DIAGNOSIS — D72.829 ELEVATED WHITE BLOOD CELL COUNT, UNSPECIFIED: ICD-10-CM

## 2022-09-01 DIAGNOSIS — I10 ESSENTIAL (PRIMARY) HYPERTENSION: ICD-10-CM

## 2022-09-04 DIAGNOSIS — G89.18 OTHER ACUTE POSTPROCEDURAL PAIN: ICD-10-CM

## 2022-09-20 ENCOUNTER — APPOINTMENT (OUTPATIENT)
Dept: ORTHOPEDIC SURGERY | Facility: CLINIC | Age: 72
End: 2022-09-20

## 2022-09-20 VITALS
DIASTOLIC BLOOD PRESSURE: 80 MMHG | HEIGHT: 62 IN | BODY MASS INDEX: 34.23 KG/M2 | SYSTOLIC BLOOD PRESSURE: 176 MMHG | HEART RATE: 64 BPM | OXYGEN SATURATION: 98 % | WEIGHT: 186 LBS

## 2022-09-20 PROBLEM — M54.50 LOW BACK PAIN, UNSPECIFIED: Chronic | Status: ACTIVE | Noted: 2022-08-19

## 2022-09-20 PROBLEM — M19.90 UNSPECIFIED OSTEOARTHRITIS, UNSPECIFIED SITE: Chronic | Status: ACTIVE | Noted: 2022-08-19

## 2022-09-20 PROBLEM — M25.511 PAIN IN RIGHT SHOULDER: Chronic | Status: ACTIVE | Noted: 2022-08-19

## 2022-09-20 PROBLEM — J32.9 CHRONIC SINUSITIS, UNSPECIFIED: Chronic | Status: ACTIVE | Noted: 2022-08-19

## 2022-09-20 PROBLEM — I10 ESSENTIAL (PRIMARY) HYPERTENSION: Chronic | Status: ACTIVE | Noted: 2022-08-19

## 2022-09-20 PROBLEM — U07.1 COVID-19: Chronic | Status: ACTIVE | Noted: 2022-08-19

## 2022-09-20 PROBLEM — E78.5 HYPERLIPIDEMIA, UNSPECIFIED: Chronic | Status: ACTIVE | Noted: 2022-08-19
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
## 2022-09-20 PROCEDURE — 99024 POSTOP FOLLOW-UP VISIT: CPT

## 2022-09-28 NOTE — HISTORY OF PRESENT ILLNESS
OCUCOAT at time of cataract surgery OD. [de-identified] : first post of for left total knee replacement , surgical date 08/29/2022 [de-identified] : 72 y/o female presents about 3 weeks s/p  L TKA doing well. She reports a good recovery and states PT is going well. Notes she is able to walk and stand for longer periods of time. Reports continued acid reflux worst at night. Ambulating with a cane. [de-identified] : Left knee.\par  ROM 0 -110\par benign appearing. distal scabbing [de-identified] : 3 weeks s/p  LTKA [de-identified] : - Continue Famotidine and Pantoprazole for acid reflux\par - Cont Tylenol + Celebrex as needed for pain\par -Tibial sutures removed today\par - PT/ HEP\par - Followup in 4 weeks with left knee XRs

## 2022-09-30 ENCOUNTER — RX RENEWAL (OUTPATIENT)
Age: 72
End: 2022-09-30

## 2022-10-03 ENCOUNTER — RX RENEWAL (OUTPATIENT)
Age: 72
End: 2022-10-03

## 2022-10-21 ENCOUNTER — APPOINTMENT (OUTPATIENT)
Dept: ORTHOPEDIC SURGERY | Facility: CLINIC | Age: 72
End: 2022-10-21

## 2022-10-21 ENCOUNTER — RESULT REVIEW (OUTPATIENT)
Age: 72
End: 2022-10-21

## 2022-10-21 ENCOUNTER — OUTPATIENT (OUTPATIENT)
Dept: OUTPATIENT SERVICES | Facility: HOSPITAL | Age: 72
LOS: 1 days | End: 2022-10-21
Payer: MEDICARE

## 2022-10-21 VITALS
HEART RATE: 60 BPM | HEIGHT: 62 IN | DIASTOLIC BLOOD PRESSURE: 85 MMHG | BODY MASS INDEX: 34.23 KG/M2 | OXYGEN SATURATION: 99 % | WEIGHT: 186 LBS | SYSTOLIC BLOOD PRESSURE: 144 MMHG

## 2022-10-21 DIAGNOSIS — Z98.890 OTHER SPECIFIED POSTPROCEDURAL STATES: Chronic | ICD-10-CM

## 2022-10-21 PROCEDURE — 73564 X-RAY EXAM KNEE 4 OR MORE: CPT | Mod: 26,LT

## 2022-10-21 PROCEDURE — 99024 POSTOP FOLLOW-UP VISIT: CPT

## 2022-10-21 PROCEDURE — 73564 X-RAY EXAM KNEE 4 OR MORE: CPT

## 2022-10-23 NOTE — END OF VISIT
[FreeTextEntry3] : All medical record entries made by the Scribe were at my, Dr. Gil Brantley, direction and personally dictated by me on 10/21/2022. I have reviewed the chart and agree that the record accurately reflects my personal performance of the history, physical exam, assessment and plan. I have also personally directed, reviewed, and agreed with the chart.

## 2022-10-23 NOTE — HISTORY OF PRESENT ILLNESS
[de-identified] : second post of for left total knee replacement , surgical date 08/29/2022.  [de-identified] : 10/21/22: 72 y/o female presents about 7 weeks s/p LTKA. She states she initially had a difficult recovery but has recently turned a corner over the past few weeks. She admits improved ROM and balance walking with the left knee. States she feels shorter on the (right) non operative leg and has been using insoles to balance her height. She reports mild pain and is taking Tylenol + Advil as needed. She notes she is walking daily without any assistive devices and continues to participate in PT and HEP. [de-identified] : Left knee:\par Well healed surgical incision benign appearing\par ROM: 0-115\par Ligaments: stable\par Tenderness: Medial joint line TTP [de-identified] : 4 views of the left knee were taken today, 10/21/2022 which demonstrate a well-fixed left total knee arthroplasty in unchanged position as compared to previous imaging and without evidence of mechanical complication. Patella sits at normal height and tracks centrally.  [de-identified] : 70 y/o female about 7 weeks s/p LTKA doing well [de-identified] : - New PT script\par - HEP\par - Cont Tylenol + Advil as needed for pain\par - F/U in 2 months with left knee XRs

## 2022-10-23 NOTE — ADDENDUM
[FreeTextEntry1] : Documented by Christy Calderon acting as a scribe for Dr. Gil Brantley on 10/21/2022.

## 2022-11-21 ENCOUNTER — LABORATORY RESULT (OUTPATIENT)
Age: 72
End: 2022-11-21

## 2022-11-21 ENCOUNTER — APPOINTMENT (OUTPATIENT)
Dept: NEPHROLOGY | Facility: CLINIC | Age: 72
End: 2022-11-21

## 2022-11-21 VITALS — DIASTOLIC BLOOD PRESSURE: 81 MMHG | HEART RATE: 70 BPM | SYSTOLIC BLOOD PRESSURE: 125 MMHG

## 2022-11-21 VITALS — WEIGHT: 188 LBS | BODY MASS INDEX: 34.39 KG/M2

## 2022-11-21 VITALS — DIASTOLIC BLOOD PRESSURE: 96 MMHG | SYSTOLIC BLOOD PRESSURE: 120 MMHG

## 2022-11-21 VITALS — DIASTOLIC BLOOD PRESSURE: 84 MMHG | SYSTOLIC BLOOD PRESSURE: 129 MMHG | HEART RATE: 90 BPM

## 2022-11-21 VITALS — SYSTOLIC BLOOD PRESSURE: 145 MMHG | DIASTOLIC BLOOD PRESSURE: 82 MMHG | HEART RATE: 71 BPM

## 2022-11-21 DIAGNOSIS — E55.9 VITAMIN D DEFICIENCY, UNSPECIFIED: ICD-10-CM

## 2022-11-21 DIAGNOSIS — Z23 ENCOUNTER FOR IMMUNIZATION: ICD-10-CM

## 2022-11-21 DIAGNOSIS — E66.01 MORBID (SEVERE) OBESITY DUE TO EXCESS CALORIES: ICD-10-CM

## 2022-11-21 PROCEDURE — 99214 OFFICE O/P EST MOD 30 MIN: CPT | Mod: 25

## 2022-11-21 PROCEDURE — G0008: CPT

## 2022-11-21 PROCEDURE — 36415 COLL VENOUS BLD VENIPUNCTURE: CPT

## 2022-11-21 PROCEDURE — 90686 IIV4 VACC NO PRSV 0.5 ML IM: CPT

## 2022-11-22 NOTE — PHYSICAL EXAM
[General Appearance - Alert] : alert [General Appearance - In No Acute Distress] : in no acute distress [Sclera] : the sclera and conjunctiva were normal [Extraocular Movements] : extraocular movements were intact [Outer Ear] : the ears and nose were normal in appearance [Hearing Threshold Finger Rub Not [place]] : hearing was normal [Neck Appearance] : the appearance of the neck was normal [Neck Cervical Mass (___cm)] : no neck mass was observed [Auscultation Breath Sounds / Voice Sounds] : lungs were clear to auscultation bilaterally [Lungs Percussion] : the lungs were normal to percussion [Heart Rate And Rhythm] : heart rate was normal and rhythm regular [Heart Sounds] : normal S1 and S2 [Heart Sounds Gallop] : no gallops [Murmurs] : no murmurs [Heart Sounds Pericardial Friction Rub] : no pericardial rub [Bowel Sounds] : normal bowel sounds [Abdomen Soft] : soft [Abdomen Tenderness] : non-tender [] : no hepato-splenomegaly [Abdomen Mass (___ Cm)] : no abdominal mass palpated [Cervical Lymph Nodes Enlarged Anterior Bilaterally] : anterior cervical [No CVA Tenderness] : no ~M costovertebral angle tenderness [No Spinal Tenderness] : no spinal tenderness [Abnormal Walk] : normal gait [Motor Tone] : muscle strength and tone were normal [No Focal Deficits] : no focal deficits [Oriented To Time, Place, And Person] : oriented to person, place, and time [Affect] : the affect was normal [Impaired Insight] : insight and judgment were intact [FreeTextEntry1] : alert, awake, no focal deficits
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
## 2022-12-03 LAB
24R-OH-CALCIDIOL SERPL-MCNC: 61.3 PG/ML
25(OH)D3 SERPL-MCNC: 42.5 NG/ML
ALBUMIN SERPL ELPH-MCNC: 5.1 G/DL
ALDOSTERONE SERUM: 7.7 NG/DL
ALP BLD-CCNC: 87 U/L
ALP BONE SERPL-MCNC: 15.5 UG/L
ALT SERPL-CCNC: 17 U/L
ANA SER IF-ACNC: NEGATIVE
ANION GAP SERPL CALC-SCNC: 13 MMOL/L
APPEARANCE: CLEAR
AST SERPL-CCNC: 17 U/L
BACTERIA: ABNORMAL
BASOPHILS # BLD AUTO: 0.04 K/UL
BASOPHILS NFR BLD AUTO: 0.4 %
BILIRUB SERPL-MCNC: 0.4 MG/DL
BILIRUBIN URINE: NEGATIVE
BLOOD URINE: NEGATIVE
BUN SERPL-MCNC: 15 MG/DL
C3 SERPL-MCNC: 141 MG/DL
C4 SERPL-MCNC: 50 MG/DL
CALCIUM SERPL-MCNC: 11.2 MG/DL
CALCIUM SERPL-MCNC: 11.2 MG/DL
CHLORIDE SERPL-SCNC: 98 MMOL/L
CHOLEST SERPL-MCNC: 193 MG/DL
CO2 SERPL-SCNC: 29 MMOL/L
COLLAGEN CTX SERPL-MCNC: 138 PG/ML
COLOR: NORMAL
CREAT SERPL-MCNC: 0.69 MG/DL
CREAT SPEC-SCNC: 22 MG/DL
CREAT/PROT UR: 0.2 RATIO
CRP SERPL-MCNC: 4 MG/L
CYSTATIN C SERPL-MCNC: 0.99 MG/L
EGFR: 93 ML/MIN/1.73M2
EOSINOPHIL # BLD AUTO: 0.06 K/UL
EOSINOPHIL NFR BLD AUTO: 0.7 %
ERYTHROCYTE [SEDIMENTATION RATE] IN BLOOD BY WESTERGREN METHOD: 36 MM/HR
ESTIMATED AVERAGE GLUCOSE: 120 MG/DL
FERRITIN SERPL-MCNC: 124 NG/ML
FOLATE SERPL-MCNC: 14.7 NG/ML
GFR/BSA.PRED SERPLBLD CYS-BASED-ARV: 70 ML/MIN/1.73M2
GLUCOSE QUALITATIVE U: NEGATIVE
GLUCOSE SERPL-MCNC: 102 MG/DL
HBA1C MFR BLD HPLC: 5.8 %
HBV SURFACE AB SER QL: NONREACTIVE
HBV SURFACE AG SER QL: NONREACTIVE
HCT VFR BLD CALC: 43.3 %
HCV AB SER QL: NONREACTIVE
HCV S/CO RATIO: 0.09 S/CO
HCYS SERPL-MCNC: 9.5 UMOL/L
HDLC SERPL-MCNC: 54 MG/DL
HGB BLD-MCNC: 14.3 G/DL
HYALINE CASTS: 0 /LPF
IMM GRANULOCYTES NFR BLD AUTO: 0.2 %
IRON SATN MFR SERPL: 12 %
IRON SERPL-MCNC: 46 UG/DL
KETONES URINE: NEGATIVE
LDLC SERPL CALC-MCNC: 101 MG/DL
LEUKOCYTE ESTERASE URINE: ABNORMAL
LYMPHOCYTES # BLD AUTO: 2.38 K/UL
LYMPHOCYTES NFR BLD AUTO: 26 %
MAGNESIUM SERPL-MCNC: 2 MG/DL
MAN DIFF?: NORMAL
MCHC RBC-ENTMCNC: 29.7 PG
MCHC RBC-ENTMCNC: 33 GM/DL
MCV RBC AUTO: 90 FL
METHYLMALONATE SERPL-SCNC: 145 NMOL/L
MICROSCOPIC-UA: NORMAL
MONOCYTES # BLD AUTO: 0.84 K/UL
MONOCYTES NFR BLD AUTO: 9.2 %
NEUTROPHILS # BLD AUTO: 5.82 K/UL
NEUTROPHILS NFR BLD AUTO: 63.5 %
NITRITE URINE: NEGATIVE
NONHDLC SERPL-MCNC: 139 MG/DL
NT-PROBNP SERPL-MCNC: 48 PG/ML
PARATHYROID HORMONE INTACT: 29 PG/ML
PH URINE: 7
PHOSPHATE SERPL-MCNC: 4.3 MG/DL
PLATELET # BLD AUTO: 275 K/UL
POTASSIUM SERPL-SCNC: 4.1 MMOL/L
PROT SERPL-MCNC: 7.9 G/DL
PROT UR-MCNC: 4 MG/DL
PROTEIN URINE: NEGATIVE
RBC # BLD: 4.81 M/UL
RBC # FLD: 13.2 %
RED BLOOD CELLS URINE: 1 /HPF
RHEUMATOID FACT SER QL: <10 IU/ML
SODIUM SERPL-SCNC: 140 MMOL/L
SPECIFIC GRAVITY URINE: 1.01
SQUAMOUS EPITHELIAL CELLS: 8 /HPF
T3FREE SERPL-MCNC: 3.52 PG/ML
T3RU NFR SERPL: 1.1 TBI
T4 FREE SERPL-MCNC: 1.4 NG/DL
T4 SERPL-MCNC: 9.3 UG/DL
THYROGLOB AB SERPL-ACNC: <20 IU/ML
THYROPEROXIDASE AB SERPL IA-ACNC: <10 IU/ML
TIBC SERPL-MCNC: 372 UG/DL
TRIGL SERPL-MCNC: 191 MG/DL
TSH SERPL-ACNC: 1.74 UIU/ML
UIBC SERPL-MCNC: 326 UG/DL
URATE SERPL-MCNC: 4.5 MG/DL
UROBILINOGEN URINE: NORMAL
VIT B12 SERPL-MCNC: 442 PG/ML
WBC # FLD AUTO: 9.16 K/UL
WHITE BLOOD CELLS URINE: 186 /HPF

## 2022-12-19 ENCOUNTER — RESULT CHARGE (OUTPATIENT)
Age: 72
End: 2022-12-19

## 2022-12-20 ENCOUNTER — NON-APPOINTMENT (OUTPATIENT)
Age: 72
End: 2022-12-20

## 2022-12-20 ENCOUNTER — APPOINTMENT (OUTPATIENT)
Dept: HEART AND VASCULAR | Facility: CLINIC | Age: 72
End: 2022-12-20

## 2022-12-20 ENCOUNTER — APPOINTMENT (OUTPATIENT)
Dept: ORTHOPEDIC SURGERY | Facility: CLINIC | Age: 72
End: 2022-12-20

## 2022-12-20 VITALS
TEMPERATURE: 98.3 F | HEIGHT: 62 IN | WEIGHT: 189 LBS | OXYGEN SATURATION: 98 % | HEART RATE: 80 BPM | BODY MASS INDEX: 34.78 KG/M2 | SYSTOLIC BLOOD PRESSURE: 139 MMHG | DIASTOLIC BLOOD PRESSURE: 81 MMHG

## 2022-12-20 PROCEDURE — 93000 ELECTROCARDIOGRAM COMPLETE: CPT

## 2022-12-20 PROCEDURE — 99203 OFFICE O/P NEW LOW 30 MIN: CPT

## 2022-12-20 NOTE — ASSESSMENT
[FreeTextEntry1] : - HTN bp is controlled on valsartan/hctz 160/12.5 mg daily \par - HLD on rosuvastatin 10 mg daily\par - on metformin for prediabetes\par - LVH on echo will repeat for LVH\par - elevated calcium told her to stop her calcium supplements and d/w Dr. Ramires\par - fu in three months with diet and exercise

## 2022-12-20 NOTE — HISTORY OF PRESENT ILLNESS
[FreeTextEntry1] : 72 F HTN Pre- DM HLD Hand Cellulitis c/b sepsis\par \par referred by Dr. Ramires she feels well had knee surgery. she exercises on a bike and she has no chest pain or shortness of breath. she is here for preventative evaluation. \par \par \par \par ecg nsr rbbb lafb 12/20/2022\par 2018 Echo LVH EF normal

## 2023-01-17 ENCOUNTER — RESULT REVIEW (OUTPATIENT)
Age: 73
End: 2023-01-17

## 2023-01-17 ENCOUNTER — APPOINTMENT (OUTPATIENT)
Dept: ORTHOPEDIC SURGERY | Facility: CLINIC | Age: 73
End: 2023-01-17
Payer: MEDICARE

## 2023-01-17 ENCOUNTER — OUTPATIENT (OUTPATIENT)
Dept: OUTPATIENT SERVICES | Facility: HOSPITAL | Age: 73
LOS: 1 days | End: 2023-01-17
Payer: MEDICARE

## 2023-01-17 DIAGNOSIS — M17.10 UNILATERAL PRIMARY OSTEOARTHRITIS, UNSPECIFIED KNEE: ICD-10-CM

## 2023-01-17 DIAGNOSIS — M17.0 BILATERAL PRIMARY OSTEOARTHRITIS OF KNEE: ICD-10-CM

## 2023-01-17 DIAGNOSIS — M17.12 UNILATERAL PRIMARY OSTEOARTHRITIS, LEFT KNEE: ICD-10-CM

## 2023-01-17 DIAGNOSIS — Z98.890 OTHER SPECIFIED POSTPROCEDURAL STATES: Chronic | ICD-10-CM

## 2023-01-17 PROCEDURE — 73564 X-RAY EXAM KNEE 4 OR MORE: CPT | Mod: 26,LT

## 2023-01-17 PROCEDURE — 99213 OFFICE O/P EST LOW 20 MIN: CPT

## 2023-01-17 PROCEDURE — 73564 X-RAY EXAM KNEE 4 OR MORE: CPT

## 2023-01-17 NOTE — ADDENDUM
[FreeTextEntry1] : Documented by Christy Calderon acting as a scribe for Dr. Gil Brantley on 01/17/2023.

## 2023-01-17 NOTE — HISTORY OF PRESENT ILLNESS
[de-identified] : L TKA 8/29/22\par \par 01/17/2023: 73 y/o female presenting about 5 months s/p left TKA overall doing well. Her pain is minimal and she is not taking any analgesics. She is not using ambulatory devices. She is attending PT about 2-3 times a week. She reports that she does feel limb discrepancy in her operative leg which results in imbalance and a fear of falling when walking fast. Overall, she notes improvement in the left knee as well as in her ability to ambulate. She has relatively mild right knee pain that she does not feel requires any invasive management at this time.\par \par 8/9/22: 70y/o female accompanied by her , ref by Dr. Ramires for bilateral knee osteoarthritis. States that symptoms somewhat migratory but currently with pain L>R.  Modifying factors include ambulating up steps and standing for extended periods of time. She also endorses discomfort with walking. She has participated in PT and states it has been somewhat beneficial.  Reports she is unable to visit certain family members due the amount of steps in their home. Patient is employed as as . She is here to discuss TKA.\par \par PMH: HTN, DM (well controlled), HLD.

## 2023-01-17 NOTE — DISCUSSION/SUMMARY
[de-identified] : 73 y/o female approximately 4.5 months s/p left TKA, doing very well with minimally symptomatic right knee osteoarthritis. \par - I explained that the current clinical LLD is due to correction of her varus and flexion contracture deformities from the TKA. Since she still feels that the LLD is symptomatic, I encouraged her to use an OTC shoe insert to help reduce the sensation of imbalance.\par - I encouraged her to continue with PT, HEP, to resume all activities as tolerated, and to use OTC anti-inflammatories as needed. \par - F/u q August with repeat b/l knee XR or earlier as needed. \par - We did discuss that any worsening symptoms in the right knee may merit a conversation regarding more intensive non-invasive modalities or right TKA in the future.

## 2023-01-17 NOTE — HISTORY OF PRESENT ILLNESS
[de-identified] : L TKA 8/29/22\par \par 01/17/2023: 73 y/o female presenting about 5 months s/p left TKA overall doing well. Her pain is minimal and she is not taking any analgesics. She is not using ambulatory devices. She is attending PT about 2-3 times a week. She reports that she does feel limb discrepancy in her operative leg which results in imbalance and a fear of falling when walking fast. Overall, she notes improvement in the left knee as well as in her ability to ambulate. She has relatively mild right knee pain that she does not feel requires any invasive management at this time.\par \par 8/9/22: 70y/o female accompanied by her , ref by Dr. Ramires for bilateral knee osteoarthritis. States that symptoms somewhat migratory but currently with pain L>R.  Modifying factors include ambulating up steps and standing for extended periods of time. She also endorses discomfort with walking. She has participated in PT and states it has been somewhat beneficial.  Reports she is unable to visit certain family members due the amount of steps in their home. Patient is employed as as . She is here to discuss TKA.\par \par PMH: HTN, DM (well controlled), HLD.

## 2023-01-17 NOTE — DISCUSSION/SUMMARY
[de-identified] : 71 y/o female approximately 4.5 months s/p left TKA, doing very well with minimally symptomatic right knee osteoarthritis. \par - I explained that the current clinical LLD is due to correction of her varus and flexion contracture deformities from the TKA. Since she still feels that the LLD is symptomatic, I encouraged her to use an OTC shoe insert to help reduce the sensation of imbalance.\par - I encouraged her to continue with PT, HEP, to resume all activities as tolerated, and to use OTC anti-inflammatories as needed. \par - F/u q August with repeat b/l knee XR or earlier as needed. \par - We did discuss that any worsening symptoms in the right knee may merit a conversation regarding more intensive non-invasive modalities or right TKA in the future.

## 2023-01-17 NOTE — END OF VISIT
[FreeTextEntry3] : All medical record entries made by the Scribe were at my, Dr. Gil Brantley, direction and personally dictated by me on 01/17/2023. I have reviewed the chart and agree that the record accurately reflects my personal performance of the history, physical exam, assessment and plan. I have also personally directed, reviewed, and agreed with the chart.

## 2023-01-17 NOTE — PHYSICAL EXAM
[de-identified] :  General appearance: well nourished and hydrated, pleasant, alert and oriented x 3, cooperative.  \par HEENT: normocephalic, EOM intact, wearing mask, external auditory canal clear.  \par Cardiovascular: no lower leg edema, no varicosities, dorsalis pedis pulses palpable and symmetric.  \par Lymphatics: no palpable lymphadenopathy, no lymphedema.  \par Neurologic: sensation is normal, no muscle weakness in upper or lower extremities, patella tendon reflexes present and symmetric.  \par Dermatologic: skin moist, warm, no rash.  \par Spine: cervical spine with normal lordosis and painless range of motion, thoracic spine with normal kyphosis and painless range of motion, lumbosacral spine with normal lordosis and painless range of motion.  No tenderness to palpation along midline spine and paraspinal musculature.  Sacroiliac joints nontender bilaterally. Negative SLR and crossed SLR tests bilaterally.\par Gait: no assistive device. Minimal left sided antalgia. \par \par Left knee: \par - Focal soft tissue swelling: none\par - Ecchymosis: none\par - Erythema: none\par - Effusion: moderate, no Baker's cyst\par - Wounds: well healed surgical incision, benign appearing \par - Alignment: normal\par - Tenderness: none\par - ROM: 0-135\par - Collateral laxity: none\par - Cruciate laxity: approximately 5-7 mm anterior posterior translation on flexion drawers \par - Popliteal angle (degrees): 20\par - Quad strength: 5/5\par \par Clinical limb length discrepancy of approximately 2-3 mm with the LLE being clinically longer. Right knee does demonstrate varus deformity and mild flexion contracture. [de-identified] : 4 radiographic views were taken of the left knee. These demonstrate left TKA in stable position as compared to previous imaging without evidence of mechanical complication. Patella sits at normal height and tracks centrally.

## 2023-01-17 NOTE — PHYSICAL EXAM
[de-identified] :  General appearance: well nourished and hydrated, pleasant, alert and oriented x 3, cooperative.  \par HEENT: normocephalic, EOM intact, wearing mask, external auditory canal clear.  \par Cardiovascular: no lower leg edema, no varicosities, dorsalis pedis pulses palpable and symmetric.  \par Lymphatics: no palpable lymphadenopathy, no lymphedema.  \par Neurologic: sensation is normal, no muscle weakness in upper or lower extremities, patella tendon reflexes present and symmetric.  \par Dermatologic: skin moist, warm, no rash.  \par Spine: cervical spine with normal lordosis and painless range of motion, thoracic spine with normal kyphosis and painless range of motion, lumbosacral spine with normal lordosis and painless range of motion.  No tenderness to palpation along midline spine and paraspinal musculature.  Sacroiliac joints nontender bilaterally. Negative SLR and crossed SLR tests bilaterally.\par Gait: no assistive device. Minimal left sided antalgia. \par \par Left knee: \par - Focal soft tissue swelling: none\par - Ecchymosis: none\par - Erythema: none\par - Effusion: moderate, no Baker's cyst\par - Wounds: well healed surgical incision, benign appearing \par - Alignment: normal\par - Tenderness: none\par - ROM: 0-135\par - Collateral laxity: none\par - Cruciate laxity: approximately 5-7 mm anterior posterior translation on flexion drawers \par - Popliteal angle (degrees): 20\par - Quad strength: 5/5\par \par Clinical limb length discrepancy of approximately 2-3 mm with the LLE being clinically longer. Right knee does demonstrate varus deformity and mild flexion contracture. [de-identified] : 4 radiographic views were taken of the left knee. These demonstrate left TKA in stable position as compared to previous imaging without evidence of mechanical complication. Patella sits at normal height and tracks centrally.

## 2023-03-02 NOTE — HISTORY OF PRESENT ILLNESS
Unknown [FreeTextEntry1] : The patient is a 69 year old female presenting today for follow-up evaluation of HTN, DM, and HLD.\par \par The patient states she is worried of the resurgence of COVID infection rates in her community, as well as the overall low level of COVID precaution that others in her community take. She otherwise is feeling well and denies any new medical concerns.\par \par The patient requests an annual influenza vaccine today. She denies an allergy to eggs. \par \par Labs from 5/13/20 reveal: WBC 7.2, HGB 13.7, HCT 41.2, platelet 220k, glucose 106, Na 140, K 4.1, Cl 98, CO2 of 27, ferritin 159. Patient is positive for COVID antibodies 5/13; found negative for COVID antibodies 7/27.\par \par Current Medications: Crestor 10mg QD, Vitamin D 1000 units QD, losartan 50mg QD

## 2023-05-15 ENCOUNTER — APPOINTMENT (OUTPATIENT)
Dept: NEPHROLOGY | Facility: CLINIC | Age: 73
End: 2023-05-15
Payer: MEDICARE

## 2023-05-15 ENCOUNTER — LABORATORY RESULT (OUTPATIENT)
Age: 73
End: 2023-05-15

## 2023-05-15 VITALS — BODY MASS INDEX: 35.85 KG/M2 | WEIGHT: 195.99 LBS

## 2023-05-15 DIAGNOSIS — F43.9 REACTION TO SEVERE STRESS, UNSPECIFIED: ICD-10-CM

## 2023-05-15 DIAGNOSIS — L02.511 CUTANEOUS ABSCESS OF RIGHT HAND: ICD-10-CM

## 2023-05-15 DIAGNOSIS — M54.50 LOW BACK PAIN, UNSPECIFIED: ICD-10-CM

## 2023-05-15 DIAGNOSIS — E78.5 HYPERLIPIDEMIA, UNSPECIFIED: ICD-10-CM

## 2023-05-15 PROCEDURE — 36415 COLL VENOUS BLD VENIPUNCTURE: CPT

## 2023-05-15 PROCEDURE — 99214 OFFICE O/P EST MOD 30 MIN: CPT | Mod: 25

## 2023-05-18 ENCOUNTER — APPOINTMENT (OUTPATIENT)
Dept: HEART AND VASCULAR | Facility: CLINIC | Age: 73
End: 2023-05-18
Payer: MEDICARE

## 2023-05-18 VITALS
WEIGHT: 192 LBS | TEMPERATURE: 97.9 F | BODY MASS INDEX: 35.33 KG/M2 | RESPIRATION RATE: 16 BRPM | SYSTOLIC BLOOD PRESSURE: 148 MMHG | OXYGEN SATURATION: 98 % | HEIGHT: 62 IN | HEART RATE: 71 BPM | DIASTOLIC BLOOD PRESSURE: 82 MMHG

## 2023-05-18 DIAGNOSIS — R73.03 PREDIABETES.: ICD-10-CM

## 2023-05-18 PROCEDURE — 93000 ELECTROCARDIOGRAM COMPLETE: CPT

## 2023-05-18 PROCEDURE — 93306 TTE W/DOPPLER COMPLETE: CPT

## 2023-05-18 PROCEDURE — 99214 OFFICE O/P EST MOD 30 MIN: CPT

## 2023-05-20 LAB
ALBUMIN MFR SERPL ELPH: 60.9 %
ALBUMIN SERPL ELPH-MCNC: 4.5 G/DL
ALBUMIN SERPL-MCNC: 4.1 G/DL
ALBUMIN/GLOB SERPL: 1.5 RATIO
ALP BLD-CCNC: 85 U/L
ALPHA1 GLOB MFR SERPL ELPH: 4.2 %
ALPHA1 GLOB SERPL ELPH-MCNC: 0.3 G/DL
ALPHA2 GLOB MFR SERPL ELPH: 10.2 %
ALPHA2 GLOB SERPL ELPH-MCNC: 0.7 G/DL
ALT SERPL-CCNC: 12 U/L
ANA SER IF-ACNC: NEGATIVE
ANION GAP SERPL CALC-SCNC: 16 MMOL/L
APPEARANCE: CLEAR
AST SERPL-CCNC: 18 U/L
B-GLOBULIN MFR SERPL ELPH: 12.6 %
B-GLOBULIN SERPL ELPH-MCNC: 0.9 G/DL
BACTERIA: NEGATIVE /HPF
BASOPHILS # BLD AUTO: 0.05 K/UL
BASOPHILS NFR BLD AUTO: 0.6 %
BILIRUB SERPL-MCNC: 0.3 MG/DL
BILIRUBIN URINE: NEGATIVE
BLOOD URINE: NEGATIVE
BUN SERPL-MCNC: 20 MG/DL
C3 SERPL-MCNC: 128 MG/DL
C4 SERPL-MCNC: 39 MG/DL
CALCIUM SERPL-MCNC: 9.8 MG/DL
CAST: 0 /LPF
CHLORIDE SERPL-SCNC: 106 MMOL/L
CHOLEST SERPL-MCNC: 164 MG/DL
CO2 SERPL-SCNC: 22 MMOL/L
COLOR: YELLOW
CREAT SERPL-MCNC: 0.66 MG/DL
CREAT SPEC-SCNC: 71 MG/DL
CREAT/PROT UR: 0.1 RATIO
CRP SERPL-MCNC: <3 MG/L
CYSTATIN C SERPL-MCNC: 0.98 MG/L
DEPRECATED KAPPA LC FREE/LAMBDA SER: 1.15 RATIO
EGFR: 93 ML/MIN/1.73M2
EOSINOPHIL # BLD AUTO: 0.09 K/UL
EOSINOPHIL NFR BLD AUTO: 1.2 %
EPITHELIAL CELLS: 8 /HPF
ERYTHROCYTE [SEDIMENTATION RATE] IN BLOOD BY WESTERGREN METHOD: 23 MM/HR
ESTIMATED AVERAGE GLUCOSE: 123 MG/DL
FERRITIN SERPL-MCNC: 75 NG/ML
FOLATE SERPL-MCNC: 18.2 NG/ML
GAMMA GLOB FLD ELPH-MCNC: 0.8 G/DL
GAMMA GLOB MFR SERPL ELPH: 12.1 %
GFR/BSA.PRED SERPLBLD CYS-BASED-ARV: 71 ML/MIN/1.73M2
GLUCOSE QUALITATIVE U: NEGATIVE MG/DL
GLUCOSE SERPL-MCNC: 98 MG/DL
HBA1C MFR BLD HPLC: 5.9 %
HBV SURFACE AB SER QL: NONREACTIVE
HBV SURFACE AG SER QL: NONREACTIVE
HCT VFR BLD CALC: 39.4 %
HCV AB SER QL: NONREACTIVE
HCV S/CO RATIO: 0.09 S/CO
HCYS SERPL-MCNC: 10.2 UMOL/L
HDLC SERPL-MCNC: 45 MG/DL
HGB BLD-MCNC: 13.1 G/DL
IGA SER QL IEP: 173 MG/DL
IGG SER QL IEP: 720 MG/DL
IGM SER QL IEP: 118 MG/DL
IMM GRANULOCYTES NFR BLD AUTO: 0.3 %
INTERPRETATION SERPL IEP-IMP: NORMAL
IRON SATN MFR SERPL: 21 %
IRON SERPL-MCNC: 71 UG/DL
KAPPA LC CSF-MCNC: 2.56 MG/DL
KAPPA LC SERPL-MCNC: 2.95 MG/DL
KETONES URINE: NEGATIVE MG/DL
LDLC SERPL CALC-MCNC: 59 MG/DL
LEUKOCYTE ESTERASE URINE: ABNORMAL
LYMPHOCYTES # BLD AUTO: 1.98 K/UL
LYMPHOCYTES NFR BLD AUTO: 25.4 %
M PROTEIN SPEC IFE-MCNC: NORMAL
MAGNESIUM SERPL-MCNC: 2 MG/DL
MAN DIFF?: NORMAL
MCHC RBC-ENTMCNC: 30.1 PG
MCHC RBC-ENTMCNC: 33.2 GM/DL
MCV RBC AUTO: 90.6 FL
MICROSCOPIC-UA: NORMAL
MONOCYTES # BLD AUTO: 0.58 K/UL
MONOCYTES NFR BLD AUTO: 7.4 %
NEUTROPHILS # BLD AUTO: 5.08 K/UL
NEUTROPHILS NFR BLD AUTO: 65.1 %
NITRITE URINE: NEGATIVE
NONHDLC SERPL-MCNC: 119 MG/DL
PH URINE: 6
PHOSPHATE SERPL-MCNC: 3.9 MG/DL
PLATELET # BLD AUTO: 254 K/UL
POTASSIUM SERPL-SCNC: 4.4 MMOL/L
PROT SERPL-MCNC: 6.8 G/DL
PROT UR-MCNC: 8 MG/DL
PROTEIN URINE: NEGATIVE MG/DL
RBC # BLD: 4.35 M/UL
RBC # FLD: 13 %
RED BLOOD CELLS URINE: 2 /HPF
REVIEW: NORMAL
RHEUMATOID FACT SER QL: <10 IU/ML
SODIUM SERPL-SCNC: 144 MMOL/L
SPECIFIC GRAVITY URINE: 1.02
T3FREE SERPL-MCNC: 2.79 PG/ML
T3RU NFR SERPL: 1.1 TBI
T4 FREE SERPL-MCNC: 1.2 NG/DL
T4 SERPL-MCNC: 7.6 UG/DL
THYROGLOB AB SERPL-ACNC: <20 IU/ML
THYROPEROXIDASE AB SERPL IA-ACNC: <10 IU/ML
TIBC SERPL-MCNC: 335 UG/DL
TRIGL SERPL-MCNC: 298 MG/DL
TSH SERPL-ACNC: 1.9 UIU/ML
UIBC SERPL-MCNC: 264 UG/DL
URATE SERPL-MCNC: 4.7 MG/DL
UROBILINOGEN URINE: 0.2 MG/DL
VIT B12 SERPL-MCNC: 390 PG/ML
WBC # FLD AUTO: 7.8 K/UL
WHITE BLOOD CELLS URINE: 0 /HPF

## 2023-05-22 LAB — METHYLMALONATE SERPL-SCNC: 173 NMOL/L

## 2023-05-22 NOTE — HISTORY OF PRESENT ILLNESS
[FreeTextEntry1] : 72 F HTN Pre- DM HLD Hand Cellulitis c/b sepsis\par \par referred by Dr. Ramires she feels well had knee surgery. she exercises on a bike and she has no chest pain or shortness of breath. she is here for preventative evaluation. \par \par \par \par ecg nsr rbbb lafb 12/20/2022\par 2023 Echo LVH EF normal

## 2023-05-22 NOTE — ASSESSMENT
[FreeTextEntry1] : - HTN bp is controlled on valsartan/hctz 160/12.5 mg daily \par - HLD on rosuvastatin 10 mg daily\par - on metformin for prediabetes\par - LVH on echo septum today is 1.5 cm ? out of proportion to her weight and BP may consider pyp scan \par - elevated calcium told her to stop her calcium supplements and d/w Dr. Ramires calcium is much better\par - ozempic for preDM \par - fu in three months with diet and exercise

## 2023-06-01 ENCOUNTER — NON-APPOINTMENT (OUTPATIENT)
Age: 73
End: 2023-06-01

## 2023-06-20 ENCOUNTER — RX RENEWAL (OUTPATIENT)
Age: 73
End: 2023-06-20

## 2023-08-08 ENCOUNTER — NON-APPOINTMENT (OUTPATIENT)
Age: 73
End: 2023-08-08

## 2023-08-08 ENCOUNTER — APPOINTMENT (OUTPATIENT)
Dept: HEART AND VASCULAR | Facility: CLINIC | Age: 73
End: 2023-08-08
Payer: MEDICARE

## 2023-08-08 VITALS
HEART RATE: 70 BPM | HEIGHT: 62 IN | OXYGEN SATURATION: 98 % | BODY MASS INDEX: 33.49 KG/M2 | WEIGHT: 181.99 LBS | TEMPERATURE: 98.5 F | DIASTOLIC BLOOD PRESSURE: 76 MMHG | SYSTOLIC BLOOD PRESSURE: 120 MMHG

## 2023-08-08 PROCEDURE — 93000 ELECTROCARDIOGRAM COMPLETE: CPT

## 2023-08-08 PROCEDURE — 99214 OFFICE O/P EST MOD 30 MIN: CPT

## 2023-08-08 RX ORDER — APIXABAN 5 MG/1
5 TABLET, FILM COATED ORAL
Qty: 180 | Refills: 3 | Status: ACTIVE | COMMUNITY
Start: 2023-08-04 | End: 1900-01-01

## 2023-08-08 RX ORDER — METOPROLOL TARTRATE 25 MG/1
25 TABLET, FILM COATED ORAL DAILY
Qty: 30 | Refills: 0 | Status: DISCONTINUED | COMMUNITY
Start: 2023-08-04 | End: 2023-08-08

## 2023-08-08 RX ORDER — VALSARTAN AND HYDROCHLOROTHIAZIDE 160; 12.5 MG/1; MG/1
160-12.5 TABLET, FILM COATED ORAL DAILY
Qty: 90 | Refills: 2 | Status: DISCONTINUED | COMMUNITY
End: 2023-08-08

## 2023-08-08 NOTE — HISTORY OF PRESENT ILLNESS
[FreeTextEntry1] : 72 F HTN Pre- DM HLD Hand Cellulitis c/b sepsis  woke up friday am with palpitations she did not feel well and felt dizzy went to urgent care EKG done showed rapid afib. lasted about four hours. may have had another episode that day at 1 pm. did not last long.    ecg nsr rbbb lafb 8/8/2023 2023 Echo LVH EF normal

## 2023-08-08 NOTE — ASSESSMENT
[FreeTextEntry1] : - afib chads vasc score is 3 she is on eliquis 5 bid and lopressor 25 bid for now she will think about ablation - HTN bp is lower upon standing she is dizzy will change to valsartan 160 mg daily no hctz - HLD on rosuvastatin 10 mg daily - on metformin for prediabetes - LVH on echo septum today is 1.5 cm ? out of proportion to her weight and BP may consider pyp scan  - ozempic for preDM  - fu in three months with diet and exercise

## 2023-08-09 ENCOUNTER — TRANSCRIPTION ENCOUNTER (OUTPATIENT)
Age: 73
End: 2023-08-09

## 2023-08-09 LAB
ALBUMIN SERPL ELPH-MCNC: 4.8 G/DL
ALP BLD-CCNC: 75 U/L
ALT SERPL-CCNC: 12 U/L
ANION GAP SERPL CALC-SCNC: 15 MMOL/L
AST SERPL-CCNC: 15 U/L
BILIRUB SERPL-MCNC: 0.4 MG/DL
BUN SERPL-MCNC: 18 MG/DL
CALCIUM SERPL-MCNC: 10.3 MG/DL
CHLORIDE SERPL-SCNC: 100 MMOL/L
CO2 SERPL-SCNC: 26 MMOL/L
CREAT SERPL-MCNC: 0.72 MG/DL
EGFR: 89 ML/MIN/1.73M2
GLUCOSE SERPL-MCNC: 85 MG/DL
NT-PROBNP SERPL-MCNC: 61 PG/ML
POTASSIUM SERPL-SCNC: 4.6 MMOL/L
PROT SERPL-MCNC: 7 G/DL
SODIUM SERPL-SCNC: 141 MMOL/L
T4 FREE SERPL-MCNC: 1.3 NG/DL
TSH SERPL-ACNC: 1.31 UIU/ML

## 2023-08-15 ENCOUNTER — TRANSCRIPTION ENCOUNTER (OUTPATIENT)
Age: 73
End: 2023-08-15

## 2023-08-15 RX ORDER — METOPROLOL TARTRATE 25 MG/1
25 TABLET, FILM COATED ORAL TWICE DAILY
Qty: 180 | Refills: 3 | Status: DISCONTINUED | COMMUNITY
Start: 2023-08-08 | End: 2023-08-15

## 2023-08-16 ENCOUNTER — APPOINTMENT (OUTPATIENT)
Dept: HEART AND VASCULAR | Facility: CLINIC | Age: 73
End: 2023-08-16

## 2023-08-16 ENCOUNTER — LABORATORY RESULT (OUTPATIENT)
Age: 73
End: 2023-08-16

## 2023-08-16 ENCOUNTER — APPOINTMENT (OUTPATIENT)
Dept: ENDOCRINOLOGY | Facility: CLINIC | Age: 73
End: 2023-08-16

## 2023-08-16 ENCOUNTER — APPOINTMENT (OUTPATIENT)
Dept: NEPHROLOGY | Facility: CLINIC | Age: 73
End: 2023-08-16
Payer: MEDICARE

## 2023-08-16 VITALS — DIASTOLIC BLOOD PRESSURE: 80 MMHG | HEART RATE: 72 BPM | SYSTOLIC BLOOD PRESSURE: 130 MMHG

## 2023-08-16 VITALS — SYSTOLIC BLOOD PRESSURE: 120 MMHG | DIASTOLIC BLOOD PRESSURE: 80 MMHG | HEART RATE: 84 BPM

## 2023-08-16 VITALS — WEIGHT: 185.19 LBS | BODY MASS INDEX: 33.87 KG/M2

## 2023-08-16 DIAGNOSIS — Z63.79 OTHER STRESSFUL LIFE EVENTS AFFECTING FAMILY AND HOUSEHOLD: ICD-10-CM

## 2023-08-16 DIAGNOSIS — R39.9 UNSPECIFIED SYMPTOMS AND SIGNS INVOLVING THE GENITOURINARY SYSTEM: ICD-10-CM

## 2023-08-16 PROCEDURE — 99214 OFFICE O/P EST MOD 30 MIN: CPT | Mod: 25

## 2023-08-16 PROCEDURE — 36415 COLL VENOUS BLD VENIPUNCTURE: CPT

## 2023-08-17 PROBLEM — R39.9 LOWER URINARY TRACT SYMPTOMS (LUTS): Status: ACTIVE | Noted: 2019-07-17

## 2023-08-17 PROBLEM — Z63.79 STRESS DUE TO ILLNESS OF FAMILY MEMBER: Status: ACTIVE | Noted: 2021-01-11

## 2023-08-17 NOTE — ADDENDUM
[FreeTextEntry1] : BP/HR taken in different positions (sitting standing and lying down) and d/w pt Self-monitoring at home advised i.e. BP, FS, etc. Medications updated Recent lab results reviewed Diet/healthy lifestyle counselling Labs drawn yesterday New Urine culture ordered Follow up in few weeks.

## 2023-08-17 NOTE — HISTORY OF PRESENT ILLNESS
[FreeTextEntry1] : 71 y/o F with PMHX of AFIB, DM, hypercholesterolemia, HLD, HTN, low back pain, obesity, post-traumatic stress disorder, OA right knee, stress syndrome, thyromegaly, Vit D deficiency being seen for follow up visit.   Patient is doing well., exercising and eating less Lost 10 lbs., loss her appetite recently. from 199-185 On Ozempic 1 mg dose/week started end of June  ON chronic anticoagulation w/ Eliquis for ventricular rate control presently on Toprol, Eliquis bid Had total 3x episode of tachycardia 140s. Ablation at [facility]-   To see ENDO appt today  Bone density done last April.

## 2023-08-21 NOTE — OCCUPATIONAL THERAPY INITIAL EVALUATION ADULT - REFERRAL TO ANOTHER SERVICE NEEDED, OT EVAL
PT following Finasteride Pregnancy And Lactation Text: This medication is absolutely contraindicated during pregnancy. It is unknown if it is excreted in breast milk.

## 2023-08-23 ENCOUNTER — RX RENEWAL (OUTPATIENT)
Age: 73
End: 2023-08-23

## 2023-08-23 RX ORDER — METFORMIN HYDROCHLORIDE 500 MG/1
500 TABLET, COATED ORAL
Qty: 180 | Refills: 3 | Status: ACTIVE | COMMUNITY
Start: 2018-11-14 | End: 1900-01-01

## 2023-08-30 ENCOUNTER — OUTPATIENT (OUTPATIENT)
Dept: OUTPATIENT SERVICES | Facility: HOSPITAL | Age: 73
LOS: 1 days | End: 2023-08-30
Payer: MEDICARE

## 2023-08-30 ENCOUNTER — APPOINTMENT (OUTPATIENT)
Dept: SLEEP CENTER | Facility: HOME HEALTH | Age: 73
End: 2023-08-30
Payer: MEDICARE

## 2023-08-30 DIAGNOSIS — Z98.890 OTHER SPECIFIED POSTPROCEDURAL STATES: Chronic | ICD-10-CM

## 2023-08-30 PROCEDURE — 95800 SLP STDY UNATTENDED: CPT

## 2023-08-30 PROCEDURE — G0400: CPT | Mod: 26

## 2023-09-01 DIAGNOSIS — G47.33 OBSTRUCTIVE SLEEP APNEA (ADULT) (PEDIATRIC): ICD-10-CM

## 2023-09-04 LAB
APPEARANCE: ABNORMAL
BILIRUBIN URINE: NEGATIVE
BLOOD URINE: NEGATIVE
COLOR: YELLOW
GLUCOSE QUALITATIVE U: NEGATIVE MG/DL
KETONES URINE: NEGATIVE MG/DL
LEUKOCYTE ESTERASE URINE: ABNORMAL
NITRITE URINE: NEGATIVE
PH URINE: 7.5
PROTEIN URINE: NORMAL MG/DL
SPECIFIC GRAVITY URINE: 1.02
UROBILINOGEN URINE: 1 MG/DL

## 2023-09-07 ENCOUNTER — TRANSCRIPTION ENCOUNTER (OUTPATIENT)
Age: 73
End: 2023-09-07

## 2023-09-11 ENCOUNTER — TRANSCRIPTION ENCOUNTER (OUTPATIENT)
Age: 73
End: 2023-09-11

## 2023-09-20 ENCOUNTER — TRANSCRIPTION ENCOUNTER (OUTPATIENT)
Age: 73
End: 2023-09-20

## 2023-09-28 ENCOUNTER — APPOINTMENT (OUTPATIENT)
Dept: HEART AND VASCULAR | Facility: CLINIC | Age: 73
End: 2023-09-28
Payer: MEDICARE

## 2023-09-28 VITALS
WEIGHT: 180 LBS | HEIGHT: 62 IN | SYSTOLIC BLOOD PRESSURE: 153 MMHG | BODY MASS INDEX: 33.13 KG/M2 | DIASTOLIC BLOOD PRESSURE: 70 MMHG | HEART RATE: 70 BPM

## 2023-09-28 PROCEDURE — 99204 OFFICE O/P NEW MOD 45 MIN: CPT

## 2023-09-28 PROCEDURE — 93000 ELECTROCARDIOGRAM COMPLETE: CPT

## 2023-09-28 PROCEDURE — ZZZZZ: CPT | Mod: NC

## 2023-10-08 ENCOUNTER — APPOINTMENT (OUTPATIENT)
Dept: HEART AND VASCULAR | Facility: CLINIC | Age: 73
End: 2023-10-08
Payer: MEDICARE

## 2023-10-08 PROCEDURE — 93248 EXT ECG>7D<15D REV&INTERPJ: CPT

## 2023-10-09 ENCOUNTER — TRANSCRIPTION ENCOUNTER (OUTPATIENT)
Age: 73
End: 2023-10-09

## 2023-10-10 ENCOUNTER — APPOINTMENT (OUTPATIENT)
Dept: ORTHOPEDIC SURGERY | Facility: CLINIC | Age: 73
End: 2023-10-10
Payer: MEDICARE

## 2023-10-10 ENCOUNTER — APPOINTMENT (OUTPATIENT)
Dept: ENDOCRINOLOGY | Facility: CLINIC | Age: 73
End: 2023-10-10

## 2023-10-10 ENCOUNTER — OUTPATIENT (OUTPATIENT)
Dept: OUTPATIENT SERVICES | Facility: HOSPITAL | Age: 73
LOS: 1 days | End: 2023-10-10
Payer: MEDICARE

## 2023-10-10 ENCOUNTER — APPOINTMENT (OUTPATIENT)
Dept: ORTHOPEDIC SURGERY | Facility: CLINIC | Age: 73
End: 2023-10-10

## 2023-10-10 ENCOUNTER — RESULT REVIEW (OUTPATIENT)
Age: 73
End: 2023-10-10

## 2023-10-10 VITALS
SYSTOLIC BLOOD PRESSURE: 155 MMHG | HEART RATE: 62 BPM | DIASTOLIC BLOOD PRESSURE: 85 MMHG | WEIGHT: 180 LBS | OXYGEN SATURATION: 97 % | HEIGHT: 62 IN | BODY MASS INDEX: 33.13 KG/M2

## 2023-10-10 DIAGNOSIS — Z98.890 OTHER SPECIFIED POSTPROCEDURAL STATES: Chronic | ICD-10-CM

## 2023-10-10 DIAGNOSIS — Z96.652 AFTERCARE FOLLOWING JOINT REPLACEMENT SURGERY: ICD-10-CM

## 2023-10-10 DIAGNOSIS — Z47.1 AFTERCARE FOLLOWING JOINT REPLACEMENT SURGERY: ICD-10-CM

## 2023-10-10 DIAGNOSIS — M17.11 UNILATERAL PRIMARY OSTEOARTHRITIS, RIGHT KNEE: ICD-10-CM

## 2023-10-10 PROCEDURE — 99213 OFFICE O/P EST LOW 20 MIN: CPT

## 2023-10-10 PROCEDURE — 73564 X-RAY EXAM KNEE 4 OR MORE: CPT

## 2023-10-10 PROCEDURE — 73564 X-RAY EXAM KNEE 4 OR MORE: CPT | Mod: 26,50

## 2023-10-12 ENCOUNTER — APPOINTMENT (OUTPATIENT)
Dept: PULMONOLOGY | Facility: CLINIC | Age: 73
End: 2023-10-12

## 2023-10-24 ENCOUNTER — NON-APPOINTMENT (OUTPATIENT)
Age: 73
End: 2023-10-24

## 2023-11-02 ENCOUNTER — APPOINTMENT (OUTPATIENT)
Dept: HEART AND VASCULAR | Facility: CLINIC | Age: 73
End: 2023-11-02
Payer: MEDICARE

## 2023-11-02 ENCOUNTER — NON-APPOINTMENT (OUTPATIENT)
Age: 73
End: 2023-11-02

## 2023-11-02 VITALS
BODY MASS INDEX: 33.31 KG/M2 | DIASTOLIC BLOOD PRESSURE: 81 MMHG | HEIGHT: 62 IN | HEART RATE: 65 BPM | SYSTOLIC BLOOD PRESSURE: 151 MMHG | WEIGHT: 181 LBS | OXYGEN SATURATION: 99 %

## 2023-11-02 VITALS
WEIGHT: 181 LBS | OXYGEN SATURATION: 98 % | BODY MASS INDEX: 33.31 KG/M2 | HEIGHT: 62 IN | DIASTOLIC BLOOD PRESSURE: 88 MMHG | SYSTOLIC BLOOD PRESSURE: 152 MMHG | HEART RATE: 74 BPM | TEMPERATURE: 97.9 F

## 2023-11-02 PROCEDURE — 99214 OFFICE O/P EST MOD 30 MIN: CPT

## 2023-11-02 PROCEDURE — 93000 ELECTROCARDIOGRAM COMPLETE: CPT

## 2023-11-02 RX ORDER — METOPROLOL SUCCINATE 25 MG/1
25 TABLET, EXTENDED RELEASE ORAL
Qty: 180 | Refills: 2 | Status: ACTIVE | COMMUNITY
Start: 2023-08-15 | End: 1900-01-01

## 2023-12-07 ENCOUNTER — LABORATORY RESULT (OUTPATIENT)
Age: 73
End: 2023-12-07

## 2023-12-07 ENCOUNTER — APPOINTMENT (OUTPATIENT)
Dept: NEPHROLOGY | Facility: CLINIC | Age: 73
End: 2023-12-07
Payer: MEDICARE

## 2023-12-07 VITALS — SYSTOLIC BLOOD PRESSURE: 120 MMHG | DIASTOLIC BLOOD PRESSURE: 80 MMHG

## 2023-12-07 VITALS
WEIGHT: 177 LBS | DIASTOLIC BLOOD PRESSURE: 80 MMHG | BODY MASS INDEX: 32.37 KG/M2 | SYSTOLIC BLOOD PRESSURE: 136 MMHG | HEART RATE: 72 BPM

## 2023-12-07 DIAGNOSIS — M25.519 PAIN IN UNSPECIFIED SHOULDER: ICD-10-CM

## 2023-12-07 PROCEDURE — 36415 COLL VENOUS BLD VENIPUNCTURE: CPT

## 2023-12-07 PROCEDURE — 99214 OFFICE O/P EST MOD 30 MIN: CPT | Mod: 25

## 2024-01-03 RX ORDER — GABAPENTIN 100 MG/1
100 CAPSULE ORAL
Qty: 360 | Refills: 11 | Status: ACTIVE | COMMUNITY
Start: 2023-12-27 | End: 1900-01-01

## 2024-02-20 NOTE — DISCHARGE NOTE PROVIDER - NSDCHHHOMEBOUND_GEN_ALL_CORE
-- Linda Austin    Preferred Pharmacy: Pharmacy  [id number] [place], [place] - 1720 Hemet Global Medical Center Fall risk

## 2024-02-29 ENCOUNTER — APPOINTMENT (OUTPATIENT)
Dept: NEPHROLOGY | Facility: CLINIC | Age: 74
End: 2024-02-29

## 2024-02-29 ENCOUNTER — RX RENEWAL (OUTPATIENT)
Age: 74
End: 2024-02-29

## 2024-03-31 LAB
24R-OH-CALCIDIOL SERPL-MCNC: 44.5 PG/ML
25(OH)D3 SERPL-MCNC: 49.4 NG/ML
ALBUMIN MFR SERPL ELPH: 62.6 %
ALBUMIN SERPL ELPH-MCNC: 4.4 G/DL
ALBUMIN SERPL-MCNC: 4.1 G/DL
ALBUMIN/GLOB SERPL: 1.6 RATIO
ALP BLD-CCNC: 74 U/L
ALP BONE SERPL-MCNC: 12 UG/L
ALPHA1 GLOB MFR SERPL ELPH: 4.3 %
ALPHA1 GLOB SERPL ELPH-MCNC: 0.3 G/DL
ALPHA2 GLOB MFR SERPL ELPH: 10.8 %
ALPHA2 GLOB SERPL ELPH-MCNC: 0.7 G/DL
ALT SERPL-CCNC: 13 U/L
ANION GAP SERPL CALC-SCNC: 12 MMOL/L
APPEARANCE: ABNORMAL
AST SERPL-CCNC: 16 U/L
B-GLOBULIN MFR SERPL ELPH: 11.1 %
B-GLOBULIN SERPL ELPH-MCNC: 0.7 G/DL
BACTERIA: ABNORMAL /HPF
BILIRUB SERPL-MCNC: 0.4 MG/DL
BILIRUBIN URINE: NEGATIVE
BLOOD URINE: NEGATIVE
BUN SERPL-MCNC: 13 MG/DL
CALCIUM SERPL-MCNC: 10.2 MG/DL
CALCIUM SERPL-MCNC: 10.2 MG/DL
CAST: 2 /LPF
CHLORIDE SERPL-SCNC: 103 MMOL/L
CHOLEST SERPL-MCNC: 118 MG/DL
CO2 SERPL-SCNC: 26 MMOL/L
COLLAGEN CTX SERPL-MCNC: 115 PG/ML
COLOR: YELLOW
CREAT SERPL-MCNC: 0.68 MG/DL
CYSTATIN C SERPL-MCNC: 0.88 MG/L
DEPRECATED KAPPA LC FREE/LAMBDA SER: 1.29 RATIO
EGFR: 92 ML/MIN/1.73M2
EPITHELIAL CELLS: 21 /HPF
FERRITIN SERPL-MCNC: 111 NG/ML
FOLATE SERPL-MCNC: 16.5 NG/ML
GAMMA GLOB FLD ELPH-MCNC: 0.7 G/DL
GAMMA GLOB MFR SERPL ELPH: 11.2 %
GFR/BSA.PRED SERPLBLD CYS-BASED-ARV: 82 ML/MIN/1.73M2
GLUCOSE QUALITATIVE U: NEGATIVE MG/DL
GLUCOSE SERPL-MCNC: 95 MG/DL
HCT VFR BLD CALC: 40.3 %
HCYS SERPL-MCNC: 10 UMOL/L
HDLC SERPL-MCNC: 45 MG/DL
HGB BLD-MCNC: 13 G/DL
IGA SER QL IEP: 168 MG/DL
IGG SER QL IEP: 721 MG/DL
IGM SER QL IEP: 106 MG/DL
INTERPRETATION SERPL IEP-IMP: NORMAL
IRON SATN MFR SERPL: 13 %
IRON SERPL-MCNC: 42 UG/DL
KAPPA LC CSF-MCNC: 2.24 MG/DL
KAPPA LC SERPL-MCNC: 2.88 MG/DL
KETONES URINE: NEGATIVE MG/DL
LDLC SERPL CALC-MCNC: 50 MG/DL
LEUKOCYTE ESTERASE URINE: ABNORMAL
M PROTEIN SPEC IFE-MCNC: NORMAL
MAGNESIUM SERPL-MCNC: 2 MG/DL
MCHC RBC-ENTMCNC: 29.2 PG
MCHC RBC-ENTMCNC: 32.3 GM/DL
MCV RBC AUTO: 90.6 FL
METHYLMALONATE SERPL-SCNC: 147 NMOL/L
MICROSCOPIC-UA: NORMAL
NITRITE URINE: NEGATIVE
NONHDLC SERPL-MCNC: 73 MG/DL
PARATHYROID HORMONE INTACT: 27 PG/ML
PH URINE: 8
PHOSPHATE SERPL-MCNC: 4 MG/DL
PLATELET # BLD AUTO: 239 K/UL
POTASSIUM SERPL-SCNC: 4.4 MMOL/L
PROT SERPL-MCNC: 6.6 G/DL
PROTEIN URINE: NEGATIVE MG/DL
RBC # BLD: 4.45 M/UL
RBC # FLD: 13.2 %
RED BLOOD CELLS URINE: 2 /HPF
SODIUM SERPL-SCNC: 141 MMOL/L
SPECIFIC GRAVITY URINE: 1.01
T3FREE SERPL-MCNC: 3 PG/ML
T3RU NFR SERPL: 1 TBI
T4 FREE SERPL-MCNC: 1.4 NG/DL
T4 SERPL-MCNC: 9 UG/DL
THYROGLOB AB SERPL-ACNC: <20 IU/ML
THYROPEROXIDASE AB SERPL IA-ACNC: <10 IU/ML
TIBC SERPL-MCNC: 320 UG/DL
TRIGL SERPL-MCNC: 133 MG/DL
TSH SERPL-ACNC: 1.45 UIU/ML
UIBC SERPL-MCNC: 279 UG/DL
URATE SERPL-MCNC: 4.3 MG/DL
UROBILINOGEN URINE: 0.2 MG/DL
VIT B12 SERPL-MCNC: 305 PG/ML
WBC # FLD AUTO: 8.17 K/UL
WHITE BLOOD CELLS URINE: 12 /HPF

## 2024-05-23 ENCOUNTER — NON-APPOINTMENT (OUTPATIENT)
Age: 74
End: 2024-05-23

## 2024-05-23 ENCOUNTER — APPOINTMENT (OUTPATIENT)
Dept: HEART AND VASCULAR | Facility: CLINIC | Age: 74
End: 2024-05-23
Payer: MEDICARE

## 2024-05-23 VITALS
HEART RATE: 54 BPM | BODY MASS INDEX: 33.13 KG/M2 | HEIGHT: 62 IN | DIASTOLIC BLOOD PRESSURE: 70 MMHG | OXYGEN SATURATION: 98 % | WEIGHT: 180 LBS | TEMPERATURE: 98 F | SYSTOLIC BLOOD PRESSURE: 160 MMHG

## 2024-05-23 VITALS — DIASTOLIC BLOOD PRESSURE: 72 MMHG | SYSTOLIC BLOOD PRESSURE: 160 MMHG

## 2024-05-23 PROCEDURE — G2211 COMPLEX E/M VISIT ADD ON: CPT

## 2024-05-23 PROCEDURE — 36415 COLL VENOUS BLD VENIPUNCTURE: CPT

## 2024-05-23 PROCEDURE — 93000 ELECTROCARDIOGRAM COMPLETE: CPT

## 2024-05-23 PROCEDURE — 99214 OFFICE O/P EST MOD 30 MIN: CPT

## 2024-05-23 NOTE — ASSESSMENT
[FreeTextEntry1] : - palpitations she will self monitoring  - eliquis for afib 5 mg bid - retry ozempic 2 mg once a week if still vomiting will switch to zepbound  - will plan eventually to inc metformin to 1000 mg bid  - bp elevated today she is nervous bp has been controlled in the past continue valsartan 160 mg daily  - she is on rosuvastatin 10 mg for primary prevention - fu in six months

## 2024-05-23 NOTE — HISTORY OF PRESENT ILLNESS
[FreeTextEntry1] : 73 F HTN Pre- DM HLD Hand Cellulitis c/b sepsis AFib  here for usual fu she has palpitations thinks she is having afib more frequently vomiting with higher doses of ozempic     ecg nsr rbbb lafb 5/23/24 2023 Echo LVH EF normal

## 2024-05-24 ENCOUNTER — TRANSCRIPTION ENCOUNTER (OUTPATIENT)
Age: 74
End: 2024-05-24

## 2024-05-24 LAB
ALBUMIN SERPL ELPH-MCNC: 4.7 G/DL
ALP BLD-CCNC: 78 U/L
ALT SERPL-CCNC: 14 U/L
ANION GAP SERPL CALC-SCNC: 11 MMOL/L
AST SERPL-CCNC: 18 U/L
BILIRUB SERPL-MCNC: 0.6 MG/DL
BUN SERPL-MCNC: 16 MG/DL
CALCIUM SERPL-MCNC: 10.2 MG/DL
CHLORIDE SERPL-SCNC: 102 MMOL/L
CHOLEST SERPL-MCNC: 163 MG/DL
CO2 SERPL-SCNC: 26 MMOL/L
CREAT SERPL-MCNC: 0.69 MG/DL
CREAT SPEC-SCNC: 30 MG/DL
EGFR: 92 ML/MIN/1.73M2
ESTIMATED AVERAGE GLUCOSE: 117 MG/DL
GLUCOSE SERPL-MCNC: 99 MG/DL
HBA1C MFR BLD HPLC: 5.7 %
HCT VFR BLD CALC: 40.9 %
HDLC SERPL-MCNC: 48 MG/DL
HGB BLD-MCNC: 13.3 G/DL
LDLC SERPL CALC-MCNC: 91 MG/DL
MCHC RBC-ENTMCNC: 29.8 PG
MCHC RBC-ENTMCNC: 32.5 GM/DL
MCV RBC AUTO: 91.7 FL
MICROALBUMIN 24H UR DL<=1MG/L-MCNC: <1.2 MG/DL
MICROALBUMIN/CREAT 24H UR-RTO: NORMAL MG/G
NONHDLC SERPL-MCNC: 115 MG/DL
NT-PROBNP SERPL-MCNC: 146 PG/ML
PLATELET # BLD AUTO: 222 K/UL
POTASSIUM SERPL-SCNC: 4.5 MMOL/L
PROT SERPL-MCNC: 7 G/DL
RBC # BLD: 4.46 M/UL
RBC # FLD: 13 %
SODIUM SERPL-SCNC: 139 MMOL/L
TRIGL SERPL-MCNC: 137 MG/DL
WBC # FLD AUTO: 6.71 K/UL

## 2024-05-27 ENCOUNTER — RX RENEWAL (OUTPATIENT)
Age: 74
End: 2024-05-27

## 2024-05-27 RX ORDER — VALSARTAN 160 MG/1
160 TABLET, COATED ORAL
Qty: 90 | Refills: 3 | Status: ACTIVE | COMMUNITY
Start: 2023-08-08 | End: 1900-01-01

## 2024-05-28 ENCOUNTER — TRANSCRIPTION ENCOUNTER (OUTPATIENT)
Age: 74
End: 2024-05-28

## 2024-05-29 ENCOUNTER — TRANSCRIPTION ENCOUNTER (OUTPATIENT)
Age: 74
End: 2024-05-29

## 2024-05-29 RX ORDER — SEMAGLUTIDE 0.68 MG/ML
2 INJECTION, SOLUTION SUBCUTANEOUS
Qty: 12 | Refills: 3 | Status: ACTIVE | COMMUNITY
Start: 2024-05-29 | End: 1900-01-01

## 2024-05-29 RX ORDER — SEMAGLUTIDE 0.68 MG/ML
2 INJECTION, SOLUTION SUBCUTANEOUS
Qty: 4 | Refills: 3 | Status: ACTIVE | COMMUNITY
Start: 2023-05-18 | End: 1900-01-01

## 2024-06-03 ENCOUNTER — TRANSCRIPTION ENCOUNTER (OUTPATIENT)
Age: 74
End: 2024-06-03

## 2024-06-04 ENCOUNTER — TRANSCRIPTION ENCOUNTER (OUTPATIENT)
Age: 74
End: 2024-06-04

## 2024-06-24 ENCOUNTER — LABORATORY RESULT (OUTPATIENT)
Age: 74
End: 2024-06-24

## 2024-06-24 ENCOUNTER — APPOINTMENT (OUTPATIENT)
Dept: NEPHROLOGY | Facility: CLINIC | Age: 74
End: 2024-06-24
Payer: MEDICARE

## 2024-06-24 VITALS — HEART RATE: 72 BPM | DIASTOLIC BLOOD PRESSURE: 80 MMHG | SYSTOLIC BLOOD PRESSURE: 130 MMHG

## 2024-06-24 VITALS — SYSTOLIC BLOOD PRESSURE: 120 MMHG | HEART RATE: 72 BPM | DIASTOLIC BLOOD PRESSURE: 86 MMHG

## 2024-06-24 VITALS — SYSTOLIC BLOOD PRESSURE: 130 MMHG | HEART RATE: 72 BPM | DIASTOLIC BLOOD PRESSURE: 80 MMHG

## 2024-06-24 VITALS — BODY MASS INDEX: 32.92 KG/M2 | WEIGHT: 180 LBS

## 2024-06-24 DIAGNOSIS — R03.0 ELEVATED BLOOD-PRESSURE READING, W/OUT DIAGNOSIS OF HYPERTENSION: ICD-10-CM

## 2024-06-24 DIAGNOSIS — E78.00 PURE HYPERCHOLESTEROLEMIA, UNSPECIFIED: ICD-10-CM

## 2024-06-24 DIAGNOSIS — E66.9 OBESITY, UNSPECIFIED: ICD-10-CM

## 2024-06-24 DIAGNOSIS — I10 ESSENTIAL (PRIMARY) HYPERTENSION: ICD-10-CM

## 2024-06-24 DIAGNOSIS — I48.91 UNSPECIFIED ATRIAL FIBRILLATION: ICD-10-CM

## 2024-06-24 DIAGNOSIS — R73.03 PREDIABETES.: ICD-10-CM

## 2024-06-24 PROCEDURE — 99214 OFFICE O/P EST MOD 30 MIN: CPT

## 2024-06-24 PROCEDURE — 36415 COLL VENOUS BLD VENIPUNCTURE: CPT

## 2024-06-24 PROCEDURE — G2211 COMPLEX E/M VISIT ADD ON: CPT

## 2024-06-25 LAB
25(OH)D3 SERPL-MCNC: 45.3 NG/ML
ALBUMIN SERPL ELPH-MCNC: 4.6 G/DL
ALP BLD-CCNC: 74 U/L
ALT SERPL-CCNC: 12 U/L
ANION GAP SERPL CALC-SCNC: 12 MMOL/L
APPEARANCE: CLEAR
AST SERPL-CCNC: 18 U/L
BACTERIA: NEGATIVE /HPF
BILIRUB SERPL-MCNC: 0.4 MG/DL
BILIRUBIN URINE: NEGATIVE
BLOOD URINE: NEGATIVE
BUN SERPL-MCNC: 13 MG/DL
C3 SERPL-MCNC: 129 MG/DL
C4 SERPL-MCNC: 40 MG/DL
CALCIUM SERPL-MCNC: 9.8 MG/DL
CALCIUM SERPL-MCNC: 9.8 MG/DL
CAST: 1 /LPF
CHLORIDE ?TM UR-SCNC: 46 MMOL/L
CHLORIDE SERPL-SCNC: 103 MMOL/L
CHOLEST SERPL-MCNC: 154 MG/DL
CO2 SERPL-SCNC: 24 MMOL/L
COLOR: YELLOW
CREAT SERPL-MCNC: 0.68 MG/DL
CREAT SPEC-SCNC: 19 MG/DL
CREAT/PROT UR: 0.2 RATIO
CYSTATIN C SERPL-MCNC: 0.99 MG/L
EGFR: 92 ML/MIN/1.73M2
EPITHELIAL CELLS: 4 /HPF
FERRITIN SERPL-MCNC: 73 NG/ML
FOLATE SERPL-MCNC: 13.6 NG/ML
GFR/BSA.PRED SERPLBLD CYS-BASED-ARV: 70 ML/MIN/1.73M2
GLUCOSE QUALITATIVE U: NEGATIVE MG/DL
GLUCOSE SERPL-MCNC: 88 MG/DL
HBA1C MFR BLD HPLC: 5.6 %
HBV SURFACE AB SER QL: NONREACTIVE
HBV SURFACE AG SER QL: NONREACTIVE
HCT VFR BLD CALC: 41 %
HCV AB SER QL: NONREACTIVE
HCV S/CO RATIO: 0.08 S/CO
HCYS SERPL-MCNC: 10.5 UMOL/L
HDLC SERPL-MCNC: 49 MG/DL
HGB BLD-MCNC: 13.3 G/DL
IRON SATN MFR SERPL: 17 %
IRON SERPL-MCNC: 58 UG/DL
KETONES URINE: NEGATIVE MG/DL
LDLC SERPL CALC-MCNC: 75 MG/DL
LEUKOCYTE ESTERASE URINE: ABNORMAL
MAGNESIUM SERPL-MCNC: 2.1 MG/DL
MCHC RBC-ENTMCNC: 29.4 PG
MCHC RBC-ENTMCNC: 32.4 GM/DL
MCV RBC AUTO: 90.7 FL
MICROSCOPIC-UA: NORMAL
NITRITE URINE: NEGATIVE
NONHDLC SERPL-MCNC: 105 MG/DL
OSMOLALITY UR: 199 MOSM/KG
PARATHYROID HORMONE INTACT: 32 PG/ML
PH URINE: 7
PHOSPHATE SERPL-MCNC: 4.3 MG/DL
PLATELET # BLD AUTO: 232 K/UL
POTASSIUM SERPL-SCNC: 4.1 MMOL/L
POTASSIUM UR-SCNC: 19.4 MMOL/L
PROT SERPL-MCNC: 6.8 G/DL
PROT UR-MCNC: 4 MG/DL
PROTEIN URINE: NEGATIVE MG/DL
RBC # BLD: 4.52 M/UL
RBC # FLD: 12.8 %
RED BLOOD CELLS URINE: 1 /HPF
RENIN PLASMA: 8 PG/ML
RHEUMATOID FACT SER QL: <10 IU/ML
SODIUM ?TM SUB UR QN: 42 MMOL/L
SODIUM SERPL-SCNC: 140 MMOL/L
SPECIFIC GRAVITY URINE: 1.01
T3FREE SERPL-MCNC: 3.18 PG/ML
T3RU NFR SERPL: 1.1 TBI
T4 FREE SERPL-MCNC: 1.3 NG/DL
T4 SERPL-MCNC: 8.5 UG/DL
TIBC SERPL-MCNC: 344 UG/DL
TRIGL SERPL-MCNC: 181 MG/DL
TSH SERPL-ACNC: 1.72 UIU/ML
UIBC SERPL-MCNC: 286 UG/DL
URATE SERPL-MCNC: 4.1 MG/DL
UROBILINOGEN URINE: 0.2 MG/DL
VIT B12 SERPL-MCNC: 382 PG/ML
WBC # FLD AUTO: 8.53 K/UL
WHITE BLOOD CELLS URINE: 12 /HPF

## 2024-06-26 LAB
ALBUMIN MFR SERPL ELPH: 62.4 %
ALBUMIN SERPL-MCNC: 4.2 G/DL
ALBUMIN/GLOB SERPL: 1.6 RATIO
ALBUPE: 28.4 %
ALPHA1 GLOB MFR SERPL ELPH: 3.8 %
ALPHA1 GLOB SERPL ELPH-MCNC: 0.3 G/DL
ALPHA1UPE: 27.1 %
ALPHA2 GLOB MFR SERPL ELPH: 9.8 %
ALPHA2 GLOB SERPL ELPH-MCNC: 0.7 G/DL
ALPHA2UPE: 18.3 %
ANA SER IF-ACNC: NEGATIVE
B-GLOBULIN MFR SERPL ELPH: 12.2 %
B-GLOBULIN SERPL ELPH-MCNC: 0.8 G/DL
BETAUPE: 14.5 %
CREAT 24H UR-MCNC: NORMAL G/24 H
CREAT ?TM UR-MCNC: 19 MG/DL
CREAT ?TM UR-MCNC: 19 MG/DL
CREATININE UR (MAYO): 19 MG/DL
DEPRECATED KAPPA LC FREE/LAMBDA SER: 1.21 RATIO
FREE KAPPA URINE: 0.82 MG/L
FREE KAPPA/LAMDA RATIO: NORMAL
FREE LAMDA URINE: <0.74 MG/L
GAMMA GLOB FLD ELPH-MCNC: 0.8 G/DL
GAMMA GLOB MFR SERPL ELPH: 11.8 %
GAMMAUPE: 11.7 %
IGA 24H UR QL IFE: NORMAL
IGA SER QL IEP: 179 MG/DL
IGG SER QL IEP: 706 MG/DL
IGM SER QL IEP: 110 MG/DL
INTERPRETATION SERPL IEP-IMP: NORMAL
KAPPA LC 24H UR QL: NORMAL
KAPPA LC CSF-MCNC: 2.18 MG/DL
KAPPA LC SERPL-MCNC: 2.64 MG/DL
M PROTEIN SPEC IFE-MCNC: NORMAL
MICROALBUMIN 24H UR DL<=1MG/L-MCNC: <1.2 MG/DL
MICROALBUMIN 24H UR DL<=1MG/L-MRATE: NORMAL MG/24HR
MICROALBUMIN ?TM UR-MRATE: NORMAL UG/MIN
PROT 24H UR-MRATE: 5 MG/DL
PROT ?TM UR-MCNC: NORMAL
PROT ?TM UR-MCNC: NORMAL
PROT PATTERN 24H UR ELPH-IMP: NORMAL
PROT SERPL-MCNC: 6.8 G/DL
PROT SERPL-MCNC: 6.8 G/DL
PROT UR-MCNC: 5 MG/DL
PROT UR-MCNC: 5 MG/DL
PROT UR-MCNC: NORMAL
SPECIMEN VOL 24H UR: NORMAL
SPECIMEN VOL 24H UR: NORMAL
SPECIMEN VOL 24H UR: NORMAL ML
THYROGLOB AB SERPL-ACNC: <20 IU/ML
THYROPEROXIDASE AB SERPL IA-ACNC: <10 IU/ML

## 2024-06-27 LAB
24R-OH-CALCIDIOL SERPL-MCNC: 52.8 PG/ML
ALDOSTERONE SERUM: 6.8 NG/DL
ALP BONE SERPL-MCNC: 12.9 UG/L

## 2024-06-30 LAB — METHYLMALONATE SERPL-SCNC: 236 NMOL/L

## 2024-07-01 LAB — COLLAGEN CTX SERPL-MCNC: 141 PG/ML

## 2024-07-22 ENCOUNTER — RX RENEWAL (OUTPATIENT)
Age: 74
End: 2024-07-22

## 2024-07-26 RX ORDER — ROSUVASTATIN CALCIUM 10 MG/1
10 TABLET, FILM COATED ORAL
Qty: 90 | Refills: 3 | Status: ACTIVE | COMMUNITY
Start: 1900-01-01 | End: 1900-01-01

## 2024-08-12 ENCOUNTER — RX RENEWAL (OUTPATIENT)
Age: 74
End: 2024-08-12

## 2024-09-16 ENCOUNTER — LABORATORY RESULT (OUTPATIENT)
Age: 74
End: 2024-09-16

## 2024-09-16 ENCOUNTER — APPOINTMENT (OUTPATIENT)
Dept: NEPHROLOGY | Facility: CLINIC | Age: 74
End: 2024-09-16
Payer: MEDICARE

## 2024-09-16 VITALS — DIASTOLIC BLOOD PRESSURE: 74 MMHG | HEART RATE: 60 BPM | SYSTOLIC BLOOD PRESSURE: 138 MMHG

## 2024-09-16 VITALS — SYSTOLIC BLOOD PRESSURE: 140 MMHG | DIASTOLIC BLOOD PRESSURE: 82 MMHG | HEART RATE: 72 BPM

## 2024-09-16 VITALS — HEART RATE: 60 BPM | SYSTOLIC BLOOD PRESSURE: 130 MMHG | DIASTOLIC BLOOD PRESSURE: 80 MMHG

## 2024-09-16 VITALS — BODY MASS INDEX: 35.3 KG/M2 | WEIGHT: 193 LBS

## 2024-09-16 DIAGNOSIS — E78.5 HYPERLIPIDEMIA, UNSPECIFIED: ICD-10-CM

## 2024-09-16 DIAGNOSIS — R03.0 ELEVATED BLOOD-PRESSURE READING, W/OUT DIAGNOSIS OF HYPERTENSION: ICD-10-CM

## 2024-09-16 DIAGNOSIS — I48.91 UNSPECIFIED ATRIAL FIBRILLATION: ICD-10-CM

## 2024-09-16 DIAGNOSIS — R39.9 UNSPECIFIED SYMPTOMS AND SIGNS INVOLVING THE GENITOURINARY SYSTEM: ICD-10-CM

## 2024-09-16 DIAGNOSIS — F43.9 REACTION TO SEVERE STRESS, UNSPECIFIED: ICD-10-CM

## 2024-09-16 DIAGNOSIS — E78.00 PURE HYPERCHOLESTEROLEMIA, UNSPECIFIED: ICD-10-CM

## 2024-09-16 DIAGNOSIS — R73.03 PREDIABETES.: ICD-10-CM

## 2024-09-16 DIAGNOSIS — I10 ESSENTIAL (PRIMARY) HYPERTENSION: ICD-10-CM

## 2024-09-16 DIAGNOSIS — Z23 ENCOUNTER FOR IMMUNIZATION: ICD-10-CM

## 2024-09-16 DIAGNOSIS — E66.9 OBESITY, UNSPECIFIED: ICD-10-CM

## 2024-09-16 PROCEDURE — G2211 COMPLEX E/M VISIT ADD ON: CPT

## 2024-09-16 PROCEDURE — G0008: CPT

## 2024-09-16 PROCEDURE — 36415 COLL VENOUS BLD VENIPUNCTURE: CPT

## 2024-09-16 PROCEDURE — 99214 OFFICE O/P EST MOD 30 MIN: CPT

## 2024-09-16 PROCEDURE — 90662 IIV NO PRSV INCREASED AG IM: CPT

## 2024-09-16 NOTE — END OF VISIT
[TextEntry] : All medical record entries have been made by the scribe, LAINE DOTSON, at Dr. Jose Ramon Ramires direction and personally dictated by me on 9/19/24. I have received the chart and agree that the record accurately reflects my personal performance of the history, physical exam, assessment, and plan. I have also personally directed, reviewed and agreed with the chart.

## 2024-09-16 NOTE — PLAN
[TextEntry] : - Referred to Dr. Norris for urinary frequency and urgency.  BP/HR taken in different positions (sitting standing and lying down) and d/w pt Self-monitoring at home advised i.e. BP, FS, etc. Medications updated Diet/healthy lifestyle counselling New labs ordered. Follow up in 2-3 months.

## 2024-09-16 NOTE — PHYSICAL EXAM
[General Appearance - Alert] : alert [General Appearance - In No Acute Distress] : in no acute distress [Auscultation Breath Sounds / Voice Sounds] : lungs were clear to auscultation bilaterally [Heart Sounds] : normal S1 and S2 [Heart Rate And Rhythm] : heart rate was normal and rhythm regular [Murmurs] : no murmurs [Heart Sounds Gallop] : no gallops [Heart Sounds Pericardial Friction Rub] : no pericardial rub [Full Pulse] : the pedal pulses are present [Edema] : there was no peripheral edema [Abdomen Soft] : soft [Bowel Sounds] : normal bowel sounds [Abdomen Tenderness] : non-tender [] : no hepato-splenomegaly [Abdomen Mass (___ Cm)] : no abdominal mass palpated [Abnormal Walk] : normal gait [Nail Clubbing] : no clubbing  or cyanosis of the fingernails [Musculoskeletal - Swelling] : no joint swelling seen [Motor Tone] : muscle strength and tone were normal [Oriented To Time, Place, And Person] : oriented to person, place, and time [Impaired Insight] : insight and judgment were intact [Affect] : the affect was normal [FreeTextEntry1] : + WEARS EYEGLASSES

## 2024-09-16 NOTE — HISTORY OF PRESENT ILLNESS
[FreeTextEntry1] : 74 y/o F with PMHX of AFIB, DM, hypercholesterolemia, HLD, HTN, low back pain, obesity, post-traumatic stress disorder, OA right knee, stress syndrome, thyromegaly, Vit D deficiency being seen for follow up visit. Last seen 06/24/24.  Patient feels fine generally. ROS is negative otherwise.  Patient would like to get the shingles vax - admits hx of chicken pox and shingles.  Patient would also like to receive the flu vax today in office.  Patient admits exercising 5x/week. Lost weight. She went from 191 to 169lb. Was also on ozempic 2mg/weekly.  She increased her dosage and got really sick & stopped her injections. Would like to restart. Reports her BP was also stabilized at 120s   She reports jumping rapidly from 0.25mg to 2mg - contributing to her sickness.   She completed her colonoscopy in July and upper endoscopy in 2018.  She reports urinary urgency and frequency.

## 2024-09-17 LAB
25(OH)D3 SERPL-MCNC: 45.3 NG/ML
ALBUMIN SERPL ELPH-MCNC: 4.6 G/DL
ALP BLD-CCNC: 83 U/L
ALT SERPL-CCNC: 15 U/L
ANION GAP SERPL CALC-SCNC: 14 MMOL/L
APPEARANCE: CLEAR
AST SERPL-CCNC: 19 U/L
BACTERIA: ABNORMAL /HPF
BILIRUB SERPL-MCNC: 0.4 MG/DL
BILIRUBIN URINE: NEGATIVE
BLOOD URINE: NEGATIVE
BUN SERPL-MCNC: 18 MG/DL
C3 SERPL-MCNC: 138 MG/DL
C4 SERPL-MCNC: 38 MG/DL
CALCIUM SERPL-MCNC: 10.1 MG/DL
CALCIUM SERPL-MCNC: 10.1 MG/DL
CAST: 0 /LPF
CHLORIDE ?TM UR-SCNC: 79 MMOL/L
CHLORIDE SERPL-SCNC: 103 MMOL/L
CHOLEST SERPL-MCNC: 191 MG/DL
CO2 SERPL-SCNC: 24 MMOL/L
COLOR: YELLOW
CREAT SERPL-MCNC: 0.71 MG/DL
CREAT SPEC-SCNC: 38 MG/DL
CREAT/PROT UR: 0.3 RATIO
CYSTATIN C SERPL-MCNC: 1.05 MG/L
EGFR: 90 ML/MIN/1.73M2
EPITHELIAL CELLS: 2 /HPF
ESTIMATED AVERAGE GLUCOSE: 120 MG/DL
FERRITIN SERPL-MCNC: 70 NG/ML
FOLATE SERPL-MCNC: 15.1 NG/ML
GFR/BSA.PRED SERPLBLD CYS-BASED-ARV: 65 ML/MIN/1.73M2
GLUCOSE QUALITATIVE U: NEGATIVE MG/DL
GLUCOSE SERPL-MCNC: 99 MG/DL
HBA1C MFR BLD HPLC: 5.8 %
HBV SURFACE AB SER QL: NONREACTIVE
HBV SURFACE AG SER QL: NONREACTIVE
HCT VFR BLD CALC: 41.8 %
HCV AB SER QL: NONREACTIVE
HCV S/CO RATIO: 0.11 S/CO
HCYS SERPL-MCNC: 10.5 UMOL/L
HDLC SERPL-MCNC: 56 MG/DL
HGB BLD-MCNC: 13.7 G/DL
IRON SATN MFR SERPL: 24 %
IRON SERPL-MCNC: 84 UG/DL
KETONES URINE: NEGATIVE MG/DL
LDLC SERPL CALC-MCNC: 104 MG/DL
LEUKOCYTE ESTERASE URINE: ABNORMAL
MAGNESIUM SERPL-MCNC: 2.3 MG/DL
MCHC RBC-ENTMCNC: 30.3 PG
MCHC RBC-ENTMCNC: 32.8 GM/DL
MCV RBC AUTO: 92.5 FL
MICROSCOPIC-UA: NORMAL
NITRITE URINE: POSITIVE
NONHDLC SERPL-MCNC: 135 MG/DL
OSMOLALITY UR: 401 MOSM/KG
PARATHYROID HORMONE INTACT: 40 PG/ML
PH URINE: 6.5
PHOSPHATE SERPL-MCNC: 3.9 MG/DL
PLATELET # BLD AUTO: 241 K/UL
POTASSIUM SERPL-SCNC: 4.9 MMOL/L
POTASSIUM UR-SCNC: 30.2 MMOL/L
PROT SERPL-MCNC: 7.1 G/DL
PROT UR-MCNC: 13 MG/DL
PROTEIN URINE: NEGATIVE MG/DL
RBC # BLD: 4.52 M/UL
RBC # FLD: 13.2 %
RED BLOOD CELLS URINE: 2 /HPF
RHEUMATOID FACT SER QL: 10 IU/ML
SODIUM ?TM SUB UR QN: 79 MMOL/L
SODIUM SERPL-SCNC: 141 MMOL/L
SPECIFIC GRAVITY URINE: 1.01
T3FREE SERPL-MCNC: 3.13 PG/ML
T3RU NFR SERPL: 1.1 TBI
T4 FREE SERPL-MCNC: 1.2 NG/DL
T4 SERPL-MCNC: 9.4 UG/DL
THYROGLOB AB SERPL-ACNC: 16 IU/ML
THYROPEROXIDASE AB SERPL IA-ACNC: 9.3 IU/ML
TIBC SERPL-MCNC: 354 UG/DL
TRIGL SERPL-MCNC: 181 MG/DL
TSH SERPL-ACNC: 1.51 UIU/ML
UIBC SERPL-MCNC: 271 UG/DL
URATE SERPL-MCNC: 4.5 MG/DL
UROBILINOGEN URINE: 0.2 MG/DL
VIT B12 SERPL-MCNC: 391 PG/ML
WBC # FLD AUTO: 8.54 K/UL
WHITE BLOOD CELLS URINE: 13 /HPF

## 2024-09-18 LAB
24R-OH-CALCIDIOL SERPL-MCNC: 75.2 PG/ML
ALDOSTERONE SERUM: 9.1 NG/DL
ANA SER IF-ACNC: NEGATIVE

## 2024-09-19 LAB — ALP BONE SERPL-MCNC: 14.1 UG/L

## 2024-09-20 LAB — METHYLMALONATE SERPL-SCNC: 174 NMOL/L

## 2024-09-23 LAB
ALBUMIN MFR SERPL ELPH: 63.8 %
ALBUMIN SERPL-MCNC: 4.5 G/DL
ALBUMIN/GLOB SERPL: 1.7 RATIO
ALPHA1 GLOB MFR SERPL ELPH: 3.8 %
ALPHA1 GLOB SERPL ELPH-MCNC: 0.3 G/DL
ALPHA2 GLOB MFR SERPL ELPH: 9.2 %
ALPHA2 GLOB SERPL ELPH-MCNC: 0.7 G/DL
B-GLOBULIN MFR SERPL ELPH: 11.7 %
B-GLOBULIN SERPL ELPH-MCNC: 0.8 G/DL
DEPRECATED KAPPA LC FREE/LAMBDA SER: 1.28 RATIO
GAMMA GLOB FLD ELPH-MCNC: 0.8 G/DL
GAMMA GLOB MFR SERPL ELPH: 11.5 %
IGA SER QL IEP: 183 MG/DL
IGG SER QL IEP: 753 MG/DL
IGM SER QL IEP: 120 MG/DL
INTERPRETATION SERPL IEP-IMP: NORMAL
KAPPA LC CSF-MCNC: 2.29 MG/DL
KAPPA LC SERPL-MCNC: 2.94 MG/DL
M PROTEIN SPEC IFE-MCNC: NORMAL
PROT SERPL-MCNC: 7.1 G/DL
PROT SERPL-MCNC: 7.1 G/DL

## 2024-09-24 LAB — COLLAGEN CTX SERPL-MCNC: 200 PG/ML

## 2024-10-15 ENCOUNTER — TRANSCRIPTION ENCOUNTER (OUTPATIENT)
Age: 74
End: 2024-10-15

## 2024-10-28 ENCOUNTER — TRANSCRIPTION ENCOUNTER (OUTPATIENT)
Age: 74
End: 2024-10-28

## 2024-12-10 ENCOUNTER — APPOINTMENT (OUTPATIENT)
Dept: UROLOGY | Facility: CLINIC | Age: 74
End: 2024-12-10

## 2025-01-08 ENCOUNTER — APPOINTMENT (OUTPATIENT)
Dept: ORTHOPEDIC SURGERY | Facility: CLINIC | Age: 75
End: 2025-01-08
Payer: MEDICARE

## 2025-01-08 ENCOUNTER — OUTPATIENT (OUTPATIENT)
Dept: OUTPATIENT SERVICES | Facility: HOSPITAL | Age: 75
LOS: 1 days | End: 2025-01-08
Payer: MEDICARE

## 2025-01-08 ENCOUNTER — RESULT REVIEW (OUTPATIENT)
Age: 75
End: 2025-01-08

## 2025-01-08 VITALS
WEIGHT: 183 LBS | HEART RATE: 64 BPM | DIASTOLIC BLOOD PRESSURE: 80 MMHG | OXYGEN SATURATION: 98 % | SYSTOLIC BLOOD PRESSURE: 154 MMHG | BODY MASS INDEX: 33.68 KG/M2 | HEIGHT: 62 IN

## 2025-01-08 DIAGNOSIS — Z96.652 AFTERCARE FOLLOWING JOINT REPLACEMENT SURGERY: ICD-10-CM

## 2025-01-08 DIAGNOSIS — Z47.1 AFTERCARE FOLLOWING JOINT REPLACEMENT SURGERY: ICD-10-CM

## 2025-01-08 DIAGNOSIS — M17.11 UNILATERAL PRIMARY OSTEOARTHRITIS, RIGHT KNEE: ICD-10-CM

## 2025-01-08 DIAGNOSIS — Z98.890 OTHER SPECIFIED POSTPROCEDURAL STATES: Chronic | ICD-10-CM

## 2025-01-08 PROCEDURE — 73564 X-RAY EXAM KNEE 4 OR MORE: CPT | Mod: 26,50

## 2025-01-08 PROCEDURE — 73564 X-RAY EXAM KNEE 4 OR MORE: CPT

## 2025-01-08 PROCEDURE — 99214 OFFICE O/P EST MOD 30 MIN: CPT

## 2025-01-16 ENCOUNTER — APPOINTMENT (OUTPATIENT)
Dept: NEPHROLOGY | Facility: CLINIC | Age: 75
End: 2025-01-16
Payer: MEDICARE

## 2025-01-16 ENCOUNTER — LABORATORY RESULT (OUTPATIENT)
Age: 75
End: 2025-01-16

## 2025-01-16 ENCOUNTER — APPOINTMENT (OUTPATIENT)
Dept: HEART AND VASCULAR | Facility: CLINIC | Age: 75
End: 2025-01-16
Payer: MEDICARE

## 2025-01-16 ENCOUNTER — NON-APPOINTMENT (OUTPATIENT)
Age: 75
End: 2025-01-16

## 2025-01-16 VITALS — SYSTOLIC BLOOD PRESSURE: 129 MMHG | DIASTOLIC BLOOD PRESSURE: 82 MMHG | HEART RATE: 78 BPM

## 2025-01-16 VITALS
HEART RATE: 83 BPM | DIASTOLIC BLOOD PRESSURE: 72 MMHG | TEMPERATURE: 98.6 F | OXYGEN SATURATION: 98 % | SYSTOLIC BLOOD PRESSURE: 122 MMHG | BODY MASS INDEX: 33.68 KG/M2 | WEIGHT: 183 LBS | HEIGHT: 62 IN

## 2025-01-16 VITALS — WEIGHT: 183 LBS | BODY MASS INDEX: 33.47 KG/M2

## 2025-01-16 VITALS — HEART RATE: 82 BPM | SYSTOLIC BLOOD PRESSURE: 131 MMHG | DIASTOLIC BLOOD PRESSURE: 78 MMHG

## 2025-01-16 VITALS — SYSTOLIC BLOOD PRESSURE: 118 MMHG | DIASTOLIC BLOOD PRESSURE: 78 MMHG | HEART RATE: 89 BPM

## 2025-01-16 DIAGNOSIS — E78.00 PURE HYPERCHOLESTEROLEMIA, UNSPECIFIED: ICD-10-CM

## 2025-01-16 DIAGNOSIS — E78.5 HYPERLIPIDEMIA, UNSPECIFIED: ICD-10-CM

## 2025-01-16 DIAGNOSIS — I48.91 UNSPECIFIED ATRIAL FIBRILLATION: ICD-10-CM

## 2025-01-16 DIAGNOSIS — I10 ESSENTIAL (PRIMARY) HYPERTENSION: ICD-10-CM

## 2025-01-16 DIAGNOSIS — E66.01 MORBID (SEVERE) OBESITY DUE TO EXCESS CALORIES: ICD-10-CM

## 2025-01-16 DIAGNOSIS — R03.0 ELEVATED BLOOD-PRESSURE READING, W/OUT DIAGNOSIS OF HYPERTENSION: ICD-10-CM

## 2025-01-16 DIAGNOSIS — R73.03 PREDIABETES.: ICD-10-CM

## 2025-01-16 DIAGNOSIS — E66.9 OBESITY, UNSPECIFIED: ICD-10-CM

## 2025-01-16 PROCEDURE — 99214 OFFICE O/P EST MOD 30 MIN: CPT

## 2025-01-16 PROCEDURE — G2211 COMPLEX E/M VISIT ADD ON: CPT

## 2025-01-16 PROCEDURE — 93000 ELECTROCARDIOGRAM COMPLETE: CPT

## 2025-01-16 PROCEDURE — 36415 COLL VENOUS BLD VENIPUNCTURE: CPT

## 2025-01-17 ENCOUNTER — TRANSCRIPTION ENCOUNTER (OUTPATIENT)
Age: 75
End: 2025-01-17

## 2025-01-17 LAB
24R-OH-CALCIDIOL SERPL-MCNC: 51 PG/ML
25(OH)D3 SERPL-MCNC: 54 NG/ML
ALBUMIN SERPL ELPH-MCNC: 4.7 G/DL
ALDOSTERONE SERUM: 8.2 NG/DL
ALP BLD-CCNC: 93 U/L
ALT SERPL-CCNC: 12 U/L
ANION GAP SERPL CALC-SCNC: 15 MMOL/L
APPEARANCE: CLEAR
AST SERPL-CCNC: 17 U/L
BACTERIA: NEGATIVE /HPF
BILIRUB SERPL-MCNC: 0.6 MG/DL
BILIRUBIN URINE: NEGATIVE
BLOOD URINE: NEGATIVE
BUN SERPL-MCNC: 12 MG/DL
C3 SERPL-MCNC: 152 MG/DL
C4 SERPL-MCNC: 47 MG/DL
CALCIUM SERPL-MCNC: 10.5 MG/DL
CALCIUM SERPL-MCNC: 10.5 MG/DL
CAST: 0 /LPF
CHLORIDE ?TM UR-SCNC: 123 MMOL/L
CHLORIDE SERPL-SCNC: 100 MMOL/L
CHOLEST SERPL-MCNC: 163 MG/DL
CO2 SERPL-SCNC: 27 MMOL/L
COLOR: YELLOW
CREAT SERPL-MCNC: 0.75 MG/DL
CREAT SPEC-SCNC: 39 MG/DL
CREAT/PROT UR: 0.1 RATIO
CRP SERPL-MCNC: <3 MG/L
CYSTATIN C SERPL-MCNC: 0.99 MG/L
EGFR: 83 ML/MIN/1.73M2
EPITHELIAL CELLS: 8 /HPF
ERYTHROCYTE [SEDIMENTATION RATE] IN BLOOD BY WESTERGREN METHOD: 27 MM/HR
ESTIMATED AVERAGE GLUCOSE: 114 MG/DL
FERRITIN SERPL-MCNC: 103 NG/ML
FOLATE SERPL-MCNC: 18.2 NG/ML
GFR/BSA.PRED SERPLBLD CYS-BASED-ARV: 69 ML/MIN/1.73M2
GLUCOSE QUALITATIVE U: NEGATIVE MG/DL
GLUCOSE SERPL-MCNC: 120 MG/DL
HBA1C MFR BLD HPLC: 5.6 %
HCT VFR BLD CALC: 45.4 %
HCYS SERPL-MCNC: 11.2 UMOL/L
HDLC SERPL-MCNC: 53 MG/DL
HGB BLD-MCNC: 15 G/DL
IRON SATN MFR SERPL: 17 %
IRON SERPL-MCNC: 64 UG/DL
KETONES URINE: NEGATIVE MG/DL
LDLC SERPL CALC-MCNC: 83 MG/DL
LEUKOCYTE ESTERASE URINE: ABNORMAL
MAGNESIUM SERPL-MCNC: 2.1 MG/DL
MCHC RBC-ENTMCNC: 29.9 PG
MCHC RBC-ENTMCNC: 33 G/DL
MCV RBC AUTO: 90.4 FL
MICROSCOPIC-UA: NORMAL
NITRITE URINE: NEGATIVE
NONHDLC SERPL-MCNC: 110 MG/DL
OSMOLALITY UR: 394 MOSM/KG
PARATHYROID HORMONE INTACT: 28 PG/ML
PH URINE: 7
PHOSPHATE SERPL-MCNC: 3.9 MG/DL
PLATELET # BLD AUTO: 252 K/UL
POTASSIUM SERPL-SCNC: 4.1 MMOL/L
POTASSIUM UR-SCNC: 47.9 MMOL/L
PROT SERPL-MCNC: 7.4 G/DL
PROT UR-MCNC: 6 MG/DL
PROTEIN URINE: NEGATIVE MG/DL
RBC # BLD: 5.02 M/UL
RBC # FLD: 13.2 %
RED BLOOD CELLS URINE: 2 /HPF
RENIN PLASMA: 4.4 PG/ML
RHEUMATOID FACT SER QL: <10 IU/ML
SODIUM ?TM SUB UR QN: 115 MMOL/L
SODIUM SERPL-SCNC: 142 MMOL/L
SPECIFIC GRAVITY URINE: 1.01
T3FREE SERPL-MCNC: 3.22 PG/ML
T3RU NFR SERPL: 1.1 TBI
T4 FREE SERPL-MCNC: 1.3 NG/DL
T4 SERPL-MCNC: 10.2 UG/DL
THYROGLOB AB SERPL-ACNC: <15 IU/ML
THYROPEROXIDASE AB SERPL IA-ACNC: 11.8 IU/ML
TIBC SERPL-MCNC: 379 UG/DL
TRIGL SERPL-MCNC: 159 MG/DL
TSH SERPL-ACNC: 1.14 UIU/ML
UIBC SERPL-MCNC: 314 UG/DL
URATE SERPL-MCNC: 4 MG/DL
UROBILINOGEN URINE: 0.2 MG/DL
VIT B12 SERPL-MCNC: 381 PG/ML
WBC # FLD AUTO: 9.42 K/UL
WHITE BLOOD CELLS URINE: 11 /HPF

## 2025-01-17 RX ORDER — TIRZEPATIDE 5 MG/.5ML
5 INJECTION, SOLUTION SUBCUTANEOUS
Qty: 1 | Refills: 1 | Status: ACTIVE | COMMUNITY
Start: 2025-01-16 | End: 1900-01-01

## 2025-01-18 LAB
HBV SURFACE AB SER QL: NONREACTIVE
HBV SURFACE AG SER QL: NONREACTIVE
HCV AB SER QL: NONREACTIVE
HCV S/CO RATIO: 0.09 S/CO

## 2025-01-20 LAB — METHYLMALONATE SERPL-SCNC: 168 NMOL/L

## 2025-01-21 LAB
ALBUMIN MFR SERPL ELPH: 62.4 %
ALBUMIN SERPL-MCNC: 4.6 G/DL
ALBUMIN/GLOB SERPL: 1.6 RATIO
ALP BONE SERPL-MCNC: 14.2 UG/L
ALPHA1 GLOB MFR SERPL ELPH: 4.1 %
ALPHA1 GLOB SERPL ELPH-MCNC: 0.3 G/DL
ALPHA2 GLOB MFR SERPL ELPH: 9.7 %
ALPHA2 GLOB SERPL ELPH-MCNC: 0.7 G/DL
ANA SER IF-ACNC: NEGATIVE
B-GLOBULIN MFR SERPL ELPH: 12.5 %
B-GLOBULIN SERPL ELPH-MCNC: 0.9 G/DL
DEPRECATED KAPPA LC FREE/LAMBDA SER: 1.2 RATIO
GAMMA GLOB FLD ELPH-MCNC: 0.8 G/DL
GAMMA GLOB MFR SERPL ELPH: 11.3 %
IGA SER QL IEP: 187 MG/DL
IGG SER QL IEP: 835 MG/DL
IGM SER QL IEP: 127 MG/DL
INTERPRETATION SERPL IEP-IMP: NORMAL
KAPPA LC CSF-MCNC: 2.35 MG/DL
KAPPA LC SERPL-MCNC: 2.83 MG/DL
M PROTEIN SPEC IFE-MCNC: NORMAL
PROT SERPL-MCNC: 7.4 G/DL
PROT SERPL-MCNC: 7.4 G/DL

## 2025-01-23 LAB — COLLAGEN CTX SERPL-MCNC: 126 PG/ML

## 2025-02-28 ENCOUNTER — TRANSCRIPTION ENCOUNTER (OUTPATIENT)
Age: 75
End: 2025-02-28

## 2025-03-03 ENCOUNTER — TRANSCRIPTION ENCOUNTER (OUTPATIENT)
Age: 75
End: 2025-03-03

## 2025-03-05 ENCOUNTER — TRANSCRIPTION ENCOUNTER (OUTPATIENT)
Age: 75
End: 2025-03-05

## 2025-03-06 ENCOUNTER — TRANSCRIPTION ENCOUNTER (OUTPATIENT)
Age: 75
End: 2025-03-06

## 2025-03-13 ENCOUNTER — APPOINTMENT (OUTPATIENT)
Dept: HEART AND VASCULAR | Facility: CLINIC | Age: 75
End: 2025-03-13

## 2025-04-02 ENCOUNTER — LABORATORY RESULT (OUTPATIENT)
Age: 75
End: 2025-04-02

## 2025-04-02 ENCOUNTER — APPOINTMENT (OUTPATIENT)
Dept: NEPHROLOGY | Facility: CLINIC | Age: 75
End: 2025-04-02
Payer: MEDICARE

## 2025-04-02 ENCOUNTER — RX RENEWAL (OUTPATIENT)
Age: 75
End: 2025-04-02

## 2025-04-02 VITALS — HEART RATE: 69 BPM | DIASTOLIC BLOOD PRESSURE: 73 MMHG | SYSTOLIC BLOOD PRESSURE: 121 MMHG

## 2025-04-02 VITALS — BODY MASS INDEX: 33.11 KG/M2 | WEIGHT: 181 LBS

## 2025-04-02 VITALS — HEART RATE: 76 BPM | DIASTOLIC BLOOD PRESSURE: 74 MMHG | SYSTOLIC BLOOD PRESSURE: 114 MMHG

## 2025-04-02 VITALS — DIASTOLIC BLOOD PRESSURE: 70 MMHG | SYSTOLIC BLOOD PRESSURE: 113 MMHG | HEART RATE: 63 BPM

## 2025-04-02 VITALS — DIASTOLIC BLOOD PRESSURE: 75 MMHG | SYSTOLIC BLOOD PRESSURE: 112 MMHG | HEART RATE: 69 BPM

## 2025-04-02 PROCEDURE — 36415 COLL VENOUS BLD VENIPUNCTURE: CPT

## 2025-04-02 PROCEDURE — 99214 OFFICE O/P EST MOD 30 MIN: CPT

## 2025-04-02 PROCEDURE — G2211 COMPLEX E/M VISIT ADD ON: CPT

## 2025-04-03 LAB
24R-OH-CALCIDIOL SERPL-MCNC: 32 PG/ML
25(OH)D3 SERPL-MCNC: 54.8 NG/ML
ALBUMIN SERPL ELPH-MCNC: 4.5 G/DL
ALP BLD-CCNC: 87 U/L
ALT SERPL-CCNC: 13 U/L
ANION GAP SERPL CALC-SCNC: 13 MMOL/L
APPEARANCE: CLEAR
AST SERPL-CCNC: 20 U/L
BACTERIA: ABNORMAL /HPF
BILIRUB SERPL-MCNC: 0.4 MG/DL
BILIRUBIN URINE: NEGATIVE
BLOOD URINE: NEGATIVE
BUN SERPL-MCNC: 17 MG/DL
CALCIUM SERPL-MCNC: 9.9 MG/DL
CALCIUM SERPL-MCNC: 9.9 MG/DL
CAST: 1 /LPF
CHLORIDE SERPL-SCNC: 104 MMOL/L
CHOLEST SERPL-MCNC: 143 MG/DL
CO2 SERPL-SCNC: 23 MMOL/L
COLOR: YELLOW
CREAT SERPL-MCNC: 0.68 MG/DL
CREAT SPEC-SCNC: 60 MG/DL
CREAT/PROT UR: 0.1 RATIO
CYSTATIN C SERPL-MCNC: 0.99 MG/L
EGFRCR SERPLBLD CKD-EPI 2021: 91 ML/MIN/1.73M2
EPITHELIAL CELLS: 10 /HPF
ESTIMATED AVERAGE GLUCOSE: 108 MG/DL
GFR/BSA.PRED SERPLBLD CYS-BASED-ARV: 69 ML/MIN/1.73M2
GLUCOSE QUALITATIVE U: NEGATIVE MG/DL
GLUCOSE SERPL-MCNC: 92 MG/DL
HBA1C MFR BLD HPLC: 5.4 %
HCT VFR BLD CALC: 40.7 %
HDLC SERPL-MCNC: 50 MG/DL
HGB BLD-MCNC: 13.7 G/DL
KETONES URINE: NEGATIVE MG/DL
LDLC SERPL-MCNC: 65 MG/DL
LEUKOCYTE ESTERASE URINE: ABNORMAL
MAGNESIUM SERPL-MCNC: 2 MG/DL
MCHC RBC-ENTMCNC: 30 PG
MCHC RBC-ENTMCNC: 33.7 G/DL
MCV RBC AUTO: 89.3 FL
MICROSCOPIC-UA: NORMAL
NITRITE URINE: NEGATIVE
NONHDLC SERPL-MCNC: 93 MG/DL
PARATHYROID HORMONE INTACT: 38 PG/ML
PH URINE: 6
PHOSPHATE SERPL-MCNC: 3.2 MG/DL
PLATELET # BLD AUTO: 258 K/UL
POTASSIUM SERPL-SCNC: 4.4 MMOL/L
PROT SERPL-MCNC: 7.4 G/DL
PROT UR-MCNC: 6 MG/DL
PROTEIN URINE: NEGATIVE MG/DL
RBC # BLD: 4.56 M/UL
RBC # FLD: 13 %
RED BLOOD CELLS URINE: 1 /HPF
SODIUM SERPL-SCNC: 140 MMOL/L
SPECIFIC GRAVITY URINE: 1.01
T3FREE SERPL-MCNC: 2.84 PG/ML
T3RU NFR SERPL: 1.1 TBI
T4 FREE SERPL-MCNC: 1.4 NG/DL
T4 SERPL-MCNC: 10.2 UG/DL
THYROGLOB AB SERPL-ACNC: <15 IU/ML
THYROPEROXIDASE AB SERPL IA-ACNC: 13.7 IU/ML
TRIGL SERPL-MCNC: 164 MG/DL
TSH SERPL-ACNC: 1.14 UIU/ML
URATE SERPL-MCNC: 4.6 MG/DL
UROBILINOGEN URINE: 0.2 MG/DL
WBC # FLD AUTO: 7.83 K/UL
WHITE BLOOD CELLS URINE: 10 /HPF

## 2025-04-07 LAB — ALP BONE SERPL-MCNC: 12.8 UG/L

## 2025-04-23 ENCOUNTER — APPOINTMENT (OUTPATIENT)
Dept: HEART AND VASCULAR | Facility: CLINIC | Age: 75
End: 2025-04-23
Payer: MEDICARE

## 2025-04-23 ENCOUNTER — NON-APPOINTMENT (OUTPATIENT)
Age: 75
End: 2025-04-23

## 2025-04-23 VITALS
OXYGEN SATURATION: 97 % | TEMPERATURE: 97.8 F | HEIGHT: 62 IN | HEART RATE: 67 BPM | BODY MASS INDEX: 33.31 KG/M2 | WEIGHT: 181 LBS | SYSTOLIC BLOOD PRESSURE: 157 MMHG | DIASTOLIC BLOOD PRESSURE: 80 MMHG

## 2025-04-23 PROCEDURE — 99215 OFFICE O/P EST HI 40 MIN: CPT

## 2025-04-23 PROCEDURE — 93000 ELECTROCARDIOGRAM COMPLETE: CPT

## 2025-04-24 ENCOUNTER — TRANSCRIPTION ENCOUNTER (OUTPATIENT)
Age: 75
End: 2025-04-24

## 2025-04-25 ENCOUNTER — NON-APPOINTMENT (OUTPATIENT)
Age: 75
End: 2025-04-25

## 2025-04-25 ENCOUNTER — APPOINTMENT (OUTPATIENT)
Dept: HEART AND VASCULAR | Facility: CLINIC | Age: 75
End: 2025-04-25
Payer: MEDICARE

## 2025-04-25 VITALS
SYSTOLIC BLOOD PRESSURE: 130 MMHG | HEART RATE: 61 BPM | BODY MASS INDEX: 33.49 KG/M2 | HEIGHT: 62 IN | WEIGHT: 182 LBS | OXYGEN SATURATION: 97 % | TEMPERATURE: 98.4 F | DIASTOLIC BLOOD PRESSURE: 70 MMHG

## 2025-04-25 DIAGNOSIS — I48.91 UNSPECIFIED ATRIAL FIBRILLATION: ICD-10-CM

## 2025-04-25 PROCEDURE — 99214 OFFICE O/P EST MOD 30 MIN: CPT

## 2025-04-25 PROCEDURE — G2211 COMPLEX E/M VISIT ADD ON: CPT

## 2025-04-25 PROCEDURE — 93000 ELECTROCARDIOGRAM COMPLETE: CPT

## 2025-04-28 ENCOUNTER — TRANSCRIPTION ENCOUNTER (OUTPATIENT)
Age: 75
End: 2025-04-28

## 2025-04-29 ENCOUNTER — TRANSCRIPTION ENCOUNTER (OUTPATIENT)
Age: 75
End: 2025-04-29

## 2025-04-29 ENCOUNTER — INPATIENT (INPATIENT)
Facility: HOSPITAL | Age: 75
LOS: 0 days | Discharge: HOME | DRG: 243 | End: 2025-04-30
Attending: INTERNAL MEDICINE | Admitting: INTERNAL MEDICINE
Payer: MEDICARE

## 2025-04-29 VITALS — WEIGHT: 181 LBS | HEIGHT: 61 IN

## 2025-04-29 DIAGNOSIS — Z98.890 OTHER SPECIFIED POSTPROCEDURAL STATES: Chronic | ICD-10-CM

## 2025-04-29 PROCEDURE — 33208 INSRT HEART PM ATRIAL & VENT: CPT

## 2025-04-29 PROCEDURE — 93010 ELECTROCARDIOGRAM REPORT: CPT

## 2025-04-29 PROCEDURE — 99222 1ST HOSP IP/OBS MODERATE 55: CPT | Mod: 25

## 2025-04-29 RX ORDER — ERGOCALCIFEROL 1.25 MG/1
1 CAPSULE ORAL
Refills: 0 | DISCHARGE

## 2025-04-29 RX ORDER — ACETAMINOPHEN 500 MG/5ML
650 LIQUID (ML) ORAL EVERY 6 HOURS
Refills: 0 | Status: DISCONTINUED | OUTPATIENT
Start: 2025-04-29 | End: 2025-04-30

## 2025-04-29 RX ORDER — MELATONIN 5 MG
3 TABLET ORAL AT BEDTIME
Refills: 0 | Status: DISCONTINUED | OUTPATIENT
Start: 2025-04-29 | End: 2025-04-30

## 2025-04-29 RX ORDER — ROSUVASTATIN CALCIUM 20 MG/1
10 TABLET, FILM COATED ORAL AT BEDTIME
Refills: 0 | Status: DISCONTINUED | OUTPATIENT
Start: 2025-04-29 | End: 2025-04-30

## 2025-04-29 RX ORDER — METOPROLOL SUCCINATE 50 MG/1
1 TABLET, EXTENDED RELEASE ORAL
Refills: 0 | DISCHARGE

## 2025-04-29 RX ORDER — METOPROLOL SUCCINATE 50 MG/1
25 TABLET, EXTENDED RELEASE ORAL
Refills: 0 | Status: DISCONTINUED | OUTPATIENT
Start: 2025-04-29 | End: 2025-04-30

## 2025-04-29 RX ORDER — APIXABAN 2.5 MG/1
1 TABLET, FILM COATED ORAL
Refills: 0 | DISCHARGE

## 2025-04-29 RX ADMIN — Medication 650 MILLIGRAM(S): at 19:43

## 2025-04-29 RX ADMIN — Medication 3 MILLIGRAM(S): at 22:42

## 2025-04-29 NOTE — H&P ADULT - ASSESSMENT
74 year old female with HTN, HLD, diabetes and paroxysmal atrial fibrillation Eliquis last dose 4/28/ 25  , who has a known RBBB/LAFB and near syncope and presents for scheduled pacemaker implant.   ?

## 2025-04-29 NOTE — PATIENT PROFILE ADULT - FUNCTIONAL ASSESSMENT - BASIC MOBILITY SECTION LABEL
Patient cancelling appointment for Wednesday. States she cannot come.     She was started on new med at last visit Venlafaxine.  Feels it is all going well and likes the med.  Asks if she needs to RSC or can appt be skipped?    .

## 2025-04-29 NOTE — PATIENT PROFILE ADULT - NSPRESCRUSEDDRG_GEN_A_NUR
[FreeTextEntry1] : follow up [de-identified] : Patient is a 59F with thyroid nodule, allergy rhinitis with bronchospasm (advair daily), SVT s/p ablation, right 5th digit degenerative cysts\par  (7/2020), pre-diabetes, HLD presents today in follow up for these issues\par \par s/p right thyroidectomy with Dr. Stringer, doing well. \par \par \par Dr. Read (eye) seen 8/21 slight cataracts\par  No

## 2025-04-29 NOTE — H&P ADULT - HISTORY OF PRESENT ILLNESS
74 year old female with HTN, HLD, diabetes and paroxysmal atrial fibrillation on Eliquis last dose 4/28/ 25 , who has a known RBBB/LAFB and near syncope and presents for scheduled pacemaker implant.   ?  She was last seen 2 years ago. At that time she had an episode of palpitations and was found to be in atrial fibrillation. This episode lasted approximately 4 hours. She was started on apixaban and metoprolol which she has been tolerating without issues. She states she had syncope during this episode. EPisodes predate being on weight loss meds (last weight loss med 2 weeks ago)  ?She wore an event monitor 10/2023 with heart rates 50- and symptoms correlated with NSR and APCs. No afib.  ?She states since then she has had episodes of near syncope. No further syncope. She states she had an episode where her vision greyed out and she had to pull over the car. Another episode while sitting. She states these come on randomly and suddenly. She has a known RBBB/LAFB. No palpitations, edema, SOB, orthopnea.

## 2025-04-29 NOTE — H&P ADULT - NSICDXPASTMEDICALHX_GEN_ALL_CORE_FT
PAST MEDICAL HISTORY:  2019 novel coronavirus disease (COVID-19) march 2022    Fall 3/22    HLD (hyperlipidemia)     HTN (hypertension)     Lower back pain     OA (osteoarthritis)     Shoulder pain, right s/p fall    Sinusitis

## 2025-04-29 NOTE — H&P ADULT - NSICDXPASTSURGICALHX_GEN_ALL_CORE_FT
PC to pharmacy, confirmed the issue is that Trulicity is on national backorder, many patients are going without.    Can try prescribing Ozempic, but may have similar issues with obtaining medication as many people are trying to switch, pharmacist also states there has been issues with other weekly-administered medications for diabetes.    Script for Ozempic pended, please route back to me so I can monitor for prior authorization request.     PAST SURGICAL HISTORY:  H/O hernia repair Left groin

## 2025-04-29 NOTE — PATIENT PROFILE ADULT - FALL HARM RISK - RISK INTERVENTIONS
Assistance OOB with selected safe patient handling equipment/Assistance with ambulation/Communicate Fall Risk and Risk Factors to all staff, patient, and family/Monitor gait and stability/Reinforce activity limits and safety measures with patient and family/Sit up slowly, dangle for a short time, stand at bedside before walking/Use of alarms - bed, chair and/or voice tab/Visual Cue: Yellow wristband/Bed in lowest position, wheels locked, appropriate side rails in place/Call bell, personal items and telephone in reach/Instruct patient to call for assistance before getting out of bed or chair/Non-slip footwear when patient is out of bed/[place] to call system/Physically safe environment - no spills, clutter or unnecessary equipment/Purposeful Proactive Rounding/Room/bathroom lighting operational, light cord in reach

## 2025-04-30 ENCOUNTER — TRANSCRIPTION ENCOUNTER (OUTPATIENT)
Age: 75
End: 2025-04-30

## 2025-04-30 VITALS
RESPIRATION RATE: 17 BRPM | DIASTOLIC BLOOD PRESSURE: 64 MMHG | OXYGEN SATURATION: 96 % | TEMPERATURE: 98 F | HEART RATE: 51 BPM | SYSTOLIC BLOOD PRESSURE: 136 MMHG

## 2025-04-30 PROCEDURE — 84132 ASSAY OF SERUM POTASSIUM: CPT

## 2025-04-30 PROCEDURE — 93005 ELECTROCARDIOGRAM TRACING: CPT

## 2025-04-30 PROCEDURE — C1785: CPT

## 2025-04-30 PROCEDURE — C1892: CPT

## 2025-04-30 PROCEDURE — 82803 BLOOD GASES ANY COMBINATION: CPT

## 2025-04-30 PROCEDURE — C1889: CPT

## 2025-04-30 PROCEDURE — 82565 ASSAY OF CREATININE: CPT

## 2025-04-30 PROCEDURE — 93280 PM DEVICE PROGR EVAL DUAL: CPT | Mod: 26

## 2025-04-30 PROCEDURE — 99238 HOSP IP/OBS DSCHRG MGMT 30/<: CPT

## 2025-04-30 PROCEDURE — 85014 HEMATOCRIT: CPT

## 2025-04-30 PROCEDURE — 85610 PROTHROMBIN TIME: CPT

## 2025-04-30 PROCEDURE — 82330 ASSAY OF CALCIUM: CPT

## 2025-04-30 PROCEDURE — 84295 ASSAY OF SERUM SODIUM: CPT

## 2025-04-30 PROCEDURE — C1769: CPT

## 2025-04-30 PROCEDURE — 82947 ASSAY GLUCOSE BLOOD QUANT: CPT

## 2025-04-30 PROCEDURE — 71046 X-RAY EXAM CHEST 2 VIEWS: CPT | Mod: 26

## 2025-04-30 PROCEDURE — C1898: CPT

## 2025-04-30 PROCEDURE — 71046 X-RAY EXAM CHEST 2 VIEWS: CPT

## 2025-04-30 RX ORDER — ACETAMINOPHEN 500 MG/5ML
2 LIQUID (ML) ORAL
Qty: 0 | Refills: 0 | DISCHARGE
Start: 2025-04-30

## 2025-04-30 RX ORDER — ACETAMINOPHEN 500 MG/5ML
650 LIQUID (ML) ORAL ONCE
Refills: 0 | Status: COMPLETED | OUTPATIENT
Start: 2025-04-30 | End: 2025-04-30

## 2025-04-30 RX ADMIN — Medication 650 MILLIGRAM(S): at 06:22

## 2025-04-30 RX ADMIN — Medication 650 MILLIGRAM(S): at 00:32

## 2025-04-30 RX ADMIN — Medication 650 MILLIGRAM(S): at 05:22

## 2025-04-30 RX ADMIN — Medication 160 MILLIGRAM(S): at 05:22

## 2025-04-30 RX ADMIN — Medication 650 MILLIGRAM(S): at 11:13

## 2025-04-30 RX ADMIN — METOPROLOL SUCCINATE 25 MILLIGRAM(S): 50 TABLET, EXTENDED RELEASE ORAL at 05:22

## 2025-04-30 NOTE — DISCHARGE NOTE PROVIDER - CARE PROVIDER_API CALL
Jadiel De Jesus  Cardiac Electrophysiology  [address] [address]  Phone: [phone]  Fax: [phone]  Follow Up Time:    Jadiel De Jesus  Cardiac Electrophysiology  [address] [address]  Phone: [phone]  Fax: [phone]  Follow Up Time:     Marcel York  Physician Assistant Services  [address] [address]  Phone: [phone]  Fax: [phone]  Follow Up Time:

## 2025-04-30 NOTE — DISCHARGE NOTE PROVIDER - NSDCFUADDAPPT_GEN_ALL_CORE_FT
Follow up Akanksha WALLACE 5/15/25 @ 10:45 am,  call [phone] if you have questions.   Avoid heavy lifting, exercise and strenuous activity for 4 weeks  Do  not raise  your left arm over the shoulder for 4 weeks  Showering is OK.  Restart Eliquis 5/1/25.

## 2025-04-30 NOTE — DISCHARGE NOTE PROVIDER - PROVIDER TOKENS
PROVIDER:[TOKEN:[[id number]]] PROVIDER:[TOKEN:[[id number]]],PROVIDER:[TOKEN:[[id number]]]

## 2025-04-30 NOTE — DISCHARGE NOTE NURSING/CASE MANAGEMENT/SOCIAL WORK - PATIENT PORTAL LINK FT
You can access the FollowMyHealth Patient Portal offered by [facility] by registering at the following website: [email or web address]. By joining [facility]’s FollowMyHealth portal, you will also be able to view your health information using other applications (apps) compatible with our system.

## 2025-04-30 NOTE — DISCHARGE NOTE PROVIDER - CARE PROVIDERS DIRECT ADDRESSES
[email or web address]

## 2025-04-30 NOTE — DISCHARGE NOTE PROVIDER - HOSPITAL COURSE
74 year old female with HTN, HLD, diabetes and paroxysmal atrial fibrillation on Eliquis last dose 4/28/ 25 , who has a known RBBB/LAFB and near syncope and presents for scheduled pacemaker implant. 74 year old female with HTN, HLD, diabetes and paroxysmal atrial fibrillation on Eliquis last dose 4/28/ 25 , who has a known RBBB/LAFB and near syncope and presents for scheduled pacemaker implant.   Patient had sucsesul implantation of dual chamber pacemaker 4/29/25, there were no complications.   Patient was monitored on telemetry overnight .   Post implant PPM check - normal function, implant site  no bleeding, no selling, no hematoma.  CXR - done < from: Xray Chest 2 Views PA/Lat (04.30.25 @ 08:36) >    Left-sided implanted cardiac device. Mild hypoinflation. No infiltrate   pleural effusion or pneumothorax.  Patient stable for discharge .

## 2025-04-30 NOTE — PROGRESS NOTE ADULT - SUBJECTIVE AND OBJECTIVE BOX
Brief note, full note to follow  Uncomplicated Medtronic MRI compatible dual chamber pacemaker implant.  Access via two left axillary punctures under fluoroscopic guidance using the first rib technique  RV lead screwed into the RV mid-septum for conduction system pacing with an LVAT of 45 msec.  RA lead screwed into the high right atrium  Excellent sensing and pacing in both leads.  Programmed DDD  bpm  Tolerated well, no complications.  Chest x-ray and ECG in am
EPS Device interrogation    Indication: post implant    Device model: PATRICIA Rae 			Implanting Physician: Dr. De Jesus     Functioning Mode: DDD  			    Underlying Rhythm: AS/VS    Pacemaker dependency:  No    Battery status: 100% (MARGAUX)  Interrogating parameters:   				RA			RV			LV    Sense:                                         6.1  mV                        10.1 mV    Threshold:                               0.5 V @ 0.4 ms              0.5  V@ 0.4 ms    Pacing Impedance:                       494 om                             494 om    Shock Impedance:                                                               n/a     Events/Alert:  none    Parameter change: 	none    Normal function PPM  implant site no bleeding, no selling , no hematoma   cxr - leads in good position, no pneumothorax    [ ]EPS attending: Interrogation reviewed. Agree with above.

## 2025-04-30 NOTE — DISCHARGE NOTE PROVIDER - NSDCFUADDINST_GEN_ALL_CORE_FT
The glue will take weeks to fully come off – DO Not pick, rub or remove this glue (pat dry with shower). All the stitches used during your procedure will dissolve on their own. If skin irritation is noted from around the bandage site (e.g., redness, blistering, skin breakdown, etc.), contact the Electrophysiology Office at [phone]    o Do not use any ointments or creams (Including prescriptions) on the incision area.    - Avoid heavy lifting with the procedural side arm of > 10 pounds for 4 weeks. Also, avoid sudden jerking movements on the procedural side arm as well as avoiding upper body exercises for 4 weeks, as these movements can interfere with wound healing and may cause lead dislodgement.  - Contact the Electrophysiology Office ([phone]) or notify your physician if you notice ANY of the following:    o Bleeding or discharge from incision  o increased discomfort  o skin irritation  o signs of infection including fever > 101.0 F.  -For troubleshooting home monitoring issues please call the company who makes your device

## 2025-04-30 NOTE — DISCHARGE NOTE PROVIDER - NSDCCPCAREPLAN_GEN_ALL_CORE_FT
PRINCIPAL DISCHARGE DIAGNOSIS  Diagnosis: Near syncope  Assessment and Plan of Treatment: with RBBB/LAFB

## 2025-04-30 NOTE — DISCHARGE NOTE NURSING/CASE MANAGEMENT/SOCIAL WORK - FINANCIAL ASSISTANCE
[facility] provides services at a reduced cost to those who are determined to be eligible through [facility]’s financial assistance program. Information regarding [facility]’s financial assistance program can be found by going to [email or web address] or by calling [phone].

## 2025-04-30 NOTE — DISCHARGE NOTE NURSING/CASE MANAGEMENT/SOCIAL WORK - NSDCFUADDAPPT_GEN_ALL_CORE_FT
Follow up Akanksha WALLACE 5/15/25 @ 10:45 am,  call [phone] if you have questions.   Avoid heavy lifting, exercise and strenuous activity for 4 weeks  Do  not raise  your left arm over the shoulder for 4 weeks  Showering is OK.  Restart Eliquis 5/1/25.

## 2025-04-30 NOTE — DISCHARGE NOTE PROVIDER - NSDCFUSCHEDAPPT_GEN_ALL_CORE_FT
Jose Ramon Ramires  [facility] Physician [facility]  NEPHRO [address]  Scheduled Appointment: 07/09/2025    Oliva Guy  [facility] Physician [facility]  HEARTVASC [address]  Scheduled Appointment: 07/16/2025

## 2025-04-30 NOTE — DISCHARGE NOTE NURSING/CASE MANAGEMENT/SOCIAL WORK - NSDCPEFALRISK_GEN_ALL_CORE
For information on Fall & Injury Prevention, visit: [email or web address] OR  [email or web address] OR  https://www.cdc.gov/steadi/patient.html

## 2025-04-30 NOTE — DISCHARGE NOTE PROVIDER - NSDCMRMEDTOKEN_GEN_ALL_CORE_FT
Eliquis 5 mg oral tablet: 1 tab(s) orally 2 times a day  ergocalciferol 1.25 mg (50,000 intl units) oral capsule: 1 cap(s) orally once a week  metFORMIN 500 mg oral tablet: orally 2 times a day  metoprolol succinate 25 mg oral tablet, extended release: 1 tab(s) orally 2 times a day  Pharbetol 325 mg oral tablet: 2 tab(s) orally every 6 hours As needed Moderate Pain (4 - 6)  rosuvastatin 10 mg oral tablet: 1 tab(s) orally once a day  valsartan 160 mg oral tablet: 1 tab(s) orally   Eliquis 5 mg oral tablet: 1 tab(s) orally 2 times a day  ergocalciferol 1.25 mg (50,000 intl units) oral capsule: 1 cap(s) orally once a week  metFORMIN 500 mg oral tablet: orally 2 times a day  metoprolol succinate 25 mg oral tablet, extended release: 1 tab(s) orally 2 times a day  Pharbetol 325 mg oral tablet: 2 tab(s) orally every 6 hours As needed Moderate Pain (4 - 6)  rosuvastatin 10 mg oral tablet: 1 tab(s) orally once a day  valsartan 160 mg oral tablet: 1 tab(s) orally once a day

## 2025-05-01 ENCOUNTER — TRANSCRIPTION ENCOUNTER (OUTPATIENT)
Age: 75
End: 2025-05-01

## 2025-05-05 DIAGNOSIS — E78.5 HYPERLIPIDEMIA, UNSPECIFIED: ICD-10-CM

## 2025-05-05 DIAGNOSIS — R55 SYNCOPE AND COLLAPSE: ICD-10-CM

## 2025-05-05 DIAGNOSIS — Z79.01 LONG TERM (CURRENT) USE OF ANTICOAGULANTS: ICD-10-CM

## 2025-05-05 DIAGNOSIS — I45.2 BIFASCICULAR BLOCK: ICD-10-CM

## 2025-05-05 DIAGNOSIS — I10 ESSENTIAL (PRIMARY) HYPERTENSION: ICD-10-CM

## 2025-05-05 DIAGNOSIS — Z86.16 PERSONAL HISTORY OF COVID-19: ICD-10-CM

## 2025-05-05 DIAGNOSIS — E11.9 TYPE 2 DIABETES MELLITUS WITHOUT COMPLICATIONS: ICD-10-CM

## 2025-05-05 DIAGNOSIS — I48.0 PAROXYSMAL ATRIAL FIBRILLATION: ICD-10-CM

## 2025-05-05 DIAGNOSIS — Z79.84 LONG TERM (CURRENT) USE OF ORAL HYPOGLYCEMIC DRUGS: ICD-10-CM

## 2025-05-08 ENCOUNTER — NON-APPOINTMENT (OUTPATIENT)
Age: 75
End: 2025-05-08

## 2025-05-08 ENCOUNTER — APPOINTMENT (OUTPATIENT)
Dept: HEART AND VASCULAR | Facility: CLINIC | Age: 75
End: 2025-05-08
Payer: MEDICARE

## 2025-05-08 VITALS
DIASTOLIC BLOOD PRESSURE: 82 MMHG | WEIGHT: 181 LBS | HEIGHT: 62 IN | BODY MASS INDEX: 33.31 KG/M2 | TEMPERATURE: 98.1 F | HEART RATE: 64 BPM | OXYGEN SATURATION: 97 % | SYSTOLIC BLOOD PRESSURE: 143 MMHG

## 2025-05-08 DIAGNOSIS — I48.91 UNSPECIFIED ATRIAL FIBRILLATION: ICD-10-CM

## 2025-05-08 PROCEDURE — 93280 PM DEVICE PROGR EVAL DUAL: CPT

## 2025-05-29 ENCOUNTER — TRANSCRIPTION ENCOUNTER (OUTPATIENT)
Age: 75
End: 2025-05-29

## 2025-05-30 PROCEDURE — 93228 REMOTE 30 DAY ECG REV/REPORT: CPT

## 2025-06-02 ENCOUNTER — RX RENEWAL (OUTPATIENT)
Age: 75
End: 2025-06-02

## 2025-06-04 ENCOUNTER — RX RENEWAL (OUTPATIENT)
Age: 75
End: 2025-06-04

## 2025-06-11 ENCOUNTER — TRANSCRIPTION ENCOUNTER (OUTPATIENT)
Age: 75
End: 2025-06-11

## 2025-06-12 ENCOUNTER — TRANSCRIPTION ENCOUNTER (OUTPATIENT)
Age: 75
End: 2025-06-12

## 2025-06-27 ENCOUNTER — RX RENEWAL (OUTPATIENT)
Age: 75
End: 2025-06-27

## 2025-07-16 ENCOUNTER — LABORATORY RESULT (OUTPATIENT)
Age: 75
End: 2025-07-16

## 2025-07-16 ENCOUNTER — APPOINTMENT (OUTPATIENT)
Dept: HEART AND VASCULAR | Facility: CLINIC | Age: 75
End: 2025-07-16
Payer: MEDICARE

## 2025-07-16 ENCOUNTER — APPOINTMENT (OUTPATIENT)
Dept: NEPHROLOGY | Facility: CLINIC | Age: 75
End: 2025-07-16
Payer: MEDICARE

## 2025-07-16 VITALS — DIASTOLIC BLOOD PRESSURE: 78 MMHG | HEART RATE: 72 BPM | SYSTOLIC BLOOD PRESSURE: 112 MMHG

## 2025-07-16 VITALS — SYSTOLIC BLOOD PRESSURE: 120 MMHG | DIASTOLIC BLOOD PRESSURE: 80 MMHG | HEART RATE: 72 BPM

## 2025-07-16 VITALS
TEMPERATURE: 98 F | SYSTOLIC BLOOD PRESSURE: 142 MMHG | OXYGEN SATURATION: 99 % | BODY MASS INDEX: 32.76 KG/M2 | HEIGHT: 62 IN | DIASTOLIC BLOOD PRESSURE: 80 MMHG | WEIGHT: 178 LBS | HEART RATE: 76 BPM

## 2025-07-16 VITALS — HEART RATE: 72 BPM | DIASTOLIC BLOOD PRESSURE: 76 MMHG | SYSTOLIC BLOOD PRESSURE: 102 MMHG

## 2025-07-16 VITALS — BODY MASS INDEX: 32.19 KG/M2 | WEIGHT: 176 LBS

## 2025-07-16 PROBLEM — R42 DIZZINESS: Status: ACTIVE | Noted: 2025-07-16

## 2025-07-16 PROCEDURE — 93000 ELECTROCARDIOGRAM COMPLETE: CPT

## 2025-07-16 PROCEDURE — 36415 COLL VENOUS BLD VENIPUNCTURE: CPT

## 2025-07-16 PROCEDURE — G2211 COMPLEX E/M VISIT ADD ON: CPT

## 2025-07-16 PROCEDURE — 99214 OFFICE O/P EST MOD 30 MIN: CPT

## 2025-07-17 LAB
24R-OH-CALCIDIOL SERPL-MCNC: 48.6 PG/ML
25(OH)D3 SERPL-MCNC: 58.7 NG/ML
ALBUMIN SERPL ELPH-MCNC: 4.5 G/DL
ALP BLD-CCNC: 82 U/L
ALT SERPL-CCNC: 14 U/L
ANION GAP SERPL CALC-SCNC: 17 MMOL/L
AST SERPL-CCNC: 23 U/L
BILIRUB SERPL-MCNC: 0.6 MG/DL
BUN SERPL-MCNC: 12 MG/DL
CALCIUM SERPL-MCNC: 10.3 MG/DL
CALCIUM SERPL-MCNC: 10.3 MG/DL
CHLORIDE SERPL-SCNC: 103 MMOL/L
CHOLEST SERPL-MCNC: 135 MG/DL
CO2 SERPL-SCNC: 20 MMOL/L
CREAT SERPL-MCNC: 0.77 MG/DL
CREAT SPEC-SCNC: 40 MG/DL
CREAT/PROT UR: 0.1 RATIO
CYSTATIN C SERPL-MCNC: 1.03 MG/L
EGFRCR SERPLBLD CKD-EPI 2021: 81 ML/MIN/1.73M2
ESTIMATED AVERAGE GLUCOSE: 111 MG/DL
GFR/BSA.PRED SERPLBLD CYS-BASED-ARV: 66 ML/MIN/1.73M2
GLUCOSE SERPL-MCNC: 90 MG/DL
HBA1C MFR BLD HPLC: 5.5 %
HCT VFR BLD CALC: 43.1 %
HDLC SERPL-MCNC: 45 MG/DL
HGB BLD-MCNC: 13.6 G/DL
LDLC SERPL-MCNC: 68 MG/DL
MAGNESIUM SERPL-MCNC: 2 MG/DL
MCHC RBC-ENTMCNC: 29.5 PG
MCHC RBC-ENTMCNC: 31.6 G/DL
MCV RBC AUTO: 93.5 FL
NONHDLC SERPL-MCNC: 90 MG/DL
PARATHYROID HORMONE INTACT: 10 PG/ML
PHOSPHATE SERPL-MCNC: 3.7 MG/DL
PLATELET # BLD AUTO: 221 K/UL
POTASSIUM SERPL-SCNC: 4.8 MMOL/L
PROT SERPL-MCNC: 7 G/DL
PROT UR-MCNC: 4 MG/DL
RBC # BLD: 4.61 M/UL
RBC # FLD: 13.2 %
SODIUM SERPL-SCNC: 139 MMOL/L
T3FREE SERPL-MCNC: 2.86 PG/ML
T3RU NFR SERPL: 1 TBI
T4 FREE SERPL-MCNC: 1.3 NG/DL
T4 SERPL-MCNC: 8.7 UG/DL
TRIGL SERPL-MCNC: 122 MG/DL
TSH SERPL-ACNC: 1.08 UIU/ML
URATE SERPL-MCNC: 4.3 MG/DL
WBC # FLD AUTO: 7.39 K/UL

## 2025-07-18 LAB
THYROGLOB AB SERPL-ACNC: 20.7 IU/ML
THYROPEROXIDASE AB SERPL IA-ACNC: 12 IU/ML

## 2025-07-19 LAB
APPEARANCE: CLEAR
BACTERIA: ABNORMAL /HPF
BILIRUBIN URINE: NEGATIVE
BLOOD URINE: NEGATIVE
CAST: 1 /LPF
COLOR: YELLOW
EPITHELIAL CELLS: 3 /HPF
GLUCOSE QUALITATIVE U: NEGATIVE MG/DL
KETONES URINE: NEGATIVE MG/DL
LEUKOCYTE ESTERASE URINE: ABNORMAL
MICROSCOPIC-UA: NORMAL
NITRITE URINE: NEGATIVE
PH URINE: 6
PROTEIN URINE: NEGATIVE MG/DL
RED BLOOD CELLS URINE: 6 /HPF
REVIEW: NORMAL
SPECIFIC GRAVITY URINE: 1.01
UROBILINOGEN URINE: 0.2 MG/DL
WHITE BLOOD CELLS URINE: 1 /HPF

## 2025-07-21 LAB — ALP BONE SERPL-MCNC: 12.3 UG/L

## 2025-07-23 LAB — COLLAGEN CTX SERPL-MCNC: 161 PG/ML

## 2025-07-28 ENCOUNTER — APPOINTMENT (OUTPATIENT)
Dept: OTOLARYNGOLOGY | Facility: CLINIC | Age: 75
End: 2025-07-28

## 2025-08-06 ENCOUNTER — APPOINTMENT (OUTPATIENT)
Dept: NEPHROLOGY | Facility: CLINIC | Age: 75
End: 2025-08-06

## 2025-08-06 ENCOUNTER — NON-APPOINTMENT (OUTPATIENT)
Age: 75
End: 2025-08-06

## 2025-08-06 ENCOUNTER — APPOINTMENT (OUTPATIENT)
Dept: HEART AND VASCULAR | Facility: CLINIC | Age: 75
End: 2025-08-06
Payer: MEDICARE

## 2025-08-06 VITALS
WEIGHT: 176 LBS | HEART RATE: 75 BPM | TEMPERATURE: 97.9 F | OXYGEN SATURATION: 96 % | HEIGHT: 62 IN | DIASTOLIC BLOOD PRESSURE: 72 MMHG | BODY MASS INDEX: 32.39 KG/M2 | SYSTOLIC BLOOD PRESSURE: 146 MMHG

## 2025-08-06 DIAGNOSIS — I48.91 UNSPECIFIED ATRIAL FIBRILLATION: ICD-10-CM

## 2025-08-06 PROCEDURE — 93280 PM DEVICE PROGR EVAL DUAL: CPT

## 2025-08-15 ENCOUNTER — TRANSCRIPTION ENCOUNTER (OUTPATIENT)
Age: 75
End: 2025-08-15

## (undated) DEVICE — NDL 18G BLUNT FILL PINK

## (undated) DEVICE — WOUND IRR IRRISEPT W 0.5 CHG

## (undated) DEVICE — SUCTION YANKAUER NO CONTROL VENT

## (undated) DEVICE — DRSG COBAN 6"

## (undated) DEVICE — DRSG STOCKINETTE IMPERVIOUS XL

## (undated) DEVICE — DRAPE 1/2 SHEET 40X57"

## (undated) DEVICE — DRAPE LIGHT HANDLE COVER (BLUE)

## (undated) DEVICE — SUT STRATAFIX SPIRAL PDO 1 30CM OS-6

## (undated) DEVICE — SAW BLADE STRYKER SAGITTAL 25X86.5X1.32MM

## (undated) DEVICE — SYR ASEPTO

## (undated) DEVICE — MARKING PEN W RULER

## (undated) DEVICE — ROSA NAVITRACKER KIT KNEE/SPINE

## (undated) DEVICE — SAW BLADE STRYKER SAGITTAL DUAL CUT TEETH 35X64X.64MM

## (undated) DEVICE — SAW BLADE STRYKER SAGITTAL 81.5X12.5X1.19MM

## (undated) DEVICE — ROSA DRAPE ROBOTIC UNIT

## (undated) DEVICE — DRAPE TOWEL BLUE 17" X 24"

## (undated) DEVICE — POLISHER OR CAUTERY TIP

## (undated) DEVICE — NDL HYPO SAFE 22G X 1.5" (BLACK)

## (undated) DEVICE — ELCTR AQUAMANTYS BIPOLAR SEALER 6.0

## (undated) DEVICE — HOOD T5 PEELAWAY

## (undated) DEVICE — SUT STRATAFIX SPIRAL MONOCRYL PLUS 3-0 30CM PS-1 UNDYED

## (undated) DEVICE — DRAPE U POLY BLUE 60X72"

## (undated) DEVICE — PACK TOTAL KNEE

## (undated) DEVICE — GLV 7.5 PROTEXIS (WHITE)

## (undated) DEVICE — ELCTR BOVIE PENCIL HANDPIECE ROCKER SWITCH 15FT

## (undated) DEVICE — POSITIONER FOAM EGG CRATE ULNAR 2PCS (PINK)

## (undated) DEVICE — WARMING BLANKET UPPER ADULT

## (undated) DEVICE — SUT STRATAFIX SPIRAL PDO 0 24CM CP-2

## (undated) DEVICE — GLV 7 PROTEXIS (WHITE)

## (undated) DEVICE — SUT STRATAFIX SYMMETRIC PDS 1 45CM OS-6

## (undated) DEVICE — STRYKER 4-PORT MANIFOLD W/SPECIMEN COLLECTION

## (undated) DEVICE — PREP CHLORAPREP HI-LITE ORANGE 26ML

## (undated) DEVICE — VENODYNE/SCD SLEEVE CALF MEDIUM

## (undated) DEVICE — SYR LUER LOK 50CC

## (undated) DEVICE — SUT VICRYL 2-0 27" CT-1 UNDYED

## (undated) DEVICE — SUT VICRYL 0 36" CT-1 UNDYED